# Patient Record
Sex: FEMALE | Race: WHITE | NOT HISPANIC OR LATINO | Employment: OTHER | URBAN - METROPOLITAN AREA
[De-identification: names, ages, dates, MRNs, and addresses within clinical notes are randomized per-mention and may not be internally consistent; named-entity substitution may affect disease eponyms.]

---

## 2017-06-08 ENCOUNTER — ANESTHESIA (OUTPATIENT)
Dept: SURGERY | Facility: CLINIC | Age: 47
End: 2017-06-08
Payer: COMMERCIAL

## 2017-06-08 ENCOUNTER — ANESTHESIA EVENT (OUTPATIENT)
Dept: SURGERY | Facility: CLINIC | Age: 47
End: 2017-06-08
Payer: COMMERCIAL

## 2017-06-08 ENCOUNTER — SURGERY (OUTPATIENT)
Age: 47
End: 2017-06-08

## 2017-06-08 ENCOUNTER — APPOINTMENT (OUTPATIENT)
Dept: ULTRASOUND IMAGING | Facility: CLINIC | Age: 47
End: 2017-06-08
Attending: FAMILY MEDICINE
Payer: COMMERCIAL

## 2017-06-08 ENCOUNTER — HOSPITAL ENCOUNTER (OUTPATIENT)
Facility: CLINIC | Age: 47
Discharge: HOME | End: 2017-06-08
Attending: FAMILY MEDICINE | Admitting: SPECIALIST
Payer: COMMERCIAL

## 2017-06-08 VITALS
HEART RATE: 63 BPM | DIASTOLIC BLOOD PRESSURE: 95 MMHG | SYSTOLIC BLOOD PRESSURE: 144 MMHG | OXYGEN SATURATION: 100 % | RESPIRATION RATE: 14 BRPM | TEMPERATURE: 98.1 F

## 2017-06-08 DIAGNOSIS — Z98.890 POSTOPERATIVE NAUSEA: ICD-10-CM

## 2017-06-08 DIAGNOSIS — R11.0 POSTOPERATIVE NAUSEA: ICD-10-CM

## 2017-06-08 DIAGNOSIS — G89.18 POST-OP PAIN: Primary | ICD-10-CM

## 2017-06-08 DIAGNOSIS — K81.0 ACUTE CHOLECYSTITIS: ICD-10-CM

## 2017-06-08 LAB
ALBUMIN SERPL-MCNC: 3.6 G/DL (ref 3.4–5)
ALP SERPL-CCNC: 104 U/L (ref 40–150)
ALT SERPL W P-5'-P-CCNC: 31 U/L (ref 0–50)
ANION GAP SERPL CALCULATED.3IONS-SCNC: 9 MMOL/L (ref 3–14)
AST SERPL W P-5'-P-CCNC: 23 U/L (ref 0–45)
BASOPHILS # BLD AUTO: 0 10E9/L (ref 0–0.2)
BASOPHILS NFR BLD AUTO: 0.3 %
BILIRUB SERPL-MCNC: 0.3 MG/DL (ref 0.2–1.3)
BUN SERPL-MCNC: 8 MG/DL (ref 7–30)
CALCIUM SERPL-MCNC: 9.8 MG/DL (ref 8.5–10.1)
CHLORIDE SERPL-SCNC: 107 MMOL/L (ref 94–109)
CO2 SERPL-SCNC: 24 MMOL/L (ref 20–32)
CREAT SERPL-MCNC: 0.69 MG/DL (ref 0.52–1.04)
DIFFERENTIAL METHOD BLD: ABNORMAL
EOSINOPHIL # BLD AUTO: 0.5 10E9/L (ref 0–0.7)
EOSINOPHIL NFR BLD AUTO: 4 %
ERYTHROCYTE [DISTWIDTH] IN BLOOD BY AUTOMATED COUNT: 13.6 % (ref 10–15)
GFR SERPL CREATININE-BSD FRML MDRD: ABNORMAL ML/MIN/1.7M2
GLUCOSE SERPL-MCNC: 123 MG/DL (ref 70–99)
HCG UR QL: NEGATIVE
HCT VFR BLD AUTO: 39.7 % (ref 35–47)
HGB BLD-MCNC: 12.9 G/DL (ref 11.7–15.7)
IMM GRANULOCYTES # BLD: 0 10E9/L (ref 0–0.4)
IMM GRANULOCYTES NFR BLD: 0.2 %
LIPASE SERPL-CCNC: 214 U/L (ref 73–393)
LYMPHOCYTES # BLD AUTO: 3.4 10E9/L (ref 0.8–5.3)
LYMPHOCYTES NFR BLD AUTO: 26.4 %
MCH RBC QN AUTO: 28.7 PG (ref 26.5–33)
MCHC RBC AUTO-ENTMCNC: 32.5 G/DL (ref 31.5–36.5)
MCV RBC AUTO: 88 FL (ref 78–100)
MONOCYTES # BLD AUTO: 1.2 10E9/L (ref 0–1.3)
MONOCYTES NFR BLD AUTO: 9.2 %
NEUTROPHILS # BLD AUTO: 7.7 10E9/L (ref 1.6–8.3)
NEUTROPHILS NFR BLD AUTO: 59.9 %
PLATELET # BLD AUTO: 382 10E9/L (ref 150–450)
POTASSIUM SERPL-SCNC: 4.1 MMOL/L (ref 3.4–5.3)
PROT SERPL-MCNC: 7.7 G/DL (ref 6.8–8.8)
RBC # BLD AUTO: 4.5 10E12/L (ref 3.8–5.2)
SODIUM SERPL-SCNC: 140 MMOL/L (ref 133–144)
WBC # BLD AUTO: 12.9 10E9/L (ref 4–11)

## 2017-06-08 PROCEDURE — 47562 LAPAROSCOPIC CHOLECYSTECTOMY: CPT | Mod: 22 | Performed by: SPECIALIST

## 2017-06-08 PROCEDURE — 37000008 ZZH ANESTHESIA TECHNICAL FEE, 1ST 30 MIN: Performed by: SPECIALIST

## 2017-06-08 PROCEDURE — 25000128 H RX IP 250 OP 636: Performed by: FAMILY MEDICINE

## 2017-06-08 PROCEDURE — 96361 HYDRATE IV INFUSION ADD-ON: CPT | Performed by: FAMILY MEDICINE

## 2017-06-08 PROCEDURE — 25000125 ZZHC RX 250: Performed by: NURSE ANESTHETIST, CERTIFIED REGISTERED

## 2017-06-08 PROCEDURE — 71000027 ZZH RECOVERY PHASE 2 EACH 15 MINS: Performed by: SPECIALIST

## 2017-06-08 PROCEDURE — 25000564 ZZH DESFLURANE, EA 15 MIN: Performed by: SPECIALIST

## 2017-06-08 PROCEDURE — 99285 EMERGENCY DEPT VISIT HI MDM: CPT | Mod: 25 | Performed by: FAMILY MEDICINE

## 2017-06-08 PROCEDURE — 76705 ECHO EXAM OF ABDOMEN: CPT

## 2017-06-08 PROCEDURE — 36000058 ZZH SURGERY LEVEL 3 EA 15 ADDTL MIN: Performed by: SPECIALIST

## 2017-06-08 PROCEDURE — 96376 TX/PRO/DX INJ SAME DRUG ADON: CPT | Performed by: FAMILY MEDICINE

## 2017-06-08 PROCEDURE — 88304 TISSUE EXAM BY PATHOLOGIST: CPT | Performed by: SPECIALIST

## 2017-06-08 PROCEDURE — 25000128 H RX IP 250 OP 636: Performed by: NURSE ANESTHETIST, CERTIFIED REGISTERED

## 2017-06-08 PROCEDURE — 25000125 ZZHC RX 250: Performed by: FAMILY MEDICINE

## 2017-06-08 PROCEDURE — 80053 COMPREHEN METABOLIC PANEL: CPT | Performed by: FAMILY MEDICINE

## 2017-06-08 PROCEDURE — 81025 URINE PREGNANCY TEST: CPT | Performed by: NURSE ANESTHETIST, CERTIFIED REGISTERED

## 2017-06-08 PROCEDURE — 83690 ASSAY OF LIPASE: CPT | Performed by: FAMILY MEDICINE

## 2017-06-08 PROCEDURE — 25000125 ZZHC RX 250: Performed by: SPECIALIST

## 2017-06-08 PROCEDURE — 96374 THER/PROPH/DIAG INJ IV PUSH: CPT | Performed by: FAMILY MEDICINE

## 2017-06-08 PROCEDURE — 99222 1ST HOSP IP/OBS MODERATE 55: CPT | Mod: 57 | Performed by: SPECIALIST

## 2017-06-08 PROCEDURE — 85025 COMPLETE CBC W/AUTO DIFF WBC: CPT | Performed by: FAMILY MEDICINE

## 2017-06-08 PROCEDURE — 25000132 ZZH RX MED GY IP 250 OP 250 PS 637: Performed by: SPECIALIST

## 2017-06-08 PROCEDURE — 40000306 ZZH STATISTIC PRE PROC ASSESS II: Performed by: SPECIALIST

## 2017-06-08 PROCEDURE — 37000009 ZZH ANESTHESIA TECHNICAL FEE, EACH ADDTL 15 MIN: Performed by: SPECIALIST

## 2017-06-08 PROCEDURE — 96375 TX/PRO/DX INJ NEW DRUG ADDON: CPT | Performed by: FAMILY MEDICINE

## 2017-06-08 PROCEDURE — 27210794 ZZH OR GENERAL SUPPLY STERILE: Performed by: SPECIALIST

## 2017-06-08 PROCEDURE — 71000014 ZZH RECOVERY PHASE 1 LEVEL 2 FIRST HR: Performed by: SPECIALIST

## 2017-06-08 PROCEDURE — 36000056 ZZH SURGERY LEVEL 3 1ST 30 MIN: Performed by: SPECIALIST

## 2017-06-08 PROCEDURE — 88304 TISSUE EXAM BY PATHOLOGIST: CPT | Mod: 26 | Performed by: SPECIALIST

## 2017-06-08 PROCEDURE — 25000132 ZZH RX MED GY IP 250 OP 250 PS 637: Performed by: FAMILY MEDICINE

## 2017-06-08 PROCEDURE — 71000015 ZZH RECOVERY PHASE 1 LEVEL 2 EA ADDTL HR: Performed by: SPECIALIST

## 2017-06-08 PROCEDURE — 27110028 ZZH OR GENERAL SUPPLY NON-STERILE: Performed by: SPECIALIST

## 2017-06-08 RX ORDER — NEOSTIGMINE METHYLSULFATE 1 MG/ML
VIAL (ML) INJECTION PRN
Status: DISCONTINUED | OUTPATIENT
Start: 2017-06-08 | End: 2017-06-08

## 2017-06-08 RX ORDER — OXYCODONE AND ACETAMINOPHEN 5; 325 MG/1; MG/1
1-2 TABLET ORAL EVERY 4 HOURS PRN
Qty: 30 TABLET | Refills: 0 | Status: SHIPPED | OUTPATIENT
Start: 2017-06-08 | End: 2020-10-05

## 2017-06-08 RX ORDER — SODIUM CHLORIDE 9 MG/ML
1000 INJECTION, SOLUTION INTRAVENOUS CONTINUOUS
Status: DISCONTINUED | OUTPATIENT
Start: 2017-06-08 | End: 2017-06-08 | Stop reason: HOSPADM

## 2017-06-08 RX ORDER — SCOLOPAMINE TRANSDERMAL SYSTEM 1 MG/1
1 PATCH, EXTENDED RELEASE TRANSDERMAL ONCE
Status: COMPLETED | OUTPATIENT
Start: 2017-06-08 | End: 2017-06-08

## 2017-06-08 RX ORDER — LORAZEPAM 2 MG/ML
1 INJECTION INTRAMUSCULAR ONCE
Status: COMPLETED | OUTPATIENT
Start: 2017-06-08 | End: 2017-06-08

## 2017-06-08 RX ORDER — ONDANSETRON 4 MG/1
4 TABLET, ORALLY DISINTEGRATING ORAL EVERY 30 MIN PRN
Status: DISCONTINUED | OUTPATIENT
Start: 2017-06-08 | End: 2017-06-08 | Stop reason: HOSPADM

## 2017-06-08 RX ORDER — DIMENHYDRINATE 50 MG/ML
25 INJECTION, SOLUTION INTRAMUSCULAR; INTRAVENOUS
Status: COMPLETED | OUTPATIENT
Start: 2017-06-08 | End: 2017-06-08

## 2017-06-08 RX ORDER — BUPIVACAINE HYDROCHLORIDE AND EPINEPHRINE 2.5; 5 MG/ML; UG/ML
INJECTION, SOLUTION INFILTRATION; PERINEURAL PRN
Status: DISCONTINUED | OUTPATIENT
Start: 2017-06-08 | End: 2017-06-08 | Stop reason: HOSPADM

## 2017-06-08 RX ORDER — ONDANSETRON 2 MG/ML
4 INJECTION INTRAMUSCULAR; INTRAVENOUS EVERY 30 MIN PRN
Status: DISCONTINUED | OUTPATIENT
Start: 2017-06-08 | End: 2017-06-08 | Stop reason: HOSPADM

## 2017-06-08 RX ORDER — SODIUM CHLORIDE, SODIUM LACTATE, POTASSIUM CHLORIDE, CALCIUM CHLORIDE 600; 310; 30; 20 MG/100ML; MG/100ML; MG/100ML; MG/100ML
INJECTION, SOLUTION INTRAVENOUS CONTINUOUS
Status: DISCONTINUED | OUTPATIENT
Start: 2017-06-08 | End: 2017-06-08 | Stop reason: HOSPADM

## 2017-06-08 RX ORDER — NALOXONE HYDROCHLORIDE 0.4 MG/ML
.1-.4 INJECTION, SOLUTION INTRAMUSCULAR; INTRAVENOUS; SUBCUTANEOUS
Status: DISCONTINUED | OUTPATIENT
Start: 2017-06-08 | End: 2017-06-08 | Stop reason: HOSPADM

## 2017-06-08 RX ORDER — FENTANYL CITRATE 50 UG/ML
50-100 INJECTION, SOLUTION INTRAMUSCULAR; INTRAVENOUS
Status: DISCONTINUED | OUTPATIENT
Start: 2017-06-08 | End: 2017-06-08 | Stop reason: HOSPADM

## 2017-06-08 RX ORDER — OXYCODONE AND ACETAMINOPHEN 5; 325 MG/1; MG/1
1-2 TABLET ORAL
Status: COMPLETED | OUTPATIENT
Start: 2017-06-08 | End: 2017-06-08

## 2017-06-08 RX ORDER — DEXTROSE, SODIUM CHLORIDE, SODIUM LACTATE, POTASSIUM CHLORIDE, AND CALCIUM CHLORIDE 5; .6; .31; .03; .02 G/100ML; G/100ML; G/100ML; G/100ML; G/100ML
INJECTION, SOLUTION INTRAVENOUS CONTINUOUS
Status: DISCONTINUED | OUTPATIENT
Start: 2017-06-08 | End: 2017-06-08 | Stop reason: HOSPADM

## 2017-06-08 RX ORDER — LIDOCAINE HYDROCHLORIDE 20 MG/ML
INJECTION, SOLUTION INFILTRATION; PERINEURAL PRN
Status: DISCONTINUED | OUTPATIENT
Start: 2017-06-08 | End: 2017-06-08

## 2017-06-08 RX ORDER — LIDOCAINE 40 MG/G
CREAM TOPICAL
Status: DISCONTINUED | OUTPATIENT
Start: 2017-06-08 | End: 2017-06-08 | Stop reason: HOSPADM

## 2017-06-08 RX ORDER — PROPOFOL 10 MG/ML
INJECTION, EMULSION INTRAVENOUS PRN
Status: DISCONTINUED | OUTPATIENT
Start: 2017-06-08 | End: 2017-06-08

## 2017-06-08 RX ORDER — FENTANYL CITRATE 50 UG/ML
25-50 INJECTION, SOLUTION INTRAMUSCULAR; INTRAVENOUS
Status: DISCONTINUED | OUTPATIENT
Start: 2017-06-08 | End: 2017-06-08 | Stop reason: HOSPADM

## 2017-06-08 RX ORDER — EPHEDRINE SULFATE 50 MG/ML
INJECTION, SOLUTION INTRAMUSCULAR; INTRAVENOUS; SUBCUTANEOUS PRN
Status: DISCONTINUED | OUTPATIENT
Start: 2017-06-08 | End: 2017-06-08

## 2017-06-08 RX ORDER — GLYCOPYRROLATE 0.2 MG/ML
INJECTION, SOLUTION INTRAMUSCULAR; INTRAVENOUS PRN
Status: DISCONTINUED | OUTPATIENT
Start: 2017-06-08 | End: 2017-06-08

## 2017-06-08 RX ORDER — ONDANSETRON 4 MG/1
4 TABLET, FILM COATED ORAL EVERY 6 HOURS PRN
Qty: 20 TABLET | Refills: 1 | Status: SHIPPED | OUTPATIENT
Start: 2017-06-08 | End: 2020-10-05

## 2017-06-08 RX ORDER — KETOROLAC TROMETHAMINE 30 MG/ML
30 INJECTION, SOLUTION INTRAMUSCULAR; INTRAVENOUS ONCE
Status: DISCONTINUED | OUTPATIENT
Start: 2017-06-08 | End: 2017-06-08 | Stop reason: ALTCHOICE

## 2017-06-08 RX ORDER — KETOROLAC TROMETHAMINE 30 MG/ML
INJECTION, SOLUTION INTRAMUSCULAR; INTRAVENOUS PRN
Status: DISCONTINUED | OUTPATIENT
Start: 2017-06-08 | End: 2017-06-08

## 2017-06-08 RX ORDER — DEXAMETHASONE SODIUM PHOSPHATE 10 MG/ML
INJECTION, SOLUTION INTRAMUSCULAR; INTRAVENOUS PRN
Status: DISCONTINUED | OUTPATIENT
Start: 2017-06-08 | End: 2017-06-08

## 2017-06-08 RX ORDER — CALCIUM CARBONATE 500 MG/1
1 TABLET, CHEWABLE ORAL DAILY
COMMUNITY

## 2017-06-08 RX ORDER — MEPERIDINE HYDROCHLORIDE 50 MG/ML
12.5 INJECTION INTRAMUSCULAR; INTRAVENOUS; SUBCUTANEOUS
Status: DISCONTINUED | OUTPATIENT
Start: 2017-06-08 | End: 2017-06-08 | Stop reason: HOSPADM

## 2017-06-08 RX ORDER — ERTAPENEM 1 G/1
1 INJECTION, POWDER, LYOPHILIZED, FOR SOLUTION INTRAMUSCULAR; INTRAVENOUS ONCE
Status: COMPLETED | OUTPATIENT
Start: 2017-06-08 | End: 2017-06-08

## 2017-06-08 RX ORDER — IBUPROFEN 600 MG/1
600 TABLET, FILM COATED ORAL EVERY 6 HOURS PRN
COMMUNITY

## 2017-06-08 RX ADMIN — FENTANYL CITRATE 50 MCG: 50 INJECTION INTRAMUSCULAR; INTRAVENOUS at 13:06

## 2017-06-08 RX ADMIN — FENTANYL CITRATE 50 MCG: 50 INJECTION INTRAMUSCULAR; INTRAVENOUS at 08:11

## 2017-06-08 RX ADMIN — DEXAMETHASONE SODIUM PHOSPHATE 10 MG: 10 INJECTION, SOLUTION INTRAMUSCULAR; INTRAVENOUS at 13:23

## 2017-06-08 RX ADMIN — SODIUM CHLORIDE, POTASSIUM CHLORIDE, SODIUM LACTATE AND CALCIUM CHLORIDE: 600; 310; 30; 20 INJECTION, SOLUTION INTRAVENOUS at 13:52

## 2017-06-08 RX ADMIN — MIDAZOLAM HYDROCHLORIDE 1 MG: 1 INJECTION, SOLUTION INTRAMUSCULAR; INTRAVENOUS at 13:03

## 2017-06-08 RX ADMIN — DIMENHYDRINATE 25 MG: 50 INJECTION, SOLUTION INTRAMUSCULAR; INTRAVENOUS at 14:49

## 2017-06-08 RX ADMIN — NEOSTIGMINE METHYLSULFATE 3 MG: 1 INJECTION INTRAMUSCULAR; INTRAVENOUS; SUBCUTANEOUS at 14:04

## 2017-06-08 RX ADMIN — ROCURONIUM BROMIDE 40 MG: 10 INJECTION INTRAVENOUS at 13:05

## 2017-06-08 RX ADMIN — SODIUM CHLORIDE 1000 ML: 9 INJECTION, SOLUTION INTRAVENOUS at 05:05

## 2017-06-08 RX ADMIN — KETOROLAC TROMETHAMINE 30 MG: 30 INJECTION, SOLUTION INTRAMUSCULAR at 14:03

## 2017-06-08 RX ADMIN — LIDOCAINE HYDROCHLORIDE 30 ML: 20 SOLUTION ORAL; TOPICAL at 05:05

## 2017-06-08 RX ADMIN — ONDANSETRON 4 MG: 2 INJECTION INTRAMUSCULAR; INTRAVENOUS at 13:22

## 2017-06-08 RX ADMIN — FENTANYL CITRATE 50 MCG: 50 INJECTION INTRAMUSCULAR; INTRAVENOUS at 14:17

## 2017-06-08 RX ADMIN — PROCHLORPERAZINE EDISYLATE 5 MG: 5 INJECTION INTRAMUSCULAR; INTRAVENOUS at 15:08

## 2017-06-08 RX ADMIN — LORAZEPAM 1 MG: 2 INJECTION INTRAMUSCULAR; INTRAVENOUS at 05:11

## 2017-06-08 RX ADMIN — SODIUM CHLORIDE, POTASSIUM CHLORIDE, SODIUM LACTATE AND CALCIUM CHLORIDE: 600; 310; 30; 20 INJECTION, SOLUTION INTRAVENOUS at 13:01

## 2017-06-08 RX ADMIN — MIDAZOLAM HYDROCHLORIDE 1 MG: 1 INJECTION, SOLUTION INTRAMUSCULAR; INTRAVENOUS at 13:04

## 2017-06-08 RX ADMIN — Medication 30 ML: at 14:01

## 2017-06-08 RX ADMIN — ONDANSETRON HYDROCHLORIDE 4 MG: 2 INJECTION, SOLUTION INTRAMUSCULAR; INTRAVENOUS at 14:40

## 2017-06-08 RX ADMIN — LIDOCAINE HYDROCHLORIDE 80 MG: 20 INJECTION, SOLUTION INFILTRATION; PERINEURAL at 13:05

## 2017-06-08 RX ADMIN — FENTANYL CITRATE 100 MCG: 50 INJECTION INTRAMUSCULAR; INTRAVENOUS at 05:07

## 2017-06-08 RX ADMIN — SODIUM CHLORIDE 1000 ML: 9 INJECTION, SOLUTION INTRAVENOUS at 06:08

## 2017-06-08 RX ADMIN — FENTANYL CITRATE 50 MCG: 50 INJECTION INTRAMUSCULAR; INTRAVENOUS at 10:53

## 2017-06-08 RX ADMIN — SODIUM CHLORIDE, SODIUM LACTATE, POTASSIUM CHLORIDE, CALCIUM CHLORIDE AND DEXTROSE MONOHYDRATE: 5; 600; 310; 30; 20 INJECTION, SOLUTION INTRAVENOUS at 15:46

## 2017-06-08 RX ADMIN — SCOPOLAMINE 1 PATCH: 1 PATCH, EXTENDED RELEASE TRANSDERMAL at 15:41

## 2017-06-08 RX ADMIN — FENTANYL CITRATE 50 MCG: 50 INJECTION INTRAMUSCULAR; INTRAVENOUS at 13:05

## 2017-06-08 RX ADMIN — GLYCOPYRROLATE 0.6 MG: 0.2 INJECTION, SOLUTION INTRAMUSCULAR; INTRAVENOUS at 14:04

## 2017-06-08 RX ADMIN — FENTANYL CITRATE 50 MCG: 50 INJECTION INTRAMUSCULAR; INTRAVENOUS at 14:19

## 2017-06-08 RX ADMIN — FENTANYL CITRATE 50 MCG: 50 INJECTION INTRAMUSCULAR; INTRAVENOUS at 13:03

## 2017-06-08 RX ADMIN — Medication 10 MG: at 13:28

## 2017-06-08 RX ADMIN — OXYCODONE HYDROCHLORIDE AND ACETAMINOPHEN 1 TABLET: 5; 325 TABLET ORAL at 16:50

## 2017-06-08 RX ADMIN — PROPOFOL 150 MG: 10 INJECTION, EMULSION INTRAVENOUS at 13:05

## 2017-06-08 RX ADMIN — ERTAPENEM SODIUM 1 G: 1 INJECTION, POWDER, LYOPHILIZED, FOR SOLUTION INTRAMUSCULAR; INTRAVENOUS at 07:48

## 2017-06-08 NOTE — ANESTHESIA CARE TRANSFER NOTE
Patient: Janiya Samuels    Procedure(s):  Laparoscopic Cholecystectomy - Wound Class: II-Clean Contaminated    Diagnosis: cholecystitis  Diagnosis Additional Information: No value filed.    Anesthesia Type:   General, ETT     Note:  Airway :Face Mask  Patient transferred to:PACU  Comments: Patient resting without complaint at this time. Report to pacu rn      Vitals: (Last set prior to Anesthesia Care Transfer)    CRNA VITALS  6/8/2017 1349 - 6/8/2017 1424      6/8/2017             Pulse: 113    SpO2: 100 %    Resp Rate (observed): 20                Electronically Signed By: MALA Moulton CRNA  June 8, 2017  2:24 PM

## 2017-06-08 NOTE — ANESTHESIA POSTPROCEDURE EVALUATION
Patient: Janiya Samuels    Procedure(s):  Laparoscopic Cholecystectomy - Wound Class: II-Clean Contaminated    Diagnosis:cholecystitis  Diagnosis Additional Information: No value filed.    Anesthesia Type:  General, ETT    Note:  Anesthesia Post Evaluation    Patient location during evaluation: Phase 2  Patient participation: Able to fully participate in evaluation  Level of consciousness: awake and alert  Pain management: adequate  Airway patency: patent  Cardiovascular status: acceptable  Respiratory status: acceptable  Hydration status: acceptable  PONV: controlled             Last vitals:  Vitals:    06/08/17 1615 06/08/17 1630 06/08/17 1645   BP: 135/88 127/77 (!) 146/96   Pulse:      Resp: 14     Temp:      SpO2: 97% 100% 100%         Electronically Signed By: MALA Morales CRNA  June 8, 2017  5:06 PM

## 2017-06-08 NOTE — ANESTHESIA PREPROCEDURE EVALUATION
Anesthesia Evaluation     . Pt has had prior anesthetic. Type: Regional           ROS/MED HX    ENT/Pulmonary:       Neurologic:  - neg neurologic ROS     Cardiovascular:     (+) ----. : . . . :. . No previous cardiac testing       METS/Exercise Tolerance:     Hematologic:  - neg hematologic  ROS       Musculoskeletal:  - neg musculoskeletal ROS       GI/Hepatic:     (+) Other GI/Hepatic ABD pain      Renal/Genitourinary:  - ROS Renal section negative   (+) Pt has no history of transplant,       Endo:  - neg endo ROS       Psychiatric:  - neg psychiatric ROS       Infectious Disease:  - neg infectious disease ROS       Malignancy:      - no malignancy   Other:    (+) No chance of pregnancy C-spine cleared: N/A, no H/O Chronic Pain,  - neg other ROS                 Physical Exam  Normal systems: cardiovascular, pulmonary and dental    Airway   Mallampati: II  TM distance: >3 FB  Neck ROM: full    Dental     Cardiovascular   Rhythm and rate: regular and normal      Pulmonary    breath sounds clear to auscultation                    Anesthesia Plan      History & Physical Review  History and physical reviewed and following examination; no interval change.    ASA Status:  1 .    NPO Status:  > 8 hours    Plan for General and ETT with Intravenous and Propofol induction.   PONV prophylaxis:  Ondansetron (or other 5HT-3) and Dexamethasone or Solumedrol       Postoperative Care  Postoperative pain management:  IV analgesics and Oral pain medications.      Consents  Anesthetic plan, risks, benefits and alternatives discussed with:  Patient..                          .

## 2017-06-08 NOTE — ED PROVIDER NOTES
History     Chief Complaint   Patient presents with     Abdominal Pain     HPI  Janiya Samuels is a 46 year old female who presents to the ED this morning with epigastric pain that started gradually on the evening of 2017 after eating a hamburger for supper.  She did not get much rest that night.  Took Tums several times with little relief.  She had pain throughout the day yesterday and tonight the pain worsened so that she has been unable to sleep.      Looking back, she has had intermittent episodes of discomfort for about the past year or so.  She thought it was related to gastritis or an ulcer, as her son had similar symptoms with an ulcer, so she cut out caffeine and tried to change her diet.  She would find some relief with Tums and the episodes decreased in severity and frequency.  They have never lasted this long or been this severe.  She denies any fever.    No nausea vomiting.  No diarrhea or constipation.  No dysuria.  Last menstrual period was 3 weeks ago.  She denies chance of pregnancy as her  had a vasectomy 14 years ago.  She hasn't had much to eat in the past day or so because it seems to worsen if she eats.    Only surgery is a .    History reviewed. No pertinent past medical history.    Past Surgical History:   Procedure Laterality Date      SECTION      5th child       Social History     Social History     Marital status:      Spouse name: N/A     Number of children: N/A     Years of education: N/A     Occupational History     Not on file.     Social History Main Topics     Smoking status: Never Smoker     Smokeless tobacco: Never Used     Alcohol use Not on file     Drug use: Not on file     Sexual activity: Yes     Partners: Male     Birth control/ protection: Surgical      Comment:  vast     Other Topics Concern     Not on file     Social History Narrative          Allergies   Allergen Reactions     Erythromycin        Med List Reviewed       I  have reviewed the Medications, Allergies, Past Medical and Surgical History, and Social History in the Epic system.    Review of Systems   All other systems reviewed and are negative.      Physical Exam   BP: (!) 168/95  Heart Rate: 64  Temp: 97.5  F (36.4  C)  Resp: 18  SpO2: 99 %  Physical Exam   Constitutional: She is oriented to person, place, and time. She appears well-developed and well-nourished. She appears distressed (moderate, tearful at times).   HENT:   Mouth/Throat: Oropharynx is clear and moist.   Eyes: EOM are normal.   Neck: Neck supple.   Cardiovascular: Normal rate, regular rhythm and normal heart sounds.    Pulmonary/Chest: Effort normal and breath sounds normal. No respiratory distress.   Abdominal: Soft. There is tenderness (marked in RUQ and epigastric).   Musculoskeletal: Normal range of motion. She exhibits no edema.   Neurological: She is alert and oriented to person, place, and time.   Skin: Skin is warm and dry. No erythema.   Psychiatric: She has a normal mood and affect.       ED Course    She just took ibuprofen 600 mg an hour ago so we will hold off on Toradol.  IV placed.  Labs drawn .  Suspicious for gallbladder etiology.  Fentanyl for pain.  Ativan for spasm and to help augment the pain control.    White count up at 12.9.  LFTs and lipase are normal.    RUQ ultrasound has been ordered looking for evidence of cholecystitis.  Atypical appendicitis presentation must also be kept in mind.     She is much more comfortable and finally able to get some rest after the fentanyl and Ativan.  She hasn't had much sleep the last 2 nights.  Ultrasound is still pending.    Ultrasound shows the gallbladder wall to be thickened measuring up to 0.4 cm.  There is sludge within the gallbladder and the gallbladder is distended measuring almost 15 cm.  No shadowing gallstones identified.  No pericholecystic fluid.      This is suspicious for cholecystitis and with her elevated white count, I contacted  Dr. Cisneros from general surgery.  He concurs and will plan on taking her gallbladder out later today.  In the meantime, her pain is well controlled with fentanyl and Ativan.  He requested we give her a gram of Invanz while we're waiting.  He will contact the surgery department and get back to us.    Surgery is tentatively scheduled for 1300 today.  No personal or family history of bleeding problems or problems with anesthesia other than she did drop her BP with a labor epidural.      She is cleared for surgery and anesthesia of choice.         ED Course     Procedures          Results for orders placed or performed during the hospital encounter of 06/08/17 (from the past 24 hour(s))   CBC with platelets differential   Result Value Ref Range    WBC 12.9 (H) 4.0 - 11.0 10e9/L    RBC Count 4.50 3.8 - 5.2 10e12/L    Hemoglobin 12.9 11.7 - 15.7 g/dL    Hematocrit 39.7 35.0 - 47.0 %    MCV 88 78 - 100 fl    MCH 28.7 26.5 - 33.0 pg    MCHC 32.5 31.5 - 36.5 g/dL    RDW 13.6 10.0 - 15.0 %    Platelet Count 382 150 - 450 10e9/L    Diff Method Automated Method     % Neutrophils 59.9 %    % Lymphocytes 26.4 %    % Monocytes 9.2 %    % Eosinophils 4.0 %    % Basophils 0.3 %    % Immature Granulocytes 0.2 %    Absolute Neutrophil 7.7 1.6 - 8.3 10e9/L    Absolute Lymphocytes 3.4 0.8 - 5.3 10e9/L    Absolute Monocytes 1.2 0.0 - 1.3 10e9/L    Absolute Eosinophils 0.5 0.0 - 0.7 10e9/L    Absolute Basophils 0.0 0.0 - 0.2 10e9/L    Abs Immature Granulocytes 0.0 0 - 0.4 10e9/L   Comprehensive metabolic panel   Result Value Ref Range    Sodium 140 133 - 144 mmol/L    Potassium 4.1 3.4 - 5.3 mmol/L    Chloride 107 94 - 109 mmol/L    Carbon Dioxide 24 20 - 32 mmol/L    Anion Gap 9 3 - 14 mmol/L    Glucose 123 (H) 70 - 99 mg/dL    Urea Nitrogen 8 7 - 30 mg/dL    Creatinine 0.69 0.52 - 1.04 mg/dL    GFR Estimate >90  Non  GFR Calc   >60 mL/min/1.7m2    GFR Estimate If Black >90   GFR Calc   >60 mL/min/1.7m2     Calcium 9.8 8.5 - 10.1 mg/dL    Bilirubin Total 0.3 0.2 - 1.3 mg/dL    Albumin 3.6 3.4 - 5.0 g/dL    Protein Total 7.7 6.8 - 8.8 g/dL    Alkaline Phosphatase 104 40 - 150 U/L    ALT 31 0 - 50 U/L    AST 23 0 - 45 U/L   Lipase   Result Value Ref Range    Lipase 214 73 - 393 U/L   Abdomen US, limited (RUQ only)    Narrative    ULTRASOUND ABDOMEN LIMITED   6/8/2017 6:55 AM     HISTORY: Right upper quadrant and epigastric pain.    COMPARISON: None.    FINDINGS: The liver is unremarkable. No evidence for fatty  infiltration. No focal hepatic lesions. The gallbladder wall is  thickened, measuring up to 0.4 cm. There is likely sludge within the  gallbladder. No shadowing gallstones are identified. No  pericholecystic fluid. No intra- or extrahepatic bile duct dilatation.  Common hepatic duct is normal in diameter. Limited evaluation of the  right kidney is unremarkable. Pancreas is partially obscured by  overlying bowel gas, but appears normal where seen. The abdominal  aorta and IVC are of normal caliber where visualized.      Impression    IMPRESSION:   1. Sludge within the gallbladder and gallbladder wall thickening.  Acute cholecystitis is not entirely excluded, and clinical correlation  is requested.  2. Otherwise unremarkable right upper quadrant ultrasound.             Assessments & Plan (with Medical Decision Making)    (K81.0) Acute cholecystitis  Comment:   Plan: Dr Cisneros plans on performing a laparoscopic cholecystectomy later today.  Tentatively scheduled for 1300.  Invanz 1 g IV given.  Her pain is well controlled with fentanyl and Ativan.   She is cleared for surgery and anesthesia of choice.       I have reviewed the nursing notes.    I have reviewed the findings, diagnosis, plan and need for follow up with the patient.    New Prescriptions    No medications on file       Final diagnoses:   Acute cholecystitis       6/8/2017   [facility] EMERGENCY DEPARTMENT     Nasim Mcneil,  MD  06/08/17 0737

## 2017-06-08 NOTE — BRIEF OP NOTE
[facility] Brief Operative Note    Pre-operative diagnosis: Acute cholecystitis   Post-operative diagnosis Severe acute cholecystitis with large 2cm stone   Procedure: Procedure(s):  Laparoscopic Cholecystectomy - Wound Class: II-Clean Contaminated - 22 modifier   Surgeon: Cordell Cisneros MD, FACS   Assistants(s): Sameer De La Cruz Surg Tech   Estimated blood loss: Less than 15 ml    Specimens: Gallbladder and stone   Findings: Distended, thickened, inflamed edematous gallbladder with 2cm stone     Cordell Cisneros MD, FACS    #[id number]

## 2017-06-08 NOTE — IP AVS SNAPSHOT
[facility] Phase II    [address]     [place] [address]    Phone:  [phone]                                       After Visit Summary   6/8/2017    Janiya Samuels    MRN: [id number]           After Visit Summary Signature Page     I have received my discharge instructions, and my questions have been answered. I have discussed any challenges I see with this plan with the nurse or doctor.    ..........................................................................................................................................  Patient/Patient Representative Signature      ..........................................................................................................................................  Patient Representative Print Name and Relationship to Patient    ..................................................               ................................................  Date                                            Time    ..........................................................................................................................................  Reviewed by Signature/Title    ...................................................              ..............................................  Date                                                            Time

## 2017-06-08 NOTE — OP NOTE
DATE OF PROCEDURE:  06/08/2017      PREOPERATIVE DIAGNOSIS:  Acute cholecystitis.      POSTOPERATIVE DIAGNOSIS:  Severe acute cholecystitis with large 2 cm stone.      PROCEDURE PERFORMED:  Laparoscopic cholecystectomy with 22 modifier.      SURGEON:  Cordell Cisneros MD       ASSISTANT:  Sameer De La Cruz, Surgical Technician      ANESTHESIA:  General by endotracheal tube.      INDICATIONS FOR PROCEDURE:  Janiya Samuels is a 46-year-old lady who Tuesday evening developed severe right upper quadrant pain that progressively worsened over the next 36 hours to the point she presented to the ER this morning.  An ultrasound in the ER just revealed a large distended gallbladder, but no stone was noted, but she had the signs of acute cholecystitis and for this reason elected to take her to the operating room for a laparoscopic cholecystectomy.      OPERATIVE FINDINGS:  Include a distended, thickened, inflamed, edematous gallbladder with a 2 cm stone.      DETAILS OF PROCEDURE:  Patient was taken to the operating room and placed on the table in supine position.  After induction of general anesthesia, the abdomen was then prepped and draped in sterile fashion.  A timeout was performed confirming the identity of the patient, as well as the procedure to be performed.  An incision was then made just above the umbilicus.  A Veress needle was inserted in the abdomen.  The abdomen was insufflated to 15 mmHg pressure.  An 11 mm supraumbilical trocar was then placed and generalized inspection of the abdomen was then carried out.  A large, thickened, hyperemic, distended gallbladder was readily seen in the right upper quadrant.  Two right upper quadrant 5 mm trocars and an 11 mm subxiphoid trocar were placed.  There was an attempt made to grasp the gallbladder, but due to the severe inflammation, it could not be done safely without perforating the gallbladder.  A decompression needle was then passed in the gallbladder and drained  approximately 15 mL of bile.  We were able to grasp the gallbladder and pull it into position.  We attempted to grasp the base; however, it was difficult due to a stone which was not evident on ultrasound.  Eventually, we were able to grasp it.  We then began to dissect medially and laterally and there was extensive edema and inflammation which slowed the dissection process.  Eventually, the cystic artery was isolated, identified and then we dissected the lateral aspect out, freed the peritoneum off it and a window was then created under the base of the gallbladder, clearly identifying the cystic duct and cystic artery.  The cystic artery was then clipped and transected.  The duct was further skeletonized and the cystic duct itself was then clipped and transected.  The gallbladder was then taken off the liver bed using cautery and removed from the abdomen via an EndoCatch bag.  Inspection of the gallbladder upon removal revealed a large 2 cm stone.  The area was then copiously irrigated.  All fluid was suctioned out.  The liver bed was inspected without evidence of any bleeding.  Then subxiphoid trocar was removed, then closed using an 0 PDS on a fascial closure device.  The remaining 2 right upper quadrant 5 mm trocars were removed without evidence of any bleeding.  The supraumbilical fascia was then closed using a single-stitch 0 PDS.  On all incisions, the subcutaneous tissue was reapproximated using 3-0 Vicryl.  The skin was closed using running 4-0 Monocryl subcuticular stitches.  Steri-Strips, sterile dressings were applied and the patient was then taken from the operating room to recovery room in stable condition to be sent home.         CORDELL WATSON MD             D: 2017 14:11   T: 2017 15:28   MT: JHOAN      Name:     DORIE HODGES   MRN:      [id number]        Account:        [id number]   :      1970           Procedure Date: 2017      Document: [id number]       cc: Cordell  Amelia WICK

## 2017-06-08 NOTE — OR NURSING
Spoke with Marti Hassan CRNA.  Ordered Scopolamine patch and D5RL @ 100ml/hr.  Advised to rest, let pt sleep in Phase 2, give ice chips and saltines if tolerated.

## 2017-06-08 NOTE — OR NURSING
Spoke with Christian Ziegler CRNA about pt's continuous nausea unrelieved with Zofran and Dramamine. Advised to give Compazine, and if ineffective, call anesthesia to determine next action. States could be air from laparoscopy causing this.

## 2017-06-08 NOTE — IP AVS SNAPSHOT
MRN:[id number]                      After Visit Summary   6/8/2017    Janiya Samuels    MRN: [id number]           Thank you!     Thank you for choosing [facility] for your care. Our goal is always to provide you with excellent care. Hearing back from our patients is one way we can continue to improve our services. Please take a few minutes to complete the written survey that you may receive in the mail after you visit with us. Thank you!        Patient Information     Date Of Birth          1970        About your hospital stay     You were admitted on:  N/A You last received care in the:  [facility] Phase II    You were discharged on:  June 8, 2017       Who to Call     For medical emergencies, please call 911.  For non-urgent questions about your medical care, please call your primary care provider or clinic, None  For questions related to your surgery, please call your surgery clinic        Attending Provider     Provider Specialty    Nasim Mcneil MD Emergency Medicine       Primary Care Provider    Physician No Ref-Primary      After Care Instructions     Diet Instructions       Resume pre-procedure diet            Discharge Instructions       Patient to follow up with appointment in 7-10 days.            Do not - immerse incision in water until sutures removed       Do not immerse incision in water until sutures removed            Dressing       Keep dressing clean and dry.  Dressing / incisional care as instructed by RN and or Surgeon            Ice to affected area       Ice to operative site PRN            No Alcohol       For 24 hours post procedure            No driving or operating machinery        until the day after procedure            No lifting        No lifting over 20 lbs and no strenuous physical activity for 2 weeks            Shower       No shower for 24 hours post procedure. May shower Postoperative Day (POD)  2                  Further instructions from  your care team             [facility]    Home Care Following Gallbladder Surgery    Dr. Cisneros      Care of the Incision:    Remove gauze dressing (if present) after 48 hours.    Leave small strips in place (if present).  They will gradually come off.    If surgical glue was used on your incision, keep it dry for 24 hours.  Then you may shower but don t submerge under water for at least 2 weeks.  Gently pat your incision dry with a freshly laundered towel.    Do not touch your incision with bare hands or pick at scabs.    Leave your incision open to air.  Cover it only if draining, clothing rubs or irritates it.    Activity:    Gradually increase your activity.  Walk short distances several times each day and increase the distance as your strength allows.    To promote circulation, do not cross your legs while sitting.    No strenuous lifting or straining for 2 weeks.  Do not lift anything over 20 pounds until your doctor approves an increase.    Return to work will be determined by the type of work you do and should be discussed with your physician.    Do not drive or operate equipment while taking prescription pain medicines.  You may drive 1 week after surgery if you have stopped taking prescription pain medicines and can react quickly enough to make an emergency stop if necessary.    Diet:    Return to the diet you were on before surgery.    Drink plenty of water.    Avoid foods that cause constipation.      REMEMBER--most prescription pain pills cause constipation.  Walking, extra fluids, and increased fiber (fresh fruits and vegetables, etc.) are natural remedies for constipation.  You can also take mineral oil, 1-2 Tablespoons per day.  If still constipated you may try a stool softener such as Colace or Miralax.    Call Your Physician if You Have:    Redness, increased swelling or cloudy drainage from your incision.    A temperature of more than 101 degrees F.    Worsening pain in your incision  "not relieved by your prescription pain pills and/or a short rest.    Jaundice (yellowing of skin or eyes)    Abdominal distention (stomach getting very large)    Swelling in your legs    Productive cough    Burning with urination    Any questions or concerns about your recovery, please call                                   Business hours [phone]    After hours [phone] Nurse Advice Line (24 hours a day)      Follow-up Care:    Make an appointment 2-3 week after your surgery.  Call [phone].        DISCHARGE INSTRUCTIONS FOR PATIENT   SCOPOLAMINE TRANSDERMAL PATCH  You may leave the patch on behind your ear for three days, but NO LONGER. You may have withdrawal symptoms (nausea, vomiting, headache, dizziness) if used longer.  When you remove the patch, you may wash and dry your hands thoroughly and before touching your eyes, as pupil may dilate.  Discard patch (away from children and pets).  You may develop some urinary hesitancy or urine retention.          Pending Results     Date and Time Order Name Status Description    6/8/2017 1352 Surgical pathology exam In process             Admission Information     Date & Time Department Dept. Phone    6/8/2017 [facility] Phase [phone]      Your Vitals Were     Blood Pressure Pulse Temperature Respirations Last Period Pulse Oximetry    146/96 63 98.1  F (36.7  C) (Axillary) 14 05/19/2017 100%      [employer] Information     [employer] lets you send messages to your doctor, view your test results, renew your prescriptions, schedule appointments and more. To sign up, go to [email or web address] ([employer]) . Click on \"Log in\" on the left side of the screen, which will take you to the Welcome page. Then click on \"Sign up Now\" on the right side of the page.     You will be asked to enter the access code listed below, as well as some personal information. Please follow the directions to create your username and password.     Your access code is: " [email or web address]  Expires: 2017  4:04 PM     Your access code will  in 90 days. If you need help or a new code, please call your [place] clinic or [phone].        Care EveryWhere ID     This is your Care EveryWhere ID. This could be used by other organizations to access your [facility] medical records  [id number]           Review of your medicines      START taking        Dose / Directions    ondansetron 4 MG tablet   Commonly known as:  ZOFRAN   Used for:  Postoperative nausea        Dose:  4 mg   Take 1 tablet (4 mg) by mouth every 6 hours as needed for nausea   Quantity:  20 tablet   Refills:  1       oxyCODONE-acetaminophen 5-325 MG per tablet   Commonly known as:  PERCOCET   Used for:  Post-op pain   Notes to Patient:  Take again at 8:45pm OR take one anytime, then start a schedule        Dose:  1-2 tablet   Take 1-2 tablets by mouth every 4 hours as needed for pain (moderate to severe)   Quantity:  30 tablet   Refills:  0         CONTINUE these medicines which have NOT CHANGED        Dose / Directions    calcium carbonate 500 MG chewable tablet   Commonly known as:  TUMS        Dose:  1 chew tab   Take 1 chew tab by mouth daily   Refills:  0       IBUPROFEN PO   Notes to Patient:  May start at 8PM tonight        Dose:  600 mg   Take 600 mg by mouth   Refills:  0            Where to get your medicines      Some of these will need a paper prescription and others can be bought over the counter. Ask your nurse if you have questions.     Bring a paper prescription for each of these medications     ondansetron 4 MG tablet    oxyCODONE-acetaminophen 5-325 MG per tablet                Protect others around you: Learn how to safely use, store and throw away your medicines at www.disposemymeds.org.             Medication List: This is a list of all your medications and when to take them. Check marks below indicate your daily home schedule. Keep this list as a reference.      Medications           Morning  Afternoon Evening Bedtime As Needed    calcium carbonate 500 MG chewable tablet   Commonly known as:  TUMS   Take 1 chew tab by mouth daily                                IBUPROFEN PO   Take 600 mg by mouth   Notes to Patient:  May start at 8PM tonight                                ondansetron 4 MG tablet   Commonly known as:  ZOFRAN   Take 1 tablet (4 mg) by mouth every 6 hours as needed for nausea                                oxyCODONE-acetaminophen 5-325 MG per tablet   Commonly known as:  PERCOCET   Take 1-2 tablets by mouth every 4 hours as needed for pain (moderate to severe)   Last time this was given:  1 tablet on 6/8/2017  4:50 PM   Notes to Patient:  Take again at 8:45pm OR take one anytime, then start a schedule

## 2017-06-08 NOTE — DISCHARGE INSTRUCTIONS
[facility]    Home Care Following Gallbladder Surgery    Dr. Cisneros      Care of the Incision:    Remove gauze dressing (if present) after 48 hours.    Leave small strips in place (if present).  They will gradually come off.    If surgical glue was used on your incision, keep it dry for 24 hours.  Then you may shower but don t submerge under water for at least 2 weeks.  Gently pat your incision dry with a freshly laundered towel.    Do not touch your incision with bare hands or pick at scabs.    Leave your incision open to air.  Cover it only if draining, clothing rubs or irritates it.    Activity:    Gradually increase your activity.  Walk short distances several times each day and increase the distance as your strength allows.    To promote circulation, do not cross your legs while sitting.    No strenuous lifting or straining for 2 weeks.  Do not lift anything over 20 pounds until your doctor approves an increase.    Return to work will be determined by the type of work you do and should be discussed with your physician.    Do not drive or operate equipment while taking prescription pain medicines.  You may drive 1 week after surgery if you have stopped taking prescription pain medicines and can react quickly enough to make an emergency stop if necessary.    Diet:    Return to the diet you were on before surgery.    Drink plenty of water.    Avoid foods that cause constipation.      REMEMBER--most prescription pain pills cause constipation.  Walking, extra fluids, and increased fiber (fresh fruits and vegetables, etc.) are natural remedies for constipation.  You can also take mineral oil, 1-2 Tablespoons per day.  If still constipated you may try a stool softener such as Colace or Miralax.    Call Your Physician if You Have:    Redness, increased swelling or cloudy drainage from your incision.    A temperature of more than 101 degrees F.    Worsening pain in your incision not relieved by your  prescription pain pills and/or a short rest.    Jaundice (yellowing of skin or eyes)    Abdominal distention (stomach getting very large)    Swelling in your legs    Productive cough    Burning with urination    Any questions or concerns about your recovery, please call                                   Business hours [phone]    After hours [phone] Nurse Advice Line (24 hours a day)      Follow-up Care:    Make an appointment 2-3 week after your surgery.  Call [phone].        DISCHARGE INSTRUCTIONS FOR PATIENT   SCOPOLAMINE TRANSDERMAL PATCH  You may leave the patch on behind your ear for three days, but NO LONGER. You may have withdrawal symptoms (nausea, vomiting, headache, dizziness) if used longer.  When you remove the patch, you may wash and dry your hands thoroughly and before touching your eyes, as pupil may dilate.  Discard patch (away from children and pets).  You may develop some urinary hesitancy or urine retention.

## 2017-06-08 NOTE — H&P
[facility] History and Physical    Janiya Samuels MRN# [id number]   Age: 46 year old YOB: 1970     Date of Admission:  2017    Home clinic: [facility]  Primary care provider: No Ref-Primary, Physician          Impression and Plan:   Impression:   Acute Cholecysitis      Plan:   Laparoscopic cholecystectomy - the procedure, risks, benefits and alternatives were discussed and she agrees to proceed.           Chief Complaint:   Abdominal pain, right upper quadrant since Tuesday night    History is obtained from the patient          History of Present Illness:   This 46 year old who after eating hamburger Tuesday night develop severe, sharp RUQ abd pain. She didn't sleep much that night.  The pain worsened over Wednesday into Thursday to the point she came to the ER this morning.  U/S this morning revealed a thickened distended, gallbladder.  Denies any prior episodes, fatty food intolerance, nausea, vomiting, fever or chills.  Currently she is resting on the stretcher c/o RUQ pain.         Past Medical History:   History reviewed. No pertinent past medical history.         Past Surgical History:     Past Surgical History:   Procedure Laterality Date      SECTION  1955 child            Social History:     Social History   Substance Use Topics     Smoking status: Never Smoker     Smokeless tobacco: Never Used     Alcohol use Not on file            Family History:     Family History   Problem Relation Age of Onset     Other Cancer Mother      DIABETES Mother      Hypertension Mother      Hypertension Father             Immunizations:     VACCINE/DOSE   Diptheria   DPT   DTAP   HBIG   Hepatitis A   Hepatitis B   HIB   Influenza   Measles   Meningococcal   MMR   Mumps   Pneumococcal   Polio   Rubella   Small Pox   TDAP   Varicella   Zoster            Allergies:     Allergies   Allergen Reactions     Erythromycin             Medications:     Current Facility-Administered  Medications   Medication     lidocaine 1 % 1 mL     lidocaine (LMX4) kit     sodium chloride (PF) 0.9% PF flush 3 mL     sodium chloride (PF) 0.9% PF flush 3 mL     0.9% sodium chloride infusion     ondansetron (ZOFRAN) injection 4 mg     fentaNYL Citrate (PF) (SUBLIMAZE) injection  mcg     No Pre Procedure Antibiotic Needed     lidocaine 1 % 1 mL     lidocaine (LMX4) kit     sodium chloride (PF) 0.9% PF flush 3 mL     sodium chloride (PF) 0.9% PF flush 3 mL     lactated ringers infusion     Current Outpatient Prescriptions   Medication Sig     IBUPROFEN PO Take 600 mg by mouth     calcium carbonate (TUMS) 500 MG chewable tablet Take 1 chew tab by mouth daily             Review of Systems:   The review of systems was positive for the following findings.  abdomen.  The remainder of the review of systems was unremarkable.          Physical Exam:   PE:  B/P: 131/84, T: 98.2, P: 63, R: 16  General: well developed, well nourished WF who appears her stated age  HEENT: NC/AT, EOMI, (-)icterus, (-)injection  Neck: Supple, No JVD  Chest: CTA  Heart: S1, S2, (-)m/r/g  Abd: Soft,  non distended, RUQ tenderness with guarding.  (+)Duque's sign  Ext; Warm, no edema  Psych: AAOx3  Neuro: No focal deficits          Data:   All laboratory data reviewed  Results for orders placed or performed during the hospital encounter of 06/08/17   Abdomen US, limited (RUQ only)    Narrative    ULTRASOUND ABDOMEN LIMITED   6/8/2017 6:55 AM     HISTORY: Right upper quadrant and epigastric pain.    COMPARISON: None.    FINDINGS: The liver is unremarkable. No evidence for fatty  infiltration. No focal hepatic lesions. The gallbladder wall is  thickened, measuring up to 0.4 cm. There is likely sludge within the  gallbladder. No shadowing gallstones are identified. No  pericholecystic fluid. No intra- or extrahepatic bile duct dilatation.  Common hepatic duct is normal in diameter. Limited evaluation of the  right kidney is unremarkable.  Pancreas is partially obscured by  overlying bowel gas, but appears normal where seen. The abdominal  aorta and IVC are of normal caliber where visualized.      Impression    IMPRESSION:   1. Sludge within the gallbladder and gallbladder wall thickening.  Acute cholecystitis is not entirely excluded, and clinical correlation  is requested.  2. Otherwise unremarkable right upper quadrant ultrasound.    CBC with platelets differential   Result Value Ref Range    WBC 12.9 (H) 4.0 - 11.0 10e9/L    RBC Count 4.50 3.8 - 5.2 10e12/L    Hemoglobin 12.9 11.7 - 15.7 g/dL    Hematocrit 39.7 35.0 - 47.0 %    MCV 88 78 - 100 fl    MCH 28.7 26.5 - 33.0 pg    MCHC 32.5 31.5 - 36.5 g/dL    RDW 13.6 10.0 - 15.0 %    Platelet Count 382 150 - 450 10e9/L    Diff Method Automated Method     % Neutrophils 59.9 %    % Lymphocytes 26.4 %    % Monocytes 9.2 %    % Eosinophils 4.0 %    % Basophils 0.3 %    % Immature Granulocytes 0.2 %    Absolute Neutrophil 7.7 1.6 - 8.3 10e9/L    Absolute Lymphocytes 3.4 0.8 - 5.3 10e9/L    Absolute Monocytes 1.2 0.0 - 1.3 10e9/L    Absolute Eosinophils 0.5 0.0 - 0.7 10e9/L    Absolute Basophils 0.0 0.0 - 0.2 10e9/L    Abs Immature Granulocytes 0.0 0 - 0.4 10e9/L   Comprehensive metabolic panel   Result Value Ref Range    Sodium 140 133 - 144 mmol/L    Potassium 4.1 3.4 - 5.3 mmol/L    Chloride 107 94 - 109 mmol/L    Carbon Dioxide 24 20 - 32 mmol/L    Anion Gap 9 3 - 14 mmol/L    Glucose 123 (H) 70 - 99 mg/dL    Urea Nitrogen 8 7 - 30 mg/dL    Creatinine 0.69 0.52 - 1.04 mg/dL    GFR Estimate >90  Non  GFR Calc   >60 mL/min/1.7m2    GFR Estimate If Black >90   GFR Calc   >60 mL/min/1.7m2    Calcium 9.8 8.5 - 10.1 mg/dL    Bilirubin Total 0.3 0.2 - 1.3 mg/dL    Albumin 3.6 3.4 - 5.0 g/dL    Protein Total 7.7 6.8 - 8.8 g/dL    Alkaline Phosphatase 104 40 - 150 U/L    ALT 31 0 - 50 U/L    AST 23 0 - 45 U/L   Lipase   Result Value Ref Range    Lipase 214 73 - 393 U/L   HCG  qualitative urine   Result Value Ref Range    HCG Qual Urine Negative NEG     All imaging studies reviewed by me.     Cordell Cisneros MD, FACS

## 2017-06-08 NOTE — ED NOTES
Patient had a onset of epigastric abdominal pain during evening of 6/6/2017.  Patient describes pain as fullness - states she cannot wear her bra as it worsens the pain.  Patient states that TUMS provide some relief.  Patient denies SOB, N/V.  Patient denies issues with stomach, states she still has her Gallbladder.

## 2017-06-12 LAB — COPATH REPORT: NORMAL

## 2017-06-16 ENCOUNTER — OFFICE VISIT (OUTPATIENT)
Dept: SURGERY | Facility: CLINIC | Age: 47
End: 2017-06-16
Payer: COMMERCIAL

## 2017-06-16 VITALS — TEMPERATURE: 97.1 F | WEIGHT: 190 LBS | HEIGHT: 68 IN | BODY MASS INDEX: 28.79 KG/M2

## 2017-06-16 DIAGNOSIS — Z90.49 S/P LAPAROSCOPIC CHOLECYSTECTOMY: Primary | ICD-10-CM

## 2017-06-16 PROCEDURE — 99024 POSTOP FOLLOW-UP VISIT: CPT | Performed by: SURGERY

## 2017-06-16 NOTE — MR AVS SNAPSHOT
After Visit Summary   6/16/2017    Janiya Samuels    MRN: [id number]           Patient Information     Date Of Birth          1970        Visit Information        Provider Department      6/16/2017 9:30 AM Christian Herbert MD [facility]        Care Instructions    Janiya is a 46 year old female who is status post laparoscopic cholecystectomy   with no chills and nofever.      Patient's  Pain is  improving.  Appetite is  improving.    Wound(s)  Is/are   clean dry and intact with no evidence of infection.    Questions were answered and discussion of no lifting more than 20 pounds for  3 weeks after surgery.     Discussed post op fatty food problems without a gallbladder such as diarrhea and flatulence if eats too much fatty food at one time.     Path report shows:  FINAL DIAGNOSIS:   Gallbladder, laparoscopic cholecystectomy:   - Focal mild acute cholecystitis, superimposed on chronic cholecystitis.   - Cholelithiasis.   - Attached 1 cm benign lymph node with multiple small lipogranulomas..  No cancer.     Plan: follow up as needed.  Use antibiotic ointment on wound at night.           Follow-ups after your visit        Who to contact     If you have questions or need follow up information about today's clinic visit or your schedule please contact [facility] directly at [phone].  Normal or non-critical lab and imaging results will be communicated to you by MyChart, letter or phone within 4 business days after the clinic has received the results. If you do not hear from us within 7 days, please contact the clinic through MyChart or phone. If you have a critical or abnormal lab result, we will notify you by phone as soon as possible.  Submit refill requests through [employer] or call your pharmacy and they will forward the refill request to us. Please allow 3 business days for your refill to be completed.          Additional Information About Your Visit       "  MyChart Information     [employer] lets you send messages to your doctor, view your test results, renew your prescriptions, schedule appointments and more. To sign up, go to [email or web address] . Click on \"Log in\" on the left side of the screen, which will take you to the Welcome page. Then click on \"Sign up Now\" on the right side of the page.     You will be asked to enter the access code listed below, as well as some personal information. Please follow the directions to create your username and password.     Your access code is: [email or web address]  Expires: 2017  4:04 PM     Your access code will  in 90 days. If you need help or a new code, please call your [place] clinic or [phone].        Care EveryWhere ID     This is your Care EveryWhere ID. This could be used by other organizations to access your [facility] medical records  [id number]        Your Vitals Were     Temperature Height Last Period BMI (Body Mass Index)          97.1  F (36.2  C) (Temporal) 5' 8\" (1.727 m) 2017 28.89 kg/m2         Blood Pressure from Last 3 Encounters:   17 (!) 144/95   05/18/15 108/64    Weight from Last 3 Encounters:   17 190 lb (86.2 kg)   05/18/15 187 lb 11.2 oz (85.1 kg)              Today, you had the following     No orders found for display       Primary Care Provider    Physician No Ref-Primary       No address on file        Thank you!     Thank you for choosing [facility]  for your care. Our goal is always to provide you with excellent care. Hearing back from our patients is one way we can continue to improve our services. Please take a few minutes to complete the written survey that you may receive in the mail after your visit with us. Thank you!             Your Updated Medication List - Protect others around you: Learn how to safely use, store and throw away your medicines at www.disposemymeds.org.          This list is accurate as of: 17  9:43 AM.  Always use your " most recent med list.                   Brand Name Dispense Instructions for use    calcium carbonate 500 MG chewable tablet    TUMS     Take 1 chew tab by mouth daily       IBUPROFEN PO      Take 600 mg by mouth       ondansetron 4 MG tablet    ZOFRAN    20 tablet    Take 1 tablet (4 mg) by mouth every 6 hours as needed for nausea       oxyCODONE-acetaminophen 5-325 MG per tablet    PERCOCET    30 tablet    Take 1-2 tablets by mouth every 4 hours as needed for pain (moderate to severe)

## 2017-06-16 NOTE — PROGRESS NOTES
Janiya is a 46 year old female who is status post laparoscopic cholecystectomy   with no chills and nofever.      Patient's  Pain is  improving.  Appetite is  improving.    Wound(s)  Is/are   clean dry and intact with no evidence of infection.    Questions were answered and discussion of no lifting more than 20 pounds for  3 weeks after surgery.     Discussed post op fatty food problems without a gallbladder such as diarrhea and flatulence if eats too much fatty food at one time.     Path report shows:  FINAL DIAGNOSIS:   Gallbladder, laparoscopic cholecystectomy:   - Focal mild acute cholecystitis, superimposed on chronic cholecystitis.   - Cholelithiasis.   - Attached 1 cm benign lymph node with multiple small lipogranulomas..  No cancer.     Plan: follow up as needed.  Use antibiotic ointment on wound at night.     Time spent with the patient with greater that 50% of the time in discussion was 15 minutes.  Christian Herbert MD

## 2017-06-16 NOTE — NURSING NOTE
"Chief Complaint   Patient presents with     Surgical Followup     lap mary, DOS 06/08/17       Initial Temp 97.1  F (36.2  C) (Temporal)  Ht 5' 8\" (1.727 m)  Wt 190 lb (86.2 kg)  LMP 05/19/2017  BMI 28.89 kg/m2 Estimated body mass index is 28.89 kg/(m^2) as calculated from the following:    Height as of this encounter: 5' 8\" (1.727 m).    Weight as of this encounter: 190 lb (86.2 kg).  Medication Reconciliation: complete   Rodo CORDOVA CMA      "

## 2017-06-16 NOTE — PATIENT INSTRUCTIONS
Janiya is a 46 year old female who is status post laparoscopic cholecystectomy   with no chills and nofever.      Patient's  Pain is  improving.  Appetite is  improving.    Wound(s)  Is/are   clean dry and intact with no evidence of infection.    Questions were answered and discussion of no lifting more than 20 pounds for  3 weeks after surgery.     Discussed post op fatty food problems without a gallbladder such as diarrhea and flatulence if eats too much fatty food at one time.     Path report shows:  FINAL DIAGNOSIS:   Gallbladder, laparoscopic cholecystectomy:   - Focal mild acute cholecystitis, superimposed on chronic cholecystitis.   - Cholelithiasis.   - Attached 1 cm benign lymph node with multiple small lipogranulomas..  No cancer.     Plan: follow up as needed.  Use antibiotic ointment on wound at night.

## 2018-04-21 ENCOUNTER — OFFICE VISIT (OUTPATIENT)
Dept: URGENT CARE | Facility: RETAIL CLINIC | Age: 48
End: 2018-04-21
Payer: COMMERCIAL

## 2018-04-21 VITALS
SYSTOLIC BLOOD PRESSURE: 102 MMHG | TEMPERATURE: 99.3 F | HEART RATE: 96 BPM | OXYGEN SATURATION: 99 % | DIASTOLIC BLOOD PRESSURE: 68 MMHG

## 2018-04-21 DIAGNOSIS — J02.9 ACUTE PHARYNGITIS, UNSPECIFIED ETIOLOGY: Primary | ICD-10-CM

## 2018-04-21 DIAGNOSIS — B34.9 VIRAL SYNDROME: ICD-10-CM

## 2018-04-21 LAB — S PYO AG THROAT QL IA.RAPID: NEGATIVE

## 2018-04-21 PROCEDURE — 87081 CULTURE SCREEN ONLY: CPT | Performed by: FAMILY MEDICINE

## 2018-04-21 PROCEDURE — 99203 OFFICE O/P NEW LOW 30 MIN: CPT | Performed by: FAMILY MEDICINE

## 2018-04-21 PROCEDURE — 87880 STREP A ASSAY W/OPTIC: CPT | Mod: QW | Performed by: FAMILY MEDICINE

## 2018-04-21 NOTE — MR AVS SNAPSHOT
"              After Visit Summary   4/21/2018    Janiya Samuels    MRN: [id number]           Patient Information     Date Of Birth          1970        Visit Information        Provider Department      4/21/2018 12:50 PM Christian Biswas MD [facility]        Today's Diagnoses     Acute pharyngitis, unspecified etiology    -  1    Viral syndrome          Care Instructions      Viral Syndrome (Adult)  A viral illness may cause a number of symptoms. The symptoms depend on the part of the body that the virus affects. If it settles in your nose, throat, and lungs, it may cause cough, sore throat, congestion, and sometimes headache. If it settles in your stomach and intestinal tract, it may cause vomiting and diarrhea. Sometimes it causes vague symptoms like \"aching all over,\" feeling tired, loss of appetite, or fever.  A viral illness usually lasts 1 to 2 weeks, but sometimes it lasts longer. In some cases, a more serious infection can look like a viral syndrome in the first few days of the illness. You may need another exam and additional tests to know the difference. Watch for the warning signs listed below.  Home care  Follow these guidelines for taking care of yourself at home:    If symptoms are severe, rest at home for the first 2 to 3 days.    Stay away from cigarette smoke - both your smoke and the smoke from others.    You may use over-the-counter acetaminophen or ibuprofen for fever, muscle aching, and headache, unless another medicine was prescribed for this. If you have chronic liver or kidney disease or ever had a stomach ulcer or GI bleeding, talk with your doctor before using these medicines. No one who is younger than 18 and ill with a fever should take aspirin. It may cause severe disease or death.    Your appetite may be poor, so a light diet is fine. Avoid dehydration by drinking 8 to 12 8-ounce glasses of fluids each day. This may include water; orange juice; lemonade; apple, " grape, and cranberry juice; clear fruit drinks; electrolyte replacement and sports drinks; and decaffeinated teas and coffee. If you have been diagnosed with a kidney disease, ask your doctor how much and what types of fluids you should drink to prevent dehydration. If you have kidney disease, drinking too much fluid can cause it build up in the your body and be dangerous to your health.    Over-the-counter remedies won't shorten the length of the illness but may be helpful for cough, sore throat; and nasal and sinus congestion. Don't use decongestants if you have high blood pressure.  Follow-up care  Follow up with your healthcare provider if you do not improve over the next week.  Call 911  Call 911 if any of the following occur:    Convulsion    Feeling weak, dizzy, or like you are going to faint    Chest pain, shortness of breath, wheezing, or difficulty breathing  When to seek medical advice  Call your healthcare provider right away if any of these occur:    Cough with lots of colored sputum (mucus) or blood in your sputum    Chest pain, shortness of breath, wheezing, or difficulty breathing    Severe headache; face, neck, or ear pain    Severe, constant pain in the lower right side of your belly (abdominal)    Continued vomiting (can t keep liquids down)    Frequent diarrhea (more than 5 times a day); blood (red or black color) or mucus in diarrhea    Feeling weak, dizzy, or like you are going to faint    Extreme thirst    Fever of 100.4 F (38 C) or higher, or as directed by your healthcare provider  Date Last Reviewed: 9/25/2015 2000-2017 [employer]. [address] [place] [address]. All rights reserved. This information is not intended as a substitute for professional medical care. Always follow your healthcare professional's instructions.                Follow-ups after your visit        Who to contact     You can reach your care team any time of the day by calling  "[phone].  Notification of test results:  If you have an abnormal lab result, we will notify you by phone as soon as possible.         Additional Information About Your Visit        [employer] Information     [employer] lets you send messages to your doctor, view your test results, renew your prescriptions, schedule appointments and more. To sign up, go to [email or web address] . Click on \"Log in\" on the left side of the screen, which will take you to the Welcome page. Then click on \"Sign up Now\" on the right side of the page.     You will be asked to enter the access code listed below, as well as some personal information. Please follow the directions to create your username and password.     Your access code is: [email or web address]  Expires: 2018  1:11 PM     Your access code will  in 90 days. If you need help or a new code, please call your [facility] clinic or [phone].        Care EveryWhere ID     This is your Care EveryWhere ID. This could be used by other organizations to access your [facility] medical records  [id number]        Your Vitals Were     Pulse Temperature Pulse Oximetry             96 99.3  F (37.4  C) (Temporal) 99%          Blood Pressure from Last 3 Encounters:   18 102/68   17 (!) 144/95   05/18/15 108/64    Weight from Last 3 Encounters:   17 190 lb (86.2 kg)   05/18/15 187 lb 11.2 oz (85.1 kg)              We Performed the Following     BETA STREP GROUP A R/O CULTURE     RAPID STREP SCREEN        Primary Care Provider Fax #    Physician No Ref-Primary [phone]       No address on file        Equal Access to Services     MARIELENA MARKS : Lin Bustos, raúl shore, nicholas bourne. [facility] [phone].    ATENCIÓN: Si habla español, tiene a silva disposición servicios gratuitos de asistencia lingüística. Llame al [phone].    We comply with applicable federal civil rights laws and [place] " laws. We do not discriminate on the basis of race, color, national origin, age, disability, sex, sexual orientation, or gender identity.            Thank you!     Thank you for choosing [facility]  for your care. Our goal is always to provide you with excellent care. Hearing back from our patients is one way we can continue to improve our services. Please take a few minutes to complete the written survey that you may receive in the mail after your visit with us. Thank you!             Your Updated Medication List - Protect others around you: Learn how to safely use, store and throw away your medicines at www.disposemymeds.org.          This list is accurate as of 4/21/18  1:11 PM.  Always use your most recent med list.                   Brand Name Dispense Instructions for use Diagnosis    calcium carbonate 500 MG chewable tablet    TUMS     Take 1 chew tab by mouth daily        IBUPROFEN PO      Take 600 mg by mouth        ondansetron 4 MG tablet    ZOFRAN    20 tablet    Take 1 tablet (4 mg) by mouth every 6 hours as needed for nausea    Postoperative nausea       oxyCODONE-acetaminophen 5-325 MG per tablet    PERCOCET    30 tablet    Take 1-2 tablets by mouth every 4 hours as needed for pain (moderate to severe)    Post-op pain

## 2018-04-21 NOTE — NURSING NOTE
"Chief Complaint   Patient presents with     Pharyngitis     s/t x 5 days     Fever     fever x 5 days     Cough     dry cough        Initial /68  Pulse 96  Temp 99.3  F (37.4  C) (Temporal)  SpO2 99% Estimated body mass index is 28.89 kg/(m^2) as calculated from the following:    Height as of 6/16/17: 5' 8\" (1.727 m).    Weight as of 6/16/17: 190 lb (86.2 kg).  Medication Reconciliation: complete     Jessica Sundet      "

## 2018-04-21 NOTE — PROGRESS NOTES
SUBJECTIVE:   Janiya Samuels is a 47 year old female presenting with a chief complaint of fever, chills, cough - non-productive, sore throat, body aches, fatigue, nausea and diarrhea.  Onset of symptoms was 4 day(s) ago.  Course of illness is waxing and waning.    Severity moderate  Current and Associated symptoms:   Treatment measures tried include Tylenol/Ibuprofen, Fluids and Rest.  Predisposing factors include None.    No past medical history on file.  Current Outpatient Prescriptions   Medication Sig Dispense Refill     calcium carbonate (TUMS) 500 MG chewable tablet Take 1 chew tab by mouth daily       IBUPROFEN PO Take 600 mg by mouth       ondansetron (ZOFRAN) 4 MG tablet Take 1 tablet (4 mg) by mouth every 6 hours as needed for nausea (Patient not taking: Reported on 4/21/2018) 20 tablet 1     oxyCODONE-acetaminophen (PERCOCET) 5-325 MG per tablet Take 1-2 tablets by mouth every 4 hours as needed for pain (moderate to severe) (Patient not taking: Reported on 4/21/2018) 30 tablet 0     Social History   Substance Use Topics     Smoking status: Never Smoker     Smokeless tobacco: Never Used     Alcohol use Not on file       ROS:  Review of systems negative except as stated above.    OBJECTIVE:  /68  Pulse 96  Temp 99.3  F (37.4  C) (Temporal)  SpO2 99%  GENERAL APPEARANCE: pale and fatigued  EYES: EOMI,  PERRL, conjunctiva clear  HENT: ear canals and TM's normal.  Nose and mouth without ulcers, erythema or lesions  NECK: supple, nontender, no lymphadenopathy  RESP: cough  CV: regular rates and rhythm, normal S1 S2, no murmur noted  ABDOMEN:  soft, nontender, no HSM or masses and bowel sounds normal  NEURO: Normal strength and tone, sensory exam grossly normal,  normal speech and mentation  SKIN: no suspicious lesions or rashes    ASSESSMENT:  Viral syndrome    PLAN:  Tylenol, Ibuprofen, Fluids and Rest  See orders in Epic

## 2018-04-21 NOTE — PATIENT INSTRUCTIONS

## 2018-04-23 LAB
BACTERIA SPEC CULT: NORMAL
SPECIMEN SOURCE: NORMAL

## 2020-09-29 ENCOUNTER — HOSPITAL ENCOUNTER (EMERGENCY)
Facility: CLINIC | Age: 50
Discharge: HOME | End: 2020-09-29
Attending: EMERGENCY MEDICINE | Admitting: EMERGENCY MEDICINE
Payer: COMMERCIAL

## 2020-09-29 ENCOUNTER — APPOINTMENT (OUTPATIENT)
Dept: ULTRASOUND IMAGING | Facility: CLINIC | Age: 50
End: 2020-09-29
Attending: EMERGENCY MEDICINE
Payer: COMMERCIAL

## 2020-09-29 VITALS
RESPIRATION RATE: 18 BRPM | BODY MASS INDEX: 28.89 KG/M2 | TEMPERATURE: 98.1 F | DIASTOLIC BLOOD PRESSURE: 88 MMHG | OXYGEN SATURATION: 99 % | SYSTOLIC BLOOD PRESSURE: 126 MMHG | WEIGHT: 190 LBS | HEART RATE: 73 BPM

## 2020-09-29 DIAGNOSIS — M79.661 RIGHT CALF PAIN: ICD-10-CM

## 2020-09-29 DIAGNOSIS — S86.111A: ICD-10-CM

## 2020-09-29 PROCEDURE — 99284 EMERGENCY DEPT VISIT MOD MDM: CPT | Mod: 25 | Performed by: EMERGENCY MEDICINE

## 2020-09-29 PROCEDURE — 76882 US LMTD JT/FCL EVL NVASC XTR: CPT | Mod: RT

## 2020-09-29 PROCEDURE — 99284 EMERGENCY DEPT VISIT MOD MDM: CPT | Mod: Z6 | Performed by: EMERGENCY MEDICINE

## 2020-09-29 RX ORDER — OXYCODONE AND ACETAMINOPHEN 5; 325 MG/1; MG/1
1-2 TABLET ORAL EVERY 4 HOURS PRN
Qty: 12 TABLET | Refills: 0 | Status: SHIPPED | OUTPATIENT
Start: 2020-09-29 | End: 2021-10-30

## 2020-09-29 NOTE — ED PROVIDER NOTES
History     Chief Complaint   Patient presents with     Leg Pain     HPI  Janiay Samuels is a 50 year old female who presents with moderate right calf pain since last evening.  Patient was playing volleyball and dove for a ball.  She felt a pop in her calf and subsequently has had calf pain that has worsened.  She is using crutches for ambulation.  No significant swelling.  No obvious bruising or abrasions.  Denies any back, hip, knee, or foot pain.  No previous injury.  Ibuprofen for pain.  No paresthesias distal to the injury.    Allergies:  Allergies   Allergen Reactions     Erythromycin        Problem List:    Patient Active Problem List    Diagnosis Date Noted     S/P laparoscopic cholecystectomy 2017     Priority: Medium        Past Medical History:    No past medical history on file.    Past Surgical History:    Past Surgical History:   Procedure Laterality Date      SECTION      5th child     LAPAROSCOPIC CHOLECYSTECTOMY N/A 2017    Procedure: LAPAROSCOPIC CHOLECYSTECTOMY;  Laparoscopic Cholecystectomy;  Surgeon: Cordell Cisneros MD;  Location: [facility] OR       Family History:    Family History   Problem Relation Age of Onset     Other Cancer Mother      Diabetes Mother      Hypertension Mother      Hypertension Father        Social History:  Marital Status:   [2]  Social History     Tobacco Use     Smoking status: Never Smoker     Smokeless tobacco: Never Used   Substance Use Topics     Alcohol use: Not on file     Drug use: Not on file        Medications:    oxyCODONE-acetaminophen (PERCOCET) 5-325 MG tablet  calcium carbonate (TUMS) 500 MG chewable tablet  IBUPROFEN PO  ondansetron (ZOFRAN) 4 MG tablet  oxyCODONE-acetaminophen (PERCOCET) 5-325 MG per tablet          Review of Systems all other systems are reviewed and are negative.    Physical Exam   BP: (!) 133/94  Pulse: 79  Temp: 98.1  F (36.7  C)  Resp: 16  Weight: 86.2 kg (190 lb)  SpO2: 99 %      Physical Exam on exam  patient has no obvious swelling or deformity of the calf compared to the opposite leg.  No knee pain or effusion.  No ankle pain or effusion.  Foot is unaffected.  She has tenderness in her calf without fluctuance or mass.  Her Achilles seems intact and with manipulation of the calf she does have plantarflexion of her foot.  The Achilles is mildly tender however.  Intact pulses and sensation.    ED Course        Procedures               Critical Care time:  none               Results for orders placed or performed during the hospital encounter of 09/29/20 (from the past 24 hour(s))   US Extremity Non Vascular Right    Narrative    ULTRASOUND  EXTREMITY NON VASCULAR RIGHT 9/29/2020 3:54 PM    HISTORY: Right calf pain with concerns for either plantaris rupture or  partial Achilles rupture.    COMPARISON: None available      Impression    IMPRESSION: No sonographic abnormality to explain patient's symptoms  of calf and ankle pain.       Medications - No data to display  An ultrasound was ordered to assess for intact Achilles or see if there is fluid collection in the calf that would be consistent with a plantaris rupture.  Assessments & Plan (with Medical Decision Making)   Janiya Samuels is a 50 year old female who presents with moderate right calf pain since last evening.  Patient was playing volleyball and dove for a ball.  She felt a pop in her calf and subsequently has had calf pain that has worsened.  She is using crutches for ambulation.  No significant swelling.  No obvious bruising or abrasions.  Denies any back, hip, knee, or foot pain.  No previous injury.  Ibuprofen for pain.  No paresthesias distal to the injury.  On exam patient had tenderness of the mid calf.  No fluctuance.  No significant swelling compared to the left leg.  With palpation of the calf he did have plantarflexion of the foot.  The Achilles seemed intact on palpation.  Intact pulses, sensation, and strength for the lower extremity.  No  effusion of the knee or ankle.  Ultrasound showed no distinct cause for her pain.  I suspect she had a plantaris rupture.  She can use crutches for nonweightbearing or partial weightbearing.  Ice for swelling.  Ibuprofen or Percocet for pain.  If persistent symptoms follow-up with sports medicine next week.  I have reviewed the nursing notes.    I have reviewed the findings, diagnosis, plan and need for follow up with the patient.       New Prescriptions    OXYCODONE-ACETAMINOPHEN (PERCOCET) 5-325 MG TABLET    Take 1-2 tablets by mouth every 4 hours as needed for severe pain       Final diagnoses:   Right calf pain   Traumatic rupture of right plantaris muscle, initial encounter       9/29/2020   [facility] EMERGENCY DEPARTMENT     Paul Wilde MD  09/29/20 1611

## 2020-09-29 NOTE — ED TRIAGE NOTES
She felt something snap in her R calf last night while playing volleyball.  The pain is worsening.

## 2020-09-29 NOTE — ED AVS SNAPSHOT
[facility] Emergency Department  [address] [place] [address]  Phone:  [phone]  Fax:  [phone]                                    Janiya Samuels   MRN: [id number]    Department:  [facility] Emergency Department   Date of Visit:  9/29/2020           After Visit Summary Signature Page    I have received my discharge instructions, and my questions have been answered. I have discussed any challenges I see with this plan with the nurse or doctor.    ..........................................................................................................................................  Patient/Patient Representative Signature      ..........................................................................................................................................  Patient Representative Print Name and Relationship to Patient    ..................................................               ................................................  Date                                   Time    ..........................................................................................................................................  Reviewed by Signature/Title    ...................................................              ..............................................  Date                                               Time          22EPIC Rev 08/18

## 2020-09-29 NOTE — DISCHARGE INSTRUCTIONS
Crutch walk / partial weightbearing.  Ice for swelling.  Ibuprofen or Percocet for pain.  Follow-up in sports medicine clinic next week if persistent symptoms.

## 2020-10-03 NOTE — PROGRESS NOTES
Sports Medicine Clinic Visit    PCP: No Ref-Primary, Physician    CC: Patient presents with:  Musculoskeletal Problem: R calf      HPI:  Janiya Samuels is a 50 year old female who is seen as an ER referral.   She notes right lower leg pain that began 2020 when she was going for a volleyball and felt a pop in the mid calf.  She was unable to straighten her leg and had difficulty bearing weight.  She used crutches that night and was seen in the ED the day after. She has been walking without crutches for the past 2 days. She still has difficulty with straightening her leg.  She does note some aching in the medial thigh since the injury. She notes pain over.   She rates the pain at a  8/10 at its worst and a 1/10 currently.  Symptoms are relieved with activity avoidance and Percocet at night. Symptoms are worsened by knee extension,  She endorses pain.  She denies swelling, bruising, numbness, tingling and weakness.  Other treatment has included ice and oils. She notes difficulty with knee extension.       She is a stay at home mom    Review of Systems:  Musculoskeletal: as above  Remainder of review of systems is negative including constitutional, eyes, ENT, CV, pulmonary, GI, , endocrine, skin, hematologic, and neurologic except as noted in HPI or medical history.    History reviewed. No pertinent past surgical/medical/family/social history other than as mentioned in HPI.    Patient Active Problem List   Diagnosis     S/P laparoscopic cholecystectomy     No past medical history on file.  Past Surgical History:   Procedure Laterality Date      SECTION      5th child     LAPAROSCOPIC CHOLECYSTECTOMY N/A 2017    Procedure: LAPAROSCOPIC CHOLECYSTECTOMY;  Laparoscopic Cholecystectomy;  Surgeon: Cordell Cisneros MD;  Location: [facility] OR     Family History   Problem Relation Age of Onset     Other Cancer Mother      Diabetes Mother      Hypertension Mother      Hypertension Father      Social History  "    Socioeconomic History     Marital status:      Spouse name: Not on file     Number of children: Not on file     Years of education: Not on file     Highest education level: Not on file   Occupational History     Not on file   Social Needs     Financial resource strain: Not on file     Food insecurity     Worry: Not on file     Inability: Not on file     Transportation needs     Medical: Not on file     Non-medical: Not on file   Tobacco Use     Smoking status: Never Smoker     Smokeless tobacco: Never Used   Substance and Sexual Activity     Alcohol use: Not on file     Drug use: Not on file     Sexual activity: Yes     Partners: Male     Birth control/protection: Surgical     Comment:  vast   Lifestyle     Physical activity     Days per week: Not on file     Minutes per session: Not on file     Stress: Not on file   Relationships     Social connections     Talks on phone: Not on file     Gets together: Not on file     Attends [employer] service: Not on file     Active member of club or organization: Not on file     Attends meetings of clubs or organizations: Not on file     Relationship status: Not on file     Intimate partner violence     Fear of current or ex partner: Not on file     Emotionally abused: Not on file     Physically abused: Not on file     Forced sexual activity: Not on file   Other Topics Concern     Parent/sibling w/ CABG, MI or angioplasty before 65F 55M? Not Asked   Social History Narrative     Not on file           Current Outpatient Medications   Medication     oxyCODONE-acetaminophen (PERCOCET) 5-325 MG tablet     calcium carbonate (TUMS) 500 MG chewable tablet     IBUPROFEN PO     No current facility-administered medications for this visit.      Allergies   Allergen Reactions     Erythromycin          Objective:  /88   Pulse 62   Ht 1.755 m (5' 9.09\")   Wt 72.6 kg (160 lb)   BMI 23.56 kg/m      General: Alert and in no distress    Head: Normocephalic, " atraumatic  Eyes: no scleral icterus or conjunctival erythema   Skin: no erythema, petechiae, or jaundice  CV: regular rhythm by palpation, 2+ distal pulses  Resp: normal respiratory effort without conversational dyspnea   Psych: normal mood and affect    Gait: Non-antalgic, appropriate coordination and balance   Neuro: Motor strength and sensation as noted below    Musculoskeletal:    Bilateral Knee, Foot and Ankle Exam:    Inspection:       no visible ecchymosis       no visible edema or effusion    Foot inspection:  Mild hallux valgus, right worse than left    Palpation:  -Tender over the right proximal medial calf    ROM:        Full active ROM with ankle dorsiflexion, plantarflexion, inversion, eversion, great toe dorsiflexion, and great toe plantarflexion    Strength:       knee flexion 5/5 bilaterally       knee extension 5/5 bilaterally       ankle dorsiflexion 5/5 bilaterally       plantarflexion 5/5 bilaterally       inversion 5/5 bilaterally       eversion 5/5 bilaterally       great toe dorsiflexion 5/5 bilaterally       great toe plantarflexion 5/5 bilaterally    Special Tests:  -Difficulty with double heel raise on the right as well as single heel raise on the right.      Neurovascular:       2+ peripheral pulses bilaterally       sensation grossly intact      Radiology:  ULTRASOUND  EXTREMITY NON VASCULAR RIGHT 9/29/2020 3:54 PM     HISTORY: Right calf pain with concerns for either plantaris rupture or  partial Achilles rupture.     COMPARISON: None available                                                                      IMPRESSION: No sonographic abnormality to explain patient's symptoms  of calf and ankle pain.     RAFAEL BHATT MD    Assessment:  1. Strain of right calf muscle        Plan:  Discussed the assessment with the patient and developed a plan together:  -Consider trial of walking boot with ambulation.  She has one at home.    -Work on range of motion of the ankle and knee  -Ice  for 15-20 minutes as needed for soreness.  -Compression as desired for comfort and support.    -Patient's preferred over the counter medications as directed on packaging as needed for pain or soreness.  Please take ibuprofen with food.     -Follow up as needed if symptoms fail to improve or worsen.  Please call with questions or concerns      Sabina Stinson MD, CAQ Sports Medicine  [facility]

## 2020-10-05 ENCOUNTER — OFFICE VISIT (OUTPATIENT)
Dept: ORTHOPEDICS | Facility: CLINIC | Age: 50
End: 2020-10-05
Payer: COMMERCIAL

## 2020-10-05 VITALS
WEIGHT: 160 LBS | HEART RATE: 62 BPM | SYSTOLIC BLOOD PRESSURE: 128 MMHG | HEIGHT: 69 IN | DIASTOLIC BLOOD PRESSURE: 88 MMHG | BODY MASS INDEX: 23.7 KG/M2

## 2020-10-05 DIAGNOSIS — S86.811A STRAIN OF RIGHT CALF MUSCLE: Primary | ICD-10-CM

## 2020-10-05 PROCEDURE — 99203 OFFICE O/P NEW LOW 30 MIN: CPT | Performed by: PHYSICAL MEDICINE & REHABILITATION

## 2020-10-05 ASSESSMENT — MIFFLIN-ST. JEOR: SCORE: 1406.63

## 2020-10-05 NOTE — LETTER
10/5/2020         RE: Janiya Samuels  [address]   [facility] [address]        Dear Colleague,    Thank you for referring your patient, Janiya Samuels, to the [facility] [place]. Please see a copy of my visit note below.    Sports Medicine Clinic Visit    PCP: No Ref-Primary, Physician    CC: Patient presents with:  Musculoskeletal Problem: R calf      HPI:  Janiya Samuels is a 50 year old female who is seen as an ER referral.   She notes right lower leg pain that began 2020 when she was going for a volleyball and felt a pop in the mid calf.  She was unable to straighten her leg and had difficulty bearing weight.  She used crutches that night and was seen in the ED the day after. She has been walking without crutches for the past 2 days. She still has difficulty with straightening her leg.  She does note some aching in the medial thigh since the injury. She notes pain over.   She rates the pain at a  8/10 at its worst and a 1/10 currently.  Symptoms are relieved with activity avoidance and Percocet at night. Symptoms are worsened by knee extension,  She endorses pain.  She denies swelling, bruising, numbness, tingling and weakness.  Other treatment has included ice and oils. She notes difficulty with knee extension.       She is a stay at home mom    Review of Systems:  Musculoskeletal: as above  Remainder of review of systems is negative including constitutional, eyes, ENT, CV, pulmonary, GI, , endocrine, skin, hematologic, and neurologic except as noted in HPI or medical history.    History reviewed. No pertinent past surgical/medical/family/social history other than as mentioned in HPI.    Patient Active Problem List   Diagnosis     S/P laparoscopic cholecystectomy     No past medical history on file.  Past Surgical History:   Procedure Laterality Date      SECTION      5th child     LAPAROSCOPIC CHOLECYSTECTOMY N/A 2017    Procedure: LAPAROSCOPIC  CHOLECYSTECTOMY;  Laparoscopic Cholecystectomy;  Surgeon: Cordell Cisneros MD;  Location: [facility] OR     Family History   Problem Relation Age of Onset     Other Cancer Mother      Diabetes Mother      Hypertension Mother      Hypertension Father      Social History     Socioeconomic History     Marital status:      Spouse name: Not on file     Number of children: Not on file     Years of education: Not on file     Highest education level: Not on file   Occupational History     Not on file   Social Needs     Financial resource strain: Not on file     Food insecurity     Worry: Not on file     Inability: Not on file     Transportation needs     Medical: Not on file     Non-medical: Not on file   Tobacco Use     Smoking status: Never Smoker     Smokeless tobacco: Never Used   Substance and Sexual Activity     Alcohol use: Not on file     Drug use: Not on file     Sexual activity: Yes     Partners: Male     Birth control/protection: Surgical     Comment:  vast   Lifestyle     Physical activity     Days per week: Not on file     Minutes per session: Not on file     Stress: Not on file   Relationships     Social connections     Talks on phone: Not on file     Gets together: Not on file     Attends Holiness service: Not on file     Active member of club or organization: Not on file     Attends meetings of clubs or organizations: Not on file     Relationship status: Not on file     Intimate partner violence     Fear of current or ex partner: Not on file     Emotionally abused: Not on file     Physically abused: Not on file     Forced sexual activity: Not on file   Other Topics Concern     Parent/sibling w/ CABG, MI or angioplasty before 65F 55M? Not Asked   Social History Narrative     Not on file           Current Outpatient Medications   Medication     oxyCODONE-acetaminophen (PERCOCET) 5-325 MG tablet     calcium carbonate (TUMS) 500 MG chewable tablet     IBUPROFEN PO     No current facility-administered  "medications for this visit.      Allergies   Allergen Reactions     Erythromycin          Objective:  /88   Pulse 62   Ht 1.755 m (5' 9.09\")   Wt 72.6 kg (160 lb)   BMI 23.56 kg/m      General: Alert and in no distress    Head: Normocephalic, atraumatic  Eyes: no scleral icterus or conjunctival erythema   Skin: no erythema, petechiae, or jaundice  CV: regular rhythm by palpation, 2+ distal pulses  Resp: normal respiratory effort without conversational dyspnea   Psych: normal mood and affect    Gait: Non-antalgic, appropriate coordination and balance   Neuro: Motor strength and sensation as noted below    Musculoskeletal:    Bilateral Knee, Foot and Ankle Exam:    Inspection:       no visible ecchymosis       no visible edema or effusion    Foot inspection:  Mild hallux valgus, right worse than left    Palpation:  -Tender over the right proximal medial calf    ROM:        Full active ROM with ankle dorsiflexion, plantarflexion, inversion, eversion, great toe dorsiflexion, and great toe plantarflexion    Strength:       knee flexion 5/5 bilaterally       knee extension 5/5 bilaterally       ankle dorsiflexion 5/5 bilaterally       plantarflexion 5/5 bilaterally       inversion 5/5 bilaterally       eversion 5/5 bilaterally       great toe dorsiflexion 5/5 bilaterally       great toe plantarflexion 5/5 bilaterally    Special Tests:  -Difficulty with double heel raise on the right as well as single heel raise on the right.      Neurovascular:       2+ peripheral pulses bilaterally       sensation grossly intact      Radiology:  ULTRASOUND  EXTREMITY NON VASCULAR RIGHT 9/29/2020 3:54 PM     HISTORY: Right calf pain with concerns for either plantaris rupture or  partial Achilles rupture.     COMPARISON: None available                                                                      IMPRESSION: No sonographic abnormality to explain patient's symptoms  of calf and ankle pain.     RAFAEL BHATT, " MD    Assessment:  1. Strain of right calf muscle        Plan:  Discussed the assessment with the patient and developed a plan together:  -Consider trial of walking boot with ambulation.  She has one at home.    -Work on range of motion of the ankle and knee  -Ice for 15-20 minutes as needed for soreness.  -Compression as desired for comfort and support.    -Patient's preferred over the counter medications as directed on packaging as needed for pain or soreness.  Please take ibuprofen with food.     -Follow up as needed if symptoms fail to improve or worsen.  Please call with questions or concerns      Sabina Stinson MD, [facility]  [facility]      Again, thank you for allowing me to participate in the care of your patient.        Sincerely,        Simona Stinson MD

## 2020-10-05 NOTE — PATIENT INSTRUCTIONS
-Consider trial of walking boot with ambulation.    -Work on range of motion of the ankle and knee  -Ice for 15-20 minutes as needed for soreness.  -Compression as desired for comfort and support.    -Patient's preferred over the counter medications as directed on packaging as needed for pain or soreness.  Please take ibuprofen with food.     -Follow up as needed if symptoms fail to improve or worsen.  Please call with questions or concerns.

## 2021-02-28 ENCOUNTER — HEALTH MAINTENANCE LETTER (OUTPATIENT)
Age: 51
End: 2021-02-28

## 2021-10-03 ENCOUNTER — HEALTH MAINTENANCE LETTER (OUTPATIENT)
Age: 51
End: 2021-10-03

## 2021-10-29 ENCOUNTER — HOSPITAL ENCOUNTER (INPATIENT)
Facility: CLINIC | Age: 51
LOS: 11 days | Discharge: HOME | End: 2021-11-10
Attending: PHYSICIAN ASSISTANT | Admitting: FAMILY MEDICINE
Payer: COMMERCIAL

## 2021-10-29 ENCOUNTER — APPOINTMENT (OUTPATIENT)
Dept: CT IMAGING | Facility: CLINIC | Age: 51
End: 2021-10-29
Attending: PHYSICIAN ASSISTANT
Payer: COMMERCIAL

## 2021-10-29 DIAGNOSIS — U07.1 PNEUMONIA DUE TO 2019 NOVEL CORONAVIRUS: ICD-10-CM

## 2021-10-29 DIAGNOSIS — J96.01 ACUTE RESPIRATORY FAILURE WITH HYPOXIA (H): ICD-10-CM

## 2021-10-29 DIAGNOSIS — J12.82 PNEUMONIA DUE TO 2019 NOVEL CORONAVIRUS: ICD-10-CM

## 2021-10-29 LAB
ALBUMIN SERPL-MCNC: 2.4 G/DL (ref 3.4–5)
ALP SERPL-CCNC: 56 U/L (ref 40–150)
ALT SERPL W P-5'-P-CCNC: 77 U/L (ref 0–50)
ANION GAP SERPL CALCULATED.3IONS-SCNC: 5 MMOL/L (ref 3–14)
AST SERPL W P-5'-P-CCNC: 105 U/L (ref 0–45)
BASE EXCESS BLDA CALC-SCNC: 1.3 MMOL/L (ref -9–1.8)
BASE EXCESS BLDV CALC-SCNC: 6.3 MMOL/L (ref -7.7–1.9)
BASOPHILS # BLD AUTO: 0 10E3/UL (ref 0–0.2)
BASOPHILS NFR BLD AUTO: 0 %
BILIRUB SERPL-MCNC: 0.3 MG/DL (ref 0.2–1.3)
BUN SERPL-MCNC: 12 MG/DL (ref 7–30)
CALCIUM SERPL-MCNC: 8 MG/DL (ref 8.5–10.1)
CHLORIDE BLD-SCNC: 98 MMOL/L (ref 94–109)
CO2 SERPL-SCNC: 27 MMOL/L (ref 20–32)
CREAT SERPL-MCNC: 0.69 MG/DL (ref 0.52–1.04)
D DIMER PPP FEU-MCNC: 2.13 UG/ML FEU (ref 0–0.5)
EOSINOPHIL # BLD AUTO: 0 10E3/UL (ref 0–0.7)
EOSINOPHIL NFR BLD AUTO: 0 %
ERYTHROCYTE [DISTWIDTH] IN BLOOD BY AUTOMATED COUNT: 13.9 % (ref 10–15)
FLUAV RNA SPEC QL NAA+PROBE: NEGATIVE
FLUBV RNA RESP QL NAA+PROBE: NEGATIVE
GFR SERPL CREATININE-BSD FRML MDRD: >90 ML/MIN/1.73M2
GLUCOSE BLD-MCNC: 116 MG/DL (ref 70–99)
GLUCOSE BLDC GLUCOMTR-MCNC: 186 MG/DL (ref 70–99)
HCO3 BLD-SCNC: 25 MMOL/L (ref 21–28)
HCO3 BLDV-SCNC: 30 MMOL/L (ref 21–28)
HCT VFR BLD AUTO: 40.4 % (ref 35–47)
HGB BLD-MCNC: 13.9 G/DL (ref 11.7–15.7)
IMM GRANULOCYTES # BLD: 0 10E3/UL
IMM GRANULOCYTES NFR BLD: 1 %
LYMPHOCYTES # BLD AUTO: 0.7 10E3/UL (ref 0.8–5.3)
LYMPHOCYTES NFR BLD AUTO: 19 %
MCH RBC QN AUTO: 29.6 PG (ref 26.5–33)
MCHC RBC AUTO-ENTMCNC: 34.4 G/DL (ref 31.5–36.5)
MCV RBC AUTO: 86 FL (ref 78–100)
MONOCYTES # BLD AUTO: 0.2 10E3/UL (ref 0–1.3)
MONOCYTES NFR BLD AUTO: 6 %
NEUTROPHILS # BLD AUTO: 2.8 10E3/UL (ref 1.6–8.3)
NEUTROPHILS NFR BLD AUTO: 74 %
NRBC # BLD AUTO: 0 10E3/UL
NRBC BLD AUTO-RTO: 0 /100
O2/TOTAL GAS SETTING VFR VENT: 28 %
O2/TOTAL GAS SETTING VFR VENT: 44 %
PCO2 BLD: 37 MM HG (ref 35–45)
PCO2 BLDV: 41 MM HG (ref 40–50)
PH BLD: 7.45 [PH] (ref 7.35–7.45)
PH BLDV: 7.48 [PH] (ref 7.32–7.43)
PLATELET # BLD AUTO: 213 10E3/UL (ref 150–450)
PO2 BLD: 73 MM HG (ref 80–105)
PO2 BLDV: 24 MM HG (ref 25–47)
POTASSIUM BLD-SCNC: 3.8 MMOL/L (ref 3.4–5.3)
PROT SERPL-MCNC: 6.9 G/DL (ref 6.8–8.8)
RBC # BLD AUTO: 4.69 10E6/UL (ref 3.8–5.2)
SARS-COV-2 RNA RESP QL NAA+PROBE: POSITIVE
SODIUM SERPL-SCNC: 130 MMOL/L (ref 133–144)
TROPONIN I SERPL-MCNC: <0.015 UG/L (ref 0–0.04)
WBC # BLD AUTO: 3.8 10E3/UL (ref 4–11)

## 2021-10-29 PROCEDURE — 84484 ASSAY OF TROPONIN QUANT: CPT | Performed by: PHYSICIAN ASSISTANT

## 2021-10-29 PROCEDURE — 93010 ELECTROCARDIOGRAM REPORT: CPT | Performed by: PHYSICIAN ASSISTANT

## 2021-10-29 PROCEDURE — 96375 TX/PRO/DX INJ NEW DRUG ADDON: CPT

## 2021-10-29 PROCEDURE — 71275 CT ANGIOGRAPHY CHEST: CPT

## 2021-10-29 PROCEDURE — 99285 EMERGENCY DEPT VISIT HI MDM: CPT | Mod: 25

## 2021-10-29 PROCEDURE — 99285 EMERGENCY DEPT VISIT HI MDM: CPT | Performed by: PHYSICIAN ASSISTANT

## 2021-10-29 PROCEDURE — 258N000003 HC RX IP 258 OP 636: Performed by: PHYSICIAN ASSISTANT

## 2021-10-29 PROCEDURE — 82040 ASSAY OF SERUM ALBUMIN: CPT | Performed by: PHYSICIAN ASSISTANT

## 2021-10-29 PROCEDURE — 93005 ELECTROCARDIOGRAM TRACING: CPT

## 2021-10-29 PROCEDURE — 85379 FIBRIN DEGRADATION QUANT: CPT | Performed by: PHYSICIAN ASSISTANT

## 2021-10-29 PROCEDURE — 82803 BLOOD GASES ANY COMBINATION: CPT | Performed by: PHYSICIAN ASSISTANT

## 2021-10-29 PROCEDURE — 96361 HYDRATE IV INFUSION ADD-ON: CPT

## 2021-10-29 PROCEDURE — 36600 WITHDRAWAL OF ARTERIAL BLOOD: CPT

## 2021-10-29 PROCEDURE — 36415 COLL VENOUS BLD VENIPUNCTURE: CPT | Performed by: PHYSICIAN ASSISTANT

## 2021-10-29 PROCEDURE — 250N000011 HC RX IP 250 OP 636: Performed by: PHYSICIAN ASSISTANT

## 2021-10-29 PROCEDURE — 85025 COMPLETE CBC W/AUTO DIFF WBC: CPT | Performed by: PHYSICIAN ASSISTANT

## 2021-10-29 PROCEDURE — 87636 SARSCOV2 & INF A&B AMP PRB: CPT | Performed by: PHYSICIAN ASSISTANT

## 2021-10-29 PROCEDURE — 86140 C-REACTIVE PROTEIN: CPT | Performed by: FAMILY MEDICINE

## 2021-10-29 PROCEDURE — 96365 THER/PROPH/DIAG IV INF INIT: CPT | Mod: 59

## 2021-10-29 PROCEDURE — 250N000013 HC RX MED GY IP 250 OP 250 PS 637: Performed by: PHYSICIAN ASSISTANT

## 2021-10-29 PROCEDURE — 250N000009 HC RX 250: Performed by: PHYSICIAN ASSISTANT

## 2021-10-29 PROCEDURE — C9803 HOPD COVID-19 SPEC COLLECT: HCPCS

## 2021-10-29 RX ORDER — SODIUM CHLORIDE, SODIUM LACTATE, POTASSIUM CHLORIDE, CALCIUM CHLORIDE 600; 310; 30; 20 MG/100ML; MG/100ML; MG/100ML; MG/100ML
125 INJECTION, SOLUTION INTRAVENOUS CONTINUOUS
Status: DISCONTINUED | OUTPATIENT
Start: 2021-10-29 | End: 2021-10-30

## 2021-10-29 RX ORDER — IOPAMIDOL 755 MG/ML
500 INJECTION, SOLUTION INTRAVASCULAR ONCE
Status: COMPLETED | OUTPATIENT
Start: 2021-10-29 | End: 2021-10-29

## 2021-10-29 RX ORDER — KETOROLAC TROMETHAMINE 30 MG/ML
30 INJECTION, SOLUTION INTRAMUSCULAR; INTRAVENOUS ONCE
Status: COMPLETED | OUTPATIENT
Start: 2021-10-29 | End: 2021-10-29

## 2021-10-29 RX ORDER — DEXAMETHASONE SODIUM PHOSPHATE 10 MG/ML
6 INJECTION, SOLUTION INTRAMUSCULAR; INTRAVENOUS ONCE
Status: COMPLETED | OUTPATIENT
Start: 2021-10-29 | End: 2021-10-29

## 2021-10-29 RX ORDER — SODIUM CHLORIDE 9 MG/ML
INJECTION, SOLUTION INTRAVENOUS CONTINUOUS
Status: DISCONTINUED | OUTPATIENT
Start: 2021-10-29 | End: 2021-10-30

## 2021-10-29 RX ORDER — ACETAMINOPHEN 325 MG/1
975 TABLET ORAL ONCE
Status: COMPLETED | OUTPATIENT
Start: 2021-10-29 | End: 2021-10-29

## 2021-10-29 RX ADMIN — ACETAMINOPHEN 975 MG: 325 TABLET, FILM COATED ORAL at 13:00

## 2021-10-29 RX ADMIN — REMDESIVIR 200 MG: 100 INJECTION, POWDER, LYOPHILIZED, FOR SOLUTION INTRAVENOUS at 16:22

## 2021-10-29 RX ADMIN — IOPAMIDOL 70 ML: 755 INJECTION, SOLUTION INTRAVENOUS at 13:33

## 2021-10-29 RX ADMIN — SODIUM CHLORIDE 1000 ML: 9 INJECTION, SOLUTION INTRAVENOUS at 13:00

## 2021-10-29 RX ADMIN — SODIUM CHLORIDE, POTASSIUM CHLORIDE, SODIUM LACTATE AND CALCIUM CHLORIDE 125 ML/HR: 600; 310; 30; 20 INJECTION, SOLUTION INTRAVENOUS at 20:52

## 2021-10-29 RX ADMIN — KETOROLAC TROMETHAMINE 30 MG: 30 INJECTION, SOLUTION INTRAMUSCULAR at 13:01

## 2021-10-29 RX ADMIN — SODIUM CHLORIDE, POTASSIUM CHLORIDE, SODIUM LACTATE AND CALCIUM CHLORIDE 1000 ML: 600; 310; 30; 20 INJECTION, SOLUTION INTRAVENOUS at 19:50

## 2021-10-29 RX ADMIN — SODIUM CHLORIDE: 9 INJECTION, SOLUTION INTRAVENOUS at 17:32

## 2021-10-29 RX ADMIN — SODIUM CHLORIDE 50 ML: 9 INJECTION, SOLUTION INTRAVENOUS at 17:32

## 2021-10-29 RX ADMIN — DEXAMETHASONE SODIUM PHOSPHATE 6 MG: 10 INJECTION INTRAMUSCULAR; INTRAVENOUS at 13:02

## 2021-10-29 RX ADMIN — SODIUM CHLORIDE 70 ML: 9 INJECTION, SOLUTION INTRAVENOUS at 13:32

## 2021-10-29 NOTE — ED NOTES
Pt was not able to tolerate being off of oxygen, sats did not maintain above 90%, will update provider

## 2021-10-29 NOTE — ED PROVIDER NOTES
History     Chief Complaint   Patient presents with     Suspected Covid     HPI  Janiya Samuels is a 51 year old female who presents for evaluation of URI symptoms for the last 1 week.  Mostly dry cough but occasionally productive of yellow phlegm.  Episodes of dyspnea and chest discomfort with coughing.  She reports headache, generalized myalgias, episodes of dizziness, generalized weakness, fatigue, fever up to 100.  Patient reports that most of her family has tested positive for COVID-19.  She was not immunized.  She has had Tylenol or ibuprofen off and on for symptoms with little relief, with last dose being yesterday.  No recent travel.  Denies a prior history of asthma or COPD.          Allergies:  Allergies   Allergen Reactions     Erythromycin        Problem List:    Patient Active Problem List    Diagnosis Date Noted     S/P laparoscopic cholecystectomy 2017     Priority: Medium        Past Medical History:    History reviewed. No pertinent past medical history.    Past Surgical History:    Past Surgical History:   Procedure Laterality Date      SECTION      4th child     LAPAROSCOPIC CHOLECYSTECTOMY N/A 2017    Procedure: LAPAROSCOPIC CHOLECYSTECTOMY;  Laparoscopic Cholecystectomy;  Surgeon: Cordell Cisneros MD;  Location:  OR       Family History:    Family History   Problem Relation Age of Onset     Other Cancer Mother      Diabetes Mother      Hypertension Mother      Hypertension Father        Social History:  Marital Status:   [2]  Social History     Tobacco Use     Smoking status: Never Smoker     Smokeless tobacco: Never Used   Substance Use Topics     Alcohol use: Yes     Comment: occ     Drug use: Not Currently        Medications:    calcium carbonate (TUMS) 500 MG chewable tablet  IBUPROFEN PO  oxyCODONE-acetaminophen (PERCOCET) 5-325 MG tablet          Review of Systems   All other systems reviewed and are negative.      Physical Exam   BP: 127/83  Pulse:  92  Temp: 99.4  F (37.4  C)  Resp: 16  Weight: 88.5 kg (195 lb)  SpO2: 95 %      Physical Exam  Vitals and nursing note reviewed.   Constitutional:       General: She is not in acute distress.     Appearance: She is not diaphoretic.   HENT:      Head: Normocephalic and atraumatic.      Right Ear: Tympanic membrane, ear canal and external ear normal.      Left Ear: Tympanic membrane, ear canal and external ear normal.      Nose: Nose normal. No congestion or rhinorrhea.      Mouth/Throat:      Mouth: Mucous membranes are dry.      Pharynx: No oropharyngeal exudate.   Eyes:      General: No scleral icterus.        Right eye: No discharge.         Left eye: No discharge.      Conjunctiva/sclera: Conjunctivae normal.      Pupils: Pupils are equal, round, and reactive to light.   Neck:      Thyroid: No thyromegaly.   Cardiovascular:      Rate and Rhythm: Normal rate and regular rhythm.      Heart sounds: Normal heart sounds. No murmur heard.     Pulmonary:      Effort: Pulmonary effort is normal. No respiratory distress.      Breath sounds: Normal breath sounds. No wheezing or rales.   Chest:      Chest wall: No tenderness.   Abdominal:      General: Bowel sounds are normal. There is no distension.      Palpations: Abdomen is soft. There is no mass.      Tenderness: There is no abdominal tenderness. There is no right CVA tenderness, left CVA tenderness, guarding or rebound.   Musculoskeletal:         General: No tenderness or deformity. Normal range of motion.      Cervical back: Normal range of motion and neck supple. No rigidity or tenderness.      Right lower leg: No edema.      Left lower leg: No edema.      Comments: Negative Deshawn's sign.   Lymphadenopathy:      Cervical: No cervical adenopathy.   Skin:     General: Skin is warm and dry.      Capillary Refill: Capillary refill takes less than 2 seconds.      Findings: No erythema or rash.   Neurological:      Mental Status: She is alert and oriented to person,  place, and time.      Cranial Nerves: No cranial nerve deficit.   Psychiatric:         Behavior: Behavior normal.         Thought Content: Thought content normal.         ED Course     3:45 PM - John, respiratory therapist, evaluated the patient for possible home oxygen therapy given that we do not have any open beds at our facility or any facilities within 150 miles.  He was not comfortable with her returning home.  He was concerned about her shallow breathing and felt like she would likely be a rebound ED evaluation.  Therefore, we will need to board her in the ED.  We will still certainly look for open beds at other facilities.  I am going to start her on remdesivir.  She has already received her Decadron dose.  Patient states that symptomatically she is feeling better, but we are unable to wean her off of the supplemental oxygen at this time.    7:44 PM -I reevaluated the patient.  Vital signs were reviewed.  Over the last hour her blood pressure has decreased down to about 92 systolic.  It was previous at about 129 systolic.  She is on 6 L of oxygen.  She appears to be slightly confused.  Neurologically intact without any focal abnormalities on full exam.  Lung fields are normal without any rales or rhonchi.  Does not appear to have any fluid overload.  She does not feel more dyspneic while supine.  I am concerned that she might be retaining CO2 given her shallow breaths that have been noticed ever since she has been present in the ED.  Respiratory therapy will check an ABG.  I am concerned that she is still dehydrated given her lower blood pressures.  She has not urinated since arriving in the ED and she has received 1 L of IV fluids, but she has been here 7.5 hours now.  I am going to switch her to LR and provide another bolus to see if we can get improvement in her blood pressure.  Will monitor situation closely.         Procedures              EKG Interpretation:      Interpreted by Nawaf Khoury,  DONELL  Time reviewed: 1320  Symptoms at time of EKG: dyspnea   Rhythm: normal sinus   Rate: normal  Axis: normal  Ectopy: none  Conduction: normal  ST Segments/ T Waves: No ST-T wave changes  Q Waves: none  Comparison to prior: No old EKG available    Clinical Impression: normal EKG          Critical Care time:  none               Results for orders placed or performed during the hospital encounter of 10/29/21 (from the past 24 hour(s))   Symptomatic Influenza A/B & SARS-CoV2 (COVID-19) Virus PCR Multiplex Nasopharyngeal    Specimen: Nasopharyngeal; Swab   Result Value Ref Range    Influenza A target Negative Negative    Influenza B target Negative Negative    SARS CoV2 PCR Positive (A) Negative    Narrative    Testing was performed using the myesha SARS-CoV-2 & Influenza A/B Assay on the myesha Whit System. This test should be ordered for the detection of SARS-CoV-2 and influenza viruses in individuals who meet clinical and/or epidemiological criteria. Test performance is unknown in asymptomatic patients. This test is for in vitro diagnostic use under the FDA EUA for laboratories certified under CLIA to perform moderate and/or high complexity testing. This test has not been FDA cleared or approved. A negative result does not rule out the presence of PCR inhibitors in the specimen or target RNA in concentration below the limit of detection for the assay. If only one viral target is positive but coinfection with multiple targets is suspected, the sample should be re-tested with another FDA cleared, approved or authorized test, if coinfection would change clinical management. [facility] are certified under the Clinical Laboratory Improvement Amendments of 1988 (CLIA-88) as  qualified to perform moderate and/or high complexity laboratory testing.   CBC with platelets differential    Narrative    The following orders were created for panel order CBC with platelets differential.  Procedure                                Abnormality         Status                     ---------                               -----------         ------                     CBC with platelets and d...[[id number]]  Abnormal            Final result                 Please view results for these tests on the individual orders.   D dimer quantitative   Result Value Ref Range    D-Dimer Quantitative 2.13 (H) 0.00 - 0.50 ug/mL FEU    Narrative    This D-dimer assay is intended for use in conjunction with a clinical pretest probability assessment model to exclude pulmonary embolism (PE) and deep venous thrombosis (DVT) in outpatients suspected of PE or DVT. The cut-off value is 0.50 ug/mL FEU.   Comprehensive metabolic panel   Result Value Ref Range    Sodium 130 (L) 133 - 144 mmol/L    Potassium 3.8 3.4 - 5.3 mmol/L    Chloride 98 94 - 109 mmol/L    Carbon Dioxide (CO2) 27 20 - 32 mmol/L    Anion Gap 5 3 - 14 mmol/L    Urea Nitrogen 12 7 - 30 mg/dL    Creatinine 0.69 0.52 - 1.04 mg/dL    Calcium 8.0 (L) 8.5 - 10.1 mg/dL    Glucose 116 (H) 70 - 99 mg/dL    Alkaline Phosphatase 56 40 - 150 U/L     (H) 0 - 45 U/L    ALT 77 (H) 0 - 50 U/L    Protein Total 6.9 6.8 - 8.8 g/dL    Albumin 2.4 (L) 3.4 - 5.0 g/dL    Bilirubin Total 0.3 0.2 - 1.3 mg/dL    GFR Estimate >90 >60 mL/min/1.73m2   Troponin I   Result Value Ref Range    Troponin I <0.015 0.000 - 0.045 ug/L   Blood gas venous   Result Value Ref Range    pH Venous 7.48 (H) 7.32 - 7.43    pCO2 Venous 41 40 - 50 mm Hg    pO2 Venous 24 (L) 25 - 47 mm Hg    Bicarbonate Venous 30 (H) 21 - 28 mmol/L    Base Excess/Deficit (+/-) 6.3 (H) -7.7 - 1.9 mmol/L    FIO2 28    CBC with platelets and differential   Result Value Ref Range    WBC Count 3.8 (L) 4.0 - 11.0 10e3/uL    RBC Count 4.69 3.80 - 5.20 10e6/uL    Hemoglobin 13.9 11.7 - 15.7 g/dL    Hematocrit 40.4 35.0 - 47.0 %    MCV 86 78 - 100 fL    MCH 29.6 26.5 - 33.0 pg    MCHC 34.4 31.5 - 36.5 g/dL    RDW 13.9 10.0 - 15.0 %    Platelet Count 213 150  - 450 10e3/uL    % Neutrophils 74 %    % Lymphocytes 19 %    % Monocytes 6 %    % Eosinophils 0 %    % Basophils 0 %    % Immature Granulocytes 1 %    NRBCs per 100 WBC 0 <1 /100    Absolute Neutrophils 2.8 1.6 - 8.3 10e3/uL    Absolute Lymphocytes 0.7 (L) 0.8 - 5.3 10e3/uL    Absolute Monocytes 0.2 0.0 - 1.3 10e3/uL    Absolute Eosinophils 0.0 0.0 - 0.7 10e3/uL    Absolute Basophils 0.0 0.0 - 0.2 10e3/uL    Absolute Immature Granulocytes 0.0 <=0.0 10e3/uL    Absolute NRBCs 0.0 10e3/uL   CT Chest Pulmonary Embolism w Contrast    Narrative    CT CHEST PULMONARY EMBOLISM WITH CONTRAST  10/29/2021 1:47 PM    HISTORY:  PE suspected, low/intermediate prob, positive D-dimer.    TECHNIQUE: Scans obtained from the apices through the diaphragm with  IV contrast. 70mL, Isovue-370 IV injected. Radiation dose for this  scan was reduced using automated exposure control, adjustment of the  mA and/or kV according to patient size, or iterative reconstruction  technique.    COMPARISON:  None available.    FINDINGS:  Chest/mediastinum: No evidence of pulmonary embolism. No cardiomegaly  or significant pericardial effusion. Multiple calcified mediastinal  and right hilar granulomas. Multiple prominent mediastinal and hilar  lymph nodes, likely reactive. Small hiatal hernia.    Lungs and pleura: No pleural effusion or pneumothorax. Bilateral  patchy groundglass pulmonary opacities, worrisome for infection  including atypical infection like COVID-19.    Upper abdomen: Limited evaluation of the upper abdomen due to lack of  coverage and timing of contrast. Partially visualized 3 mm stone lower  pole right kidney. Multiple calcified splenic granulomas.    Bones and soft tissue: No suspicious osseous lesion.      Impression    IMPRESSION:   1. No evidence of pulmonary embolism.  2. Bilateral patchy groundglass pulmonary opacities, worrisome for  infection including atypical infection like COVID-19.  3. Multiple prominent mediastinal and  hilar lymph nodes, likely  reactive.  4. Sequel of chronic granulomatous disease evidenced by multiple  calcified mediastinal, right hilar and splenic granulomas.  5. Small hiatal hernia.  6. 3 mm right kidney stone.    RAFAEL BHATT MD         SYSTEM ID:  [id number]       Medications   0.9% sodium chloride BOLUS (0 mLs Intravenous Stopped 10/29/21 1410)     Followed by   sodium chloride 0.9% infusion ( Intravenous New Bag 10/29/21 1732)   remdesivir 100 mg in sodium chloride 0.9 % 250 mL intermittent infusion (has no administration in time range)     And   0.9% sodium chloride BOLUS (has no administration in time range)   acetaminophen (TYLENOL) tablet 975 mg (975 mg Oral Given 10/29/21 1300)   ketorolac (TORADOL) injection 30 mg (30 mg Intravenous Given 10/29/21 1301)   dexamethasone PF (DECADRON) injection 6 mg (6 mg Intravenous Given 10/29/21 1302)   Saline 100mL Bag (70 mLs Intravenous Given 10/29/21 1332)   iopamidol (ISOVUE-370) solution 500 mL (70 mLs Intravenous Given 10/29/21 1333)   sodium chloride (PF) 0.9% PF flush 3 mL (3 mLs Intravenous Given 10/29/21 1332)   remdesivir 200 mg in sodium chloride 0.9 % 250 mL intermittent infusion (0 mg Intravenous Stopped 10/29/21 1730)     Followed by   0.9% sodium chloride BOLUS (50 mLs Intravenous Started 10/29/21 1732)       Assessments & Plan (with Medical Decision Making)     Acute respiratory failure with hypoxia (H)  Pneumonia due to 2019 novel coronavirus     51 year old female presents for evaluation of URI symptoms for the past 1 week.  Dry cough occasionally productive of yellow phlegm, chest discomfort with coughing, episodes of dyspnea, dizziness, generalized weakness, fatigue, fever up to 100, chills, generalized myalgias, headache, and loss of taste sensation.  Exposure to many family members with COVID-19.  She is not immunized against COVID-19.  On exam patient is hypoxic down to 87% on room air.  She was placed on 2 L of oxygen by nasal cannula  which brought it up to 94%.  Blood pressure 127/83, temperature 99.4, pulse 92, respirations 16.  She has very dry oral mucous membranes.  Remainder the exam is otherwise reassuring.  Negative Homans' sign.  No lower extremity edema.  Patient's overall symptom course and exam consistent with COVID-19.  IV was established and she was given IV Toradol, IV Decadron, and p.o. Tylenol.  EKG reviewed by myself.  No ST or T wave change.  No arrhythmia.  Laboratory levels with leukopenia down to 3800 with normal differential.  Hemoglobin stable at 13.9.  Platelet count normal at 213,000.  Comprehensive metabolic panel with a mildly low sodium at 130.  Nonfasting glucose mildly elevated 116.  Typical AST and ALT elevation up to 105 and 77 respectively consistent with COVID-19.  Renal function normal.  Troponin undetectable.  Venous blood gas with mild elevation in the pH up to 7.48.  PCO2 41 and PO2 24.  Venous bicarb 30.  CT PE study with bilateral patchy groundglass pulmonary opacities consistent with COVID-19.  Reactive mediastinal and hilar lymph nodes.  Small hiatal hernia.  3 mm right-sided kidney stone which is not consistent with her current clinical presentation.  Patient was on 2 L of oxygen by nasal cannula here in the ED.  After giving her the above-noted medication regimen including Decadron, we tried to wean her off of oxygen to see if she had stabilized.  Unfortunately, she became hypoxic again.  We do not have any beds in our facility.  There are no hospital beds within 150 mile radius as well.  We have called multiple hospitals for patients just prior to her.  Ultimately, we are going to need to keep her in our ED unless a bed opens up somewhere.  I did order a dose of remdesivir.      Patient continues on 10 L of oxygen at the time of shift change.  Respiratory therapy is involved with her case and is constantly checking on her.  She has no further complaints.  Dr. Mcneil has kindly agreed to accept her  care at shift change.  Please see his note for further details of her care.     I have reviewed the nursing notes.    I have reviewed the findings, diagnosis, plan and need for follow up with the patient.       New Prescriptions    No medications on file       Final diagnoses:   Acute respiratory failure with hypoxia (H)   Pneumonia due to 2019 novel coronavirus       Disclaimer: This note consists of symbols derived from keyboarding, dictation and/or voice recognition software. As a result, there may be errors in the script that have gone undetected. Please consider this when interpreting information found in this chart.      10/29/2021   [facility] EMERGENCY DEPT     Nawaf Khoury PA-C  10/30/21 2231

## 2021-10-29 NOTE — ED TRIAGE NOTES
Pt comes in with SOB, body aches, cough, fever, weakness. Pts family was diagnosed with COVID this week.

## 2021-10-30 ENCOUNTER — APPOINTMENT (OUTPATIENT)
Dept: GENERAL RADIOLOGY | Facility: CLINIC | Age: 51
End: 2021-10-30
Attending: FAMILY MEDICINE
Payer: COMMERCIAL

## 2021-10-30 ENCOUNTER — ANESTHESIA EVENT (OUTPATIENT)
Dept: INTENSIVE CARE | Facility: CLINIC | Age: 51
End: 2021-10-30
Payer: COMMERCIAL

## 2021-10-30 ENCOUNTER — ANESTHESIA (OUTPATIENT)
Dept: INTENSIVE CARE | Facility: CLINIC | Age: 51
End: 2021-10-30
Payer: COMMERCIAL

## 2021-10-30 PROBLEM — J96.01 ACUTE RESPIRATORY FAILURE WITH HYPOXIA (H): Status: ACTIVE | Noted: 2021-10-30

## 2021-10-30 PROBLEM — J12.82 PNEUMONIA DUE TO 2019 NOVEL CORONAVIRUS: Status: ACTIVE | Noted: 2021-10-30

## 2021-10-30 PROBLEM — U07.1 PNEUMONIA DUE TO 2019 NOVEL CORONAVIRUS: Status: ACTIVE | Noted: 2021-10-30

## 2021-10-30 PROBLEM — R74.01 TRANSAMINITIS: Status: ACTIVE | Noted: 2021-10-30

## 2021-10-30 LAB
ALBUMIN SERPL-MCNC: 2.1 G/DL (ref 3.4–5)
ALP SERPL-CCNC: 55 U/L (ref 40–150)
ALT SERPL W P-5'-P-CCNC: 65 U/L (ref 0–50)
ANION GAP SERPL CALCULATED.3IONS-SCNC: 3 MMOL/L (ref 3–14)
APTT PPP: 29 SECONDS (ref 22–38)
AST SERPL W P-5'-P-CCNC: 73 U/L (ref 0–45)
BASE EXCESS BLDA CALC-SCNC: 0.9 MMOL/L (ref -9–1.8)
BASE EXCESS BLDA CALC-SCNC: 1.5 MMOL/L (ref -9–1.8)
BILIRUB SERPL-MCNC: 0.2 MG/DL (ref 0.2–1.3)
BUN SERPL-MCNC: 15 MG/DL (ref 7–30)
CALCIUM SERPL-MCNC: 8 MG/DL (ref 8.5–10.1)
CHLORIDE BLD-SCNC: 109 MMOL/L (ref 94–109)
CO2 SERPL-SCNC: 26 MMOL/L (ref 20–32)
CREAT SERPL-MCNC: 0.42 MG/DL (ref 0.52–1.04)
CRP SERPL-MCNC: 115 MG/L (ref 0–8)
CRP SERPL-MCNC: 125 MG/L (ref 0–8)
D DIMER PPP FEU-MCNC: 2.96 UG/ML FEU (ref 0–0.5)
ERYTHROCYTE [DISTWIDTH] IN BLOOD BY AUTOMATED COUNT: 14.2 % (ref 10–15)
FIBRINOGEN PPP-MCNC: 642 MG/DL (ref 170–490)
GFR SERPL CREATININE-BSD FRML MDRD: >90 ML/MIN/1.73M2
GLUCOSE BLD-MCNC: 158 MG/DL (ref 70–99)
GLUCOSE BLDC GLUCOMTR-MCNC: 165 MG/DL (ref 70–99)
GLUCOSE BLDC GLUCOMTR-MCNC: 169 MG/DL (ref 70–99)
GLUCOSE BLDC GLUCOMTR-MCNC: 170 MG/DL (ref 70–99)
HBA1C MFR BLD: 6.2 % (ref 0–5.6)
HCO3 BLD-SCNC: 24 MMOL/L (ref 21–28)
HCO3 BLD-SCNC: 25 MMOL/L (ref 21–28)
HCT VFR BLD AUTO: 40.3 % (ref 35–47)
HGB BLD-MCNC: 13.6 G/DL (ref 11.7–15.7)
INR PPP: 1.07 (ref 0.85–1.15)
LDH SERPL L TO P-CCNC: 454 U/L (ref 81–234)
MCH RBC QN AUTO: 29.4 PG (ref 26.5–33)
MCHC RBC AUTO-ENTMCNC: 33.7 G/DL (ref 31.5–36.5)
MCV RBC AUTO: 87 FL (ref 78–100)
O2/TOTAL GAS SETTING VFR VENT: 65 %
O2/TOTAL GAS SETTING VFR VENT: 80 %
PCO2 BLD: 34 MM HG (ref 35–45)
PCO2 BLD: 35 MM HG (ref 35–45)
PH BLD: 7.46 [PH] (ref 7.35–7.45)
PH BLD: 7.46 [PH] (ref 7.35–7.45)
PLATELET # BLD AUTO: 288 10E3/UL (ref 150–450)
PO2 BLD: 104 MM HG (ref 80–105)
PO2 BLD: 169 MM HG (ref 80–105)
POTASSIUM BLD-SCNC: 4.2 MMOL/L (ref 3.4–5.3)
PROT SERPL-MCNC: 6.4 G/DL (ref 6.8–8.8)
RBC # BLD AUTO: 4.62 10E6/UL (ref 3.8–5.2)
SODIUM SERPL-SCNC: 138 MMOL/L (ref 133–144)
TROPONIN I SERPL-MCNC: <0.015 UG/L (ref 0–0.04)
WBC # BLD AUTO: 4.4 10E3/UL (ref 4–11)

## 2021-10-30 PROCEDURE — 272N000024 HC KIT POWER PICC DOUBLE LUMEN

## 2021-10-30 PROCEDURE — 999N000157 HC STATISTIC RCP TIME EA 10 MIN

## 2021-10-30 PROCEDURE — 370N000003 HC ANESTHESIA WARD SERVICE

## 2021-10-30 PROCEDURE — 250N000011 HC RX IP 250 OP 636: Performed by: FAMILY MEDICINE

## 2021-10-30 PROCEDURE — 36569 INSJ PICC 5 YR+ W/O IMAGING: CPT

## 2021-10-30 PROCEDURE — 36600 WITHDRAWAL OF ARTERIAL BLOOD: CPT

## 2021-10-30 PROCEDURE — 83036 HEMOGLOBIN GLYCOSYLATED A1C: CPT | Performed by: FAMILY MEDICINE

## 2021-10-30 PROCEDURE — 36415 COLL VENOUS BLD VENIPUNCTURE: CPT | Performed by: FAMILY MEDICINE

## 2021-10-30 PROCEDURE — 999N000065 XR CHEST PORT 1 VIEW

## 2021-10-30 PROCEDURE — 94660 CPAP INITIATION&MGMT: CPT

## 2021-10-30 PROCEDURE — 96376 TX/PRO/DX INJ SAME DRUG ADON: CPT

## 2021-10-30 PROCEDURE — 258N000003 HC RX IP 258 OP 636: Performed by: EMERGENCY MEDICINE

## 2021-10-30 PROCEDURE — 258N000003 HC RX IP 258 OP 636: Performed by: FAMILY MEDICINE

## 2021-10-30 PROCEDURE — XW033E5 INTRODUCTION OF REMDESIVIR ANTI-INFECTIVE INTO PERIPHERAL VEIN, PERCUTANEOUS APPROACH, NEW TECHNOLOGY GROUP 5: ICD-10-PCS | Performed by: PEDIATRICS

## 2021-10-30 PROCEDURE — 85730 THROMBOPLASTIN TIME PARTIAL: CPT | Performed by: FAMILY MEDICINE

## 2021-10-30 PROCEDURE — 85379 FIBRIN DEGRADATION QUANT: CPT | Performed by: FAMILY MEDICINE

## 2021-10-30 PROCEDURE — 99291 CRITICAL CARE FIRST HOUR: CPT | Mod: AI | Performed by: FAMILY MEDICINE

## 2021-10-30 PROCEDURE — 85384 FIBRINOGEN ACTIVITY: CPT | Performed by: FAMILY MEDICINE

## 2021-10-30 PROCEDURE — 96375 TX/PRO/DX INJ NEW DRUG ADDON: CPT

## 2021-10-30 PROCEDURE — 5A09457 ASSISTANCE WITH RESPIRATORY VENTILATION, 24-96 CONSECUTIVE HOURS, CONTINUOUS POSITIVE AIRWAY PRESSURE: ICD-10-PCS | Performed by: PEDIATRICS

## 2021-10-30 PROCEDURE — 250N000012 HC RX MED GY IP 250 OP 636 PS 637: Performed by: FAMILY MEDICINE

## 2021-10-30 PROCEDURE — 82803 BLOOD GASES ANY COMBINATION: CPT | Performed by: FAMILY MEDICINE

## 2021-10-30 PROCEDURE — 86140 C-REACTIVE PROTEIN: CPT | Performed by: FAMILY MEDICINE

## 2021-10-30 PROCEDURE — 82040 ASSAY OF SERUM ALBUMIN: CPT | Performed by: FAMILY MEDICINE

## 2021-10-30 PROCEDURE — 96367 TX/PROPH/DG ADDL SEQ IV INF: CPT | Mod: 59

## 2021-10-30 PROCEDURE — 250N000011 HC RX IP 250 OP 636: Performed by: EMERGENCY MEDICINE

## 2021-10-30 PROCEDURE — 999N000215 HC STATISTIC HFNC ADULT NON-CPAP

## 2021-10-30 PROCEDURE — 200N000001 HC R&B ICU

## 2021-10-30 PROCEDURE — 83615 LACTATE (LD) (LDH) ENZYME: CPT | Performed by: FAMILY MEDICINE

## 2021-10-30 PROCEDURE — 85610 PROTHROMBIN TIME: CPT | Performed by: FAMILY MEDICINE

## 2021-10-30 PROCEDURE — 84484 ASSAY OF TROPONIN QUANT: CPT | Performed by: FAMILY MEDICINE

## 2021-10-30 PROCEDURE — 85027 COMPLETE CBC AUTOMATED: CPT | Performed by: FAMILY MEDICINE

## 2021-10-30 PROCEDURE — 258N000003 HC RX IP 258 OP 636: Performed by: PHYSICIAN ASSISTANT

## 2021-10-30 PROCEDURE — 250N000009 HC RX 250: Performed by: FAMILY MEDICINE

## 2021-10-30 PROCEDURE — 96372 THER/PROPH/DIAG INJ SC/IM: CPT | Mod: 59

## 2021-10-30 RX ORDER — LORAZEPAM 2 MG/ML
1 INJECTION INTRAMUSCULAR ONCE
Status: COMPLETED | OUTPATIENT
Start: 2021-10-30 | End: 2021-10-30

## 2021-10-30 RX ORDER — NALOXONE HYDROCHLORIDE 0.4 MG/ML
0.4 INJECTION, SOLUTION INTRAMUSCULAR; INTRAVENOUS; SUBCUTANEOUS
Status: DISCONTINUED | OUTPATIENT
Start: 2021-10-30 | End: 2021-11-10 | Stop reason: HOSPADM

## 2021-10-30 RX ORDER — PROCHLORPERAZINE MALEATE 5 MG
10 TABLET ORAL EVERY 6 HOURS PRN
Status: DISCONTINUED | OUTPATIENT
Start: 2021-10-30 | End: 2021-11-10 | Stop reason: HOSPADM

## 2021-10-30 RX ORDER — NALOXONE HYDROCHLORIDE 0.4 MG/ML
0.2 INJECTION, SOLUTION INTRAMUSCULAR; INTRAVENOUS; SUBCUTANEOUS
Status: DISCONTINUED | OUTPATIENT
Start: 2021-10-30 | End: 2021-11-10 | Stop reason: HOSPADM

## 2021-10-30 RX ORDER — ONDANSETRON 4 MG/1
4 TABLET, ORALLY DISINTEGRATING ORAL EVERY 6 HOURS PRN
Status: DISCONTINUED | OUTPATIENT
Start: 2021-10-30 | End: 2021-11-10 | Stop reason: HOSPADM

## 2021-10-30 RX ORDER — DEXAMETHASONE SODIUM PHOSPHATE 10 MG/ML
6 INJECTION, SOLUTION INTRAMUSCULAR; INTRAVENOUS EVERY 24 HOURS
Status: DISCONTINUED | OUTPATIENT
Start: 2021-10-31 | End: 2021-11-04

## 2021-10-30 RX ORDER — LIDOCAINE 40 MG/G
CREAM TOPICAL
Status: ACTIVE | OUTPATIENT
Start: 2021-10-30 | End: 2021-11-02

## 2021-10-30 RX ORDER — PROCHLORPERAZINE 25 MG
25 SUPPOSITORY, RECTAL RECTAL EVERY 12 HOURS PRN
Status: DISCONTINUED | OUTPATIENT
Start: 2021-10-30 | End: 2021-11-10 | Stop reason: HOSPADM

## 2021-10-30 RX ORDER — NICOTINE POLACRILEX 4 MG
15-30 LOZENGE BUCCAL
Status: DISCONTINUED | OUTPATIENT
Start: 2021-10-30 | End: 2021-11-10 | Stop reason: HOSPADM

## 2021-10-30 RX ORDER — DEXAMETHASONE SODIUM PHOSPHATE 10 MG/ML
6 INJECTION, SOLUTION INTRAMUSCULAR; INTRAVENOUS EVERY 24 HOURS
Status: DISCONTINUED | OUTPATIENT
Start: 2021-10-30 | End: 2021-10-30

## 2021-10-30 RX ORDER — LORAZEPAM 2 MG/ML
0.5 INJECTION INTRAMUSCULAR EVERY 4 HOURS PRN
Status: DISCONTINUED | OUTPATIENT
Start: 2021-10-30 | End: 2021-11-10 | Stop reason: HOSPADM

## 2021-10-30 RX ORDER — ONDANSETRON 2 MG/ML
4 INJECTION INTRAMUSCULAR; INTRAVENOUS EVERY 6 HOURS PRN
Status: DISCONTINUED | OUTPATIENT
Start: 2021-10-30 | End: 2021-11-10 | Stop reason: HOSPADM

## 2021-10-30 RX ORDER — HYDROMORPHONE HCL IN WATER/PF 6 MG/30 ML
0.2 PATIENT CONTROLLED ANALGESIA SYRINGE INTRAVENOUS
Status: DISCONTINUED | OUTPATIENT
Start: 2021-10-30 | End: 2021-11-02

## 2021-10-30 RX ORDER — DEXTROSE MONOHYDRATE 25 G/50ML
25-50 INJECTION, SOLUTION INTRAVENOUS
Status: DISCONTINUED | OUTPATIENT
Start: 2021-10-30 | End: 2021-11-10 | Stop reason: HOSPADM

## 2021-10-30 RX ADMIN — INSULIN ASPART 1 UNITS: 100 INJECTION, SOLUTION INTRAVENOUS; SUBCUTANEOUS at 17:52

## 2021-10-30 RX ADMIN — SODIUM CHLORIDE 50 ML: 9 INJECTION, SOLUTION INTRAVENOUS at 17:52

## 2021-10-30 RX ADMIN — ENOXAPARIN SODIUM 40 MG: 40 INJECTION SUBCUTANEOUS at 21:33

## 2021-10-30 RX ADMIN — LORAZEPAM 1 MG: 2 INJECTION INTRAMUSCULAR; INTRAVENOUS at 07:47

## 2021-10-30 RX ADMIN — ENOXAPARIN SODIUM 40 MG: 40 INJECTION SUBCUTANEOUS at 11:53

## 2021-10-30 RX ADMIN — DEXAMETHASONE SODIUM PHOSPHATE 6 MG: 10 INJECTION INTRAMUSCULAR; INTRAVENOUS at 09:12

## 2021-10-30 RX ADMIN — INSULIN ASPART 1 UNITS: 100 INJECTION, SOLUTION INTRAVENOUS; SUBCUTANEOUS at 15:01

## 2021-10-30 RX ADMIN — TOCILIZUMAB 720 MG: 20 INJECTION, SOLUTION, CONCENTRATE INTRAVENOUS at 09:15

## 2021-10-30 RX ADMIN — SODIUM CHLORIDE, POTASSIUM CHLORIDE, SODIUM LACTATE AND CALCIUM CHLORIDE 125 ML/HR: 600; 310; 30; 20 INJECTION, SOLUTION INTRAVENOUS at 05:06

## 2021-10-30 RX ADMIN — REMDESIVIR 100 MG: 100 INJECTION, POWDER, LYOPHILIZED, FOR SOLUTION INTRAVENOUS at 16:17

## 2021-10-30 RX ADMIN — INSULIN ASPART 1 UNITS: 100 INJECTION, SOLUTION INTRAVENOUS; SUBCUTANEOUS at 21:32

## 2021-10-30 ASSESSMENT — ACTIVITIES OF DAILY LIVING (ADL)
ADLS_ACUITY_SCORE: 3
ADLS_ACUITY_SCORE: 7
ADLS_ACUITY_SCORE: 3
ADLS_ACUITY_SCORE: 7
ADLS_ACUITY_SCORE: 3
ADLS_ACUITY_SCORE: 7
ADLS_ACUITY_SCORE: 7
ADLS_ACUITY_SCORE: 3

## 2021-10-30 ASSESSMENT — MIFFLIN-ST. JEOR: SCORE: 1561.16

## 2021-10-30 NOTE — PROGRESS NOTES
PF ratio of 130. on 80% BiPAP with settings of 14/10.    Ethan Patel, RT on 10/30/2021 at 11:37 AM

## 2021-10-30 NOTE — PROGRESS NOTES
Pt placed on HHFNC at 30 lpm and 100% FiO2 this morning due to increase in oxygen needs.  Pt still taking shallow breaths, and tachypneic (around 24-26 at rest).  BS are clear/diminished throughout.  Pt explained about  Her progression, I answered any questions she has.    Ethan Patel, RT on 10/30/2021 at 7:13 AM

## 2021-10-30 NOTE — PROGRESS NOTES
Infectious Disease Curbside Note  I was called by Dr Darcy De La Cruz on 10/30/2021 at  08:05 am to provide input for Janiya Samuels MRN [id number]. The patient is located at [facility]. The nature of this request for a annie consultation does not permit me to perform a comprehensive review of health care records, patient/family interview, nor an examination of the patient. I obtained limited patient information from the provider on the phone call and a review of chart including vitals, labs, micro, images    Based on only the information I was provided today, I make the following recommendations to the treating provider/team for their review and consideration: For this patient with COVID 19, who is unvaccinated, with   increased oxygen needs, now requiring hi flow oxygen, can give Tocilizumab IV, 8 mg/kg with a maximum dose of 800 mg x 1 dose.    These recommendations are not intended to take the place of the care team's clinical judgement, which should always be utilized to provide the most appropriate care to meet the unique needs of each patient. The recommendations offered were based on the limited scope of information provided as today's date. Should additional guidance be needed or required a formal consultation with infectious diseases is recommended.      Dante Mckeon   of Medicine  Division of Infectious Diseases  [facility]

## 2021-10-30 NOTE — ANESTHESIA PROCEDURE NOTES
Arterial Line Procedure Note    Pre-Procedure   Staff -        Performed By: CRNA       Location: ICU       Pre-Anesthestic Checklist: patient identified, risks and benefits discussed, informed consent, monitors and equipment checked, pre-op evaluation and at physician/surgeon's request  Timeout:       Correct Patient: Yes        Correct Procedure: Yes        Correct Site: Yes        Correct Position: Yes   Procedure   Procedure: arterial line and new line       Laterality: right       Insertion Site: radial.  Sterile Prep        All elements of maximal sterile barrier technique followed       Patient Prep/Sterile Barriers: hand hygiene, sterile gloves, hat, mask, draped, sterile gown, patient draped, draped       Skin prep: Chloraprep  Insertion/Injection        Technique: ultrasound guided and Seldinger Technique        1. Ultrasound was used to evaluate the access site.       2. Artery evaluated via ultrasound for patency/adequacy.       3. Using real-time ultrasound the needle/catheter was observed entering the artery/vein.       5. The visualized structures were anatomically normal.       6. There were no apparent abnormal pathologic findings.       Catheter size: 20 Ga, 15cm.  Narrative         Secured by: suture       Tegaderm and Biopatch dressing used.       Complications: None apparent,        Arterial waveform: Yes        IBP within 10% of NIBP: Yes

## 2021-10-30 NOTE — H&P
[facility]    History and Physical - Hospitalist Service       Date of Admission:  10/29/2021    Assessment & Plan      Janiya Samuels is a 51 year old female admitted on 10/29/2021 with rapidly progressing respiratory failure from COVID-19 pneumonia on approximately day 8-9 of illness.  She has been boarding in the emergency room for the past 21 hours and has gone from needing 6 L nasal cannula oxygen all the way up to requiring BiPAP 14/10 with FiO2 of 75 to 80%.  Patient is being hospitalized in the intensive care unit for ongoing critical care management.    # Confirmed COVID-19 infection    # Acute Hypoxic Respiratory Failure secondary to COVID-19 infection  # Viral Pneumonia secondary to COVID-19 infection    Symptom Onset 10/22/21   Date of 1st Positive Test 10/29/21   Vaccination Status Not vaccinated       - Admit to medical floor with continuous pulse ox, COVID-19 special precautions  - Oxygen: continue current support with Bipap at 14/10 with FiO2 75%; titrate to keep SpO2 between 90-96%.  Given patient's rapid worsening over the past 12 hours we will also have art line placed for frequent monitoring of ABGs and have a PICC line placed for central line access as well as reliable IV access in case patient continues to decompensate going forward.  - Labs: daily COVID labs ordered (CBC, creatinine, retic count, LDH, INR/PT, D-dimer, fibrinogen, troponin, CRP)   - Imaging: no additional imaging needed at this time  - Breathing treatments: no inhalers needed; avoid nebulizers in favor of MDIs   - IV fluids: not indicated at this time  - Antibiotics: not indicated   - COVID-Focused Medications: Dexamethasone 6 mg x 10 days or until hospital discharge, started on 10/29/21, Remdesivir x 5 days or until hospital discharge, started on 10/29/21 and patient has already received 1 dose of Tocilizumab on 10/30/2021 following discussion with infectious disease  - DVT Prophylaxis: at high  risk of thrombotic complications due to COVID-19 (DDimer = 2.96 ug/mL FEU (Ref range: 0.00 - 0.50 ug/mL FEU) ).          - LOW INTENSITY dosing: Lovenox 0.5 mg/kg two times a day        - consider anticoag on discharge for 30 days & until return to normal mobility      Transaminitis    Assessment: Secondary to COVID-19 infection    Plan: We will need to monitor daily and if becomes 5 times above upper limits of normal will need to discontinue remdesivir administration.     Diet: NPO for Medical/Clinical Reasons Except for: No Exceptions  DVT Prophylaxis: Enoxaparin (Lovenox) SQ  Nguyen Catheter: Not present  Central Lines: None - but PICC line will be placed  Code Status: Full Code    Clinically Significant Risk Factors Present on Admission         # Hyponatremia: Na = 130 mmol/L (Ref range: 133 - 144 mmol/L) on admission, will monitor as appropriate           Disposition Plan   Expected discharge:  5-7 days recommended to prior living arrangement once Respiratory failure is improving and safe disposition plan is in place.     The patient's care was discussed with the Patient and Patient's Family.    68 minutes has been spent in management of this critically ill patient so far today.      Simona De La Cruz MD  [facility]  Securely message with the [employer] Web Console (learn more here)  Text page via [employer] Paging/Directory        ______________________________________________________________________    Chief Complaint   1 week history of feeling unwell with fever, chills, cough, headache, dizziness, myalgias with a 2-day 3-day history of rapidly worsening shortness of breath    History is obtained from the patient and the emergency room provider    History of Present Illness   Janiya Samuels is a 51 year old female who had a 1 week history of nonproductive cough, dyspnea, myalgias, dizziness, headache, fatigue, weakness and low-grade fever who presented to the emergency room with  a 2 to 3-day history of worsening shortness of breath.  She was found to be hypoxic and placed on 6 L nasal cannula oxygen with work-up revealing COVID-19 pneumonia but no signs of superimposed bacterial infection.  She was given remdesivir and dexamethasone and attempted to admit to the hospital, however unfortunately there was no hospital bed available anywhere in [place] so patient boarded in the emergency room overnight.  Overnight she did start having more rapid worsening of her respiratory status going from nasal cannula to oxime mask to high flow nasal cannula and now this morning to BiPAP.  Emergency provider talked with infectious disease and got approval for tocilizumab administration which has now been given.  An ICU bed has opened up at this facility and patient will be admitted for ongoing critical care management of her COVID-19 pneumonia and respiratory failure.    Review of Systems    CONSTITUTIONAL: Positive for fever, chills, myalgia, severe fatigue  INTEGUMENTARY/SKIN: NEGATIVE for worrisome rashes, moles or lesions  EYES: NEGATIVE for vision changes or irritation  ENT/MOUTH: NEGATIVE for ear, mouth and throat problems  RESP: Positive for cough for the past week and 2-3 with history of worsening shortness of breath  GI: Positive for very poor appetite but patient denies any nausea, abdominal pain, change in bowel movement  : NEGATIVE for frequency, dysuria, or hematuria  MUSCULOSKELETAL: Diffuse generalized weakness  NEURO: Weakness and intermittent dizziness for the past week, no focal neurological deficit.  Patient does report that it is harder for her to think than it normally would be and she feels somewhat foggy  HEME: NEGATIVE for bleeding problems  PSYCHIATRIC: NEGATIVE for changes in mood or affect    Past Medical History    I have reviewed this patient's medical history and updated it with pertinent information if needed.   History reviewed. No pertinent past medical history.    Past  Surgical History   I have reviewed this patient's surgical history and updated it with pertinent information if needed.  Past Surgical History:   Procedure Laterality Date      SECTION      5th child     LAPAROSCOPIC CHOLECYSTECTOMY N/A 2017    Procedure: LAPAROSCOPIC CHOLECYSTECTOMY;  Laparoscopic Cholecystectomy;  Surgeon: Cordell Cisneros MD;  Location:  OR       Social History   I have reviewed this patient's social history and updated it with pertinent information if needed.  Social History     Tobacco Use     Smoking status: Never Smoker     Smokeless tobacco: Never Used   Substance Use Topics     Alcohol use: Yes     Comment: occ     Drug use: Not Currently       Family History   I have reviewed this patient's family history and updated it with pertinent information if needed.  Family History   Problem Relation Age of Onset     Other Cancer Mother      Diabetes Mother      Hypertension Mother      Hypertension Father        Prior to Admission Medications   Prior to Admission Medications   Prescriptions Last Dose Informant Patient Reported? Taking?   Cholecalciferol (VITAMIN D3 PO) 10/29/2021 at am Self Yes Yes   Sig: Take 1 tablet by mouth daily Unknown strength   IBUPROFEN PO Unknown at Unknown time Self Yes Yes   Sig: Take 600 mg by mouth   Multiple Vitamins-Minerals (ZINC PO) 10/29/2021 at am Self Yes Yes   Sig: Take 1 tablet by mouth daily Unknown strength   calcium carbonate (TUMS) 500 MG chewable tablet Unknown at Unknown time Self Yes Yes   Sig: Take 1 chew tab by mouth daily      Facility-Administered Medications: None     Allergies   Allergies   Allergen Reactions     Erythromycin        Physical Exam   Vital Signs: Temp: 98.6  F (37  C) Temp src: Oral BP: 120/85 Pulse: 68   Resp: 19 SpO2: 97 % O2 Device: BiPAP/CPAP   Weight: 195 lbs 0 oz    Constitutional: Patient is awake, alert, BiPAP is in place and patient is cooperative with exam and answering questions appropriately but  slowly.  Eyes: Pupils equal round and reactive to light, extraocular muscles intact  ENT: Normocephalic, without obvious abnormality, atraumatic, frontal sinuses nontender on palpation, external ears without lesions, oropharynx and nares were not visualized due to ongoing need of BiPAP  Respiratory: Patient has ongoing mild tachypnea in the low 20s even while on BiPAP but no other signs of increased work of breathing.  She is able to talk in full sentences.  Decreased breath sounds diffusely but without wheezes or crackles  Cardiovascular: Regular rate and rhythm without murmur  GI: Bowel sounds present, abdomen is soft and nondistended, no tenderness on palpation  Skin: no redness, warmth, or swelling and no rashes  Musculoskeletal: no lower extremity pitting edema present  Neurologic: Awake, alert, oriented to name, place and time and situation     Data   Data reviewed today: I reviewed all medications, new labs and imaging results over the last 24 hours.    4.4    \    13.6    /    288   N N/A    L N/A    138    109    15 /   ------------------------------------ 170 (H)   ALT 65 (H)   AST 73 (H)   AP 55   ALB 2.1 (L)   Ca 8.0 (L)  4.2    26    0.42 (L) \    % RETIC N/A     (H)  Troponin <0.015    BNP N/A    CK N/A  INR 1.07   PTT 29    D-dimer 2.96 (H)    Fibrinogen 642 (H)    Antithrombin N/A  Ferritin N/A  .0 (H)    IL-6 N/A

## 2021-10-30 NOTE — ANESTHESIA POSTPROCEDURE EVALUATION
Patient: Janiya Samuels    Procedure: * No procedures listed *       Diagnosis:* No pre-op diagnosis entered *  Diagnosis Additional Information: No value filed.    Anesthesia Type:  No value filed.    Note:  Disposition: ICU            ICU Sign Out: Anesthesiologist/ICU physician sign out WAS performed   Postop Pain Control: Uneventful            Sign Out: Well controlled pain   PONV: No   Neuro/Psych: Uneventful            Sign Out: Acceptable/Baseline neuro status   Airway/Respiratory: Uneventful            Sign Out: Acceptable/Baseline resp. status   CV/Hemodynamics: Uneventful            Sign Out: Acceptable CV status   Other NRE: NONE   DID A NON-ROUTINE EVENT OCCUR? No    Event details/Postop Comments:  Pt was happy with anesthesia care.  No complications.  I will follow up with the pt if needed.           Last vitals:  Vitals Value Taken Time   BP     Temp     Pulse 70 10/30/21 1457   Resp 34 10/30/21 1457   SpO2 95 % 10/30/21 1457   Vitals shown include unvalidated device data.    Electronically Signed By: MALA Mccullough CRNA  October 30, 2021  2:58 PM

## 2021-10-30 NOTE — PROGRESS NOTES
S: Patient arrives to room 217 via cart from ED    B: +COVID    A: Short of breath with activity, lungs clear. BiPAP on, FiO2 65%, O2 sats 96%. Alert and oriented, skin warm and dry.     R: Orders reviewed with patient. Will monitor patient per MD orders.     Inpatient nursing criteria listed below were met:    Health care directives status obtained and documented: Yes  SCD's Documented: Yes  Skin issues/needs documented:Yes  Isolation addressed and Signage used: Yes  Fall Prevention: Care plan updated Yes Education given and documented Yes  Care Plan initiated and Co-Morbidities added: Yes  Education Assessment documented:Yes  Admission Education Documented: Yes  If present CAUTI/CLABI Education done: NA  New medication patient education completed and documented (Possible Side Effects of Common Medications handout): Yes  Allergies Reviewed: Yes  Admission Medication Reconciliation completed: Yes  Home medications if not able to send immediately home with family stored here: NA  Reminder note placed in discharge instructions regarding home meds: NA  Individualized care needs/preferences addressed and charted: Yes

## 2021-10-30 NOTE — PROGRESS NOTES
Pt placed on BiPAP due to increasing oxygen needs after intiation of HHFNC. Currently on 14/10 cmH2O rate of 12 and FiO2 90%.  RR 30, vt 432, mve 10, Hr 64, sat's 93%, Bp 108/76 mmHg.  Pt did require 1mg of ativan to help tolerate BiPAP.  On high flow I couldn't go above a flow rate of 35 lpm with out patient panicking about the flow.    Ethan Patel, RT on 10/30/2021 at 8:03 AM

## 2021-10-30 NOTE — ED PROVIDER NOTES
Patient was signed out to me at change of shift by Nawaf Khoury.  She is being followed closely by RT.  She has Covid is requiring supplemental oxygen.  She has received Decadron, remdesivir, some IV fluids because of dehydration, IV Toradol and oral Tylenol.  She is boarding in the ED because there are no beds available here or anywhere in the [facility].    RT has been following her closely.  Her O2 requirements have been going up.  Dr. De La Cruz assumed her care at change of shift.  I did order her daily Decadron doses.     Nasim Mcneil MD  10/30/21 0719

## 2021-10-30 NOTE — ANESTHESIA PREPROCEDURE EVALUATION
Anesthesia Pre-Procedure Evaluation    Patient: Janiya Samuels   MRN: [id number] : 1970        Preoperative Diagnosis: * No surgery found *    Procedure :           History reviewed. No pertinent past medical history.   Past Surgical History:   Procedure Laterality Date      SECTION      5th child     LAPAROSCOPIC CHOLECYSTECTOMY N/A 2017    Procedure: LAPAROSCOPIC CHOLECYSTECTOMY;  Laparoscopic Cholecystectomy;  Surgeon: Cordell Cisneros MD;  Location: [facility] OR      Allergies   Allergen Reactions     Erythromycin       Social History     Tobacco Use     Smoking status: Never Smoker     Smokeless tobacco: Never Used   Substance Use Topics     Alcohol use: Yes     Comment: occ      Wt Readings from Last 1 Encounters:   10/30/21 90.3 kg (199 lb)              OUTSIDE LABS:  CBC:   Lab Results   Component Value Date    WBC 4.4 10/30/2021    WBC 3.8 (L) 10/29/2021    HGB 13.6 10/30/2021    HGB 13.9 10/29/2021    HCT 40.3 10/30/2021    HCT 40.4 10/29/2021     10/30/2021     10/29/2021     BMP:   Lab Results   Component Value Date     10/30/2021     (L) 10/29/2021    POTASSIUM 4.2 10/30/2021    POTASSIUM 3.8 10/29/2021    CHLORIDE 109 10/30/2021    CHLORIDE 98 10/29/2021    CO2 26 10/30/2021    CO2 27 10/29/2021    BUN 15 10/30/2021    BUN 12 10/29/2021    CR 0.42 (L) 10/30/2021    CR 0.69 10/29/2021     (H) 10/30/2021     (H) 10/29/2021     COAGS:   Lab Results   Component Value Date    PTT 29 10/30/2021    INR 1.07 10/30/2021    FIBR 642 (H) 10/30/2021     POC:   Lab Results   Component Value Date    HCG Negative 2017     HEPATIC:   Lab Results   Component Value Date    ALBUMIN 2.1 (L) 10/30/2021    PROTTOTAL 6.4 (L) 10/30/2021    ALT 65 (H) 10/30/2021    AST 73 (H) 10/30/2021    ALKPHOS 55 10/30/2021    BILITOTAL 0.2 10/30/2021     OTHER:   Lab Results   Component Value Date    PH 7.46 (H) 10/30/2021    A1C 6.2 (H) 10/30/2021    HI 8.0 (L) 10/30/2021     LIPASE 214 06/08/2017    .0 (H) 10/30/2021       Anesthesia Plan    ASA Status:  3, emergent      - Procedure: Procedure only, no anesthetic delivered                    Consents            Postoperative Care            Comments:                MALA Mccullough CRNA

## 2021-10-30 NOTE — ANESTHESIA CARE TRANSFER NOTE
Patient: Janiya Samuels    Procedure: * No procedures listed *       Diagnosis: * No pre-op diagnosis entered *  Diagnosis Additional Information: No value filed.    Anesthesia Type:   No value filed.     Note:    Oropharynx: oropharynx clear of all foreign objects and spontaneously breathing  Level of Consciousness: awake      Independent Airway: airway patency satisfactory and stable  Dentition: dentition unchanged  Vital Signs Stable: post-procedure vital signs reviewed and stable  Report to RN Given: handoff report given  Patient transferred to: ICU  Comments: Biopatch was dropped during placement.  RN was notified that dressing was applied without.    ICU Handoff: Call for PAUSE to initiate/utilize ICU HANDOFF, Identified Patient, Identified Responsible Provider, Reviewed the Pertinent Medical History, Discussed Surgical Course, Reviewed Intra-OP Anesthesia Management and Issues during Anesthesia, Set Expectations for Post Procedure Period and Allowed Opportunity for Questions and Acknowledgement of Understanding      Vitals:  Vitals Value Taken Time   BP     Temp     Pulse 75 10/30/21 1456   Resp 23 10/30/21 1456   SpO2 95 % 10/30/21 1456   Vitals shown include unvalidated device data.    Electronically Signed By: MALA Mccullough CRNA  October 30, 2021  2:57 PM

## 2021-10-30 NOTE — NURSING NOTE
PICC PLACEMENT: Acute Access    PICC placement, risks + benefits reviewed with patient, all questions answered. Pt signed consent.    Pre-Assessment: No contraindications noted.    Placement:   A 5 Fr Double Lumen Power picc was placed in the pt's RUE, basilic vessel, x 1 attempt. Vessel Diameter: 6.4mm.  PICC total Length: 41cm, External Length: 2cm. No complications encountered during placement. + Blood return, + flush. Pt tolerated well. PICC secured with [id number], a biopatch and transparent dressing applied. Next Scheduled dressing change due in 7 days: 11/6/2021.    Post Insertion CXR: PIcc tip in low svc, picc ok to use. Primary Rn updated.    Study Result    Narrative & Impression   EXAM: XR CHEST PORT 1 VIEW  LOCATION: [facility]  DATE/TIME: 10/30/2021 3:44 PM     INDICATION: PICC PLACEMENT-TIP VERIFICATION  COMPARISON: Chest CT from 10/29/2021                                                                      IMPRESSION: Right upper extremity PICC terminates over the distal SVC. Persistent patchy multifocal pulmonary opacities indicative of COVID pneumonia. No effusions or pneumothorax. Heart size is stable.

## 2021-10-30 NOTE — ED NOTES
Provider and primary RN made aware of consistent O2 readings of 87-91% on 10 LPM oxymask. RT called and will come reassess.

## 2021-10-30 NOTE — ED NOTES
Watching sats drop below 90% for past 5-7 minutes.  I went in and elevated HOB.  Oxygen was on patient and she was sleeping.  Alert and oriented.

## 2021-10-31 LAB
ALBUMIN SERPL-MCNC: 2.1 G/DL (ref 3.4–5)
ALP SERPL-CCNC: 50 U/L (ref 40–150)
ALT SERPL W P-5'-P-CCNC: 56 U/L (ref 0–50)
ANION GAP SERPL CALCULATED.3IONS-SCNC: 4 MMOL/L (ref 3–14)
AST SERPL W P-5'-P-CCNC: 53 U/L (ref 0–45)
BASE EXCESS BLDA CALC-SCNC: 2.2 MMOL/L (ref -9–1.8)
BASE EXCESS BLDA CALC-SCNC: 2.9 MMOL/L (ref -9–1.8)
BILIRUB DIRECT SERPL-MCNC: 0.1 MG/DL (ref 0–0.2)
BILIRUB SERPL-MCNC: 0.3 MG/DL (ref 0.2–1.3)
BUN SERPL-MCNC: 18 MG/DL (ref 7–30)
CALCIUM SERPL-MCNC: 7.9 MG/DL (ref 8.5–10.1)
CHLORIDE BLD-SCNC: 110 MMOL/L (ref 94–109)
CO2 SERPL-SCNC: 25 MMOL/L (ref 20–32)
CREAT SERPL-MCNC: 0.41 MG/DL (ref 0.52–1.04)
CRP SERPL-MCNC: 73.8 MG/L (ref 0–8)
D DIMER PPP FEU-MCNC: 1.99 UG/ML FEU (ref 0–0.5)
ERYTHROCYTE [DISTWIDTH] IN BLOOD BY AUTOMATED COUNT: 14.3 % (ref 10–15)
FIBRINOGEN PPP-MCNC: 549 MG/DL (ref 170–490)
GFR SERPL CREATININE-BSD FRML MDRD: >90 ML/MIN/1.73M2
GLUCOSE BLD-MCNC: 177 MG/DL (ref 70–99)
GLUCOSE BLDC GLUCOMTR-MCNC: 161 MG/DL (ref 70–99)
GLUCOSE BLDC GLUCOMTR-MCNC: 165 MG/DL (ref 70–99)
GLUCOSE BLDC GLUCOMTR-MCNC: 183 MG/DL (ref 70–99)
GLUCOSE BLDC GLUCOMTR-MCNC: 194 MG/DL (ref 70–99)
GLUCOSE BLDC GLUCOMTR-MCNC: 224 MG/DL (ref 70–99)
HCO3 BLD-SCNC: 26 MMOL/L (ref 21–28)
HCO3 BLD-SCNC: 26 MMOL/L (ref 21–28)
HCT VFR BLD AUTO: 38.6 % (ref 35–47)
HGB BLD-MCNC: 12.8 G/DL (ref 11.7–15.7)
INR PPP: 1.1 (ref 0.85–1.15)
LDH SERPL L TO P-CCNC: 432 U/L (ref 81–234)
MCH RBC QN AUTO: 29.2 PG (ref 26.5–33)
MCHC RBC AUTO-ENTMCNC: 33.2 G/DL (ref 31.5–36.5)
MCV RBC AUTO: 88 FL (ref 78–100)
O2/TOTAL GAS SETTING VFR VENT: 100 %
O2/TOTAL GAS SETTING VFR VENT: 100 %
OXYHGB MFR BLD: 93 % (ref 92–100)
PCO2 BLD: 35 MM HG (ref 35–45)
PCO2 BLD: 36 MM HG (ref 35–45)
PH BLD: 7.46 [PH] (ref 7.35–7.45)
PH BLD: 7.48 [PH] (ref 7.35–7.45)
PLATELET # BLD AUTO: 379 10E3/UL (ref 150–450)
PO2 BLD: 108 MM HG (ref 80–105)
PO2 BLD: 64 MM HG (ref 80–105)
POTASSIUM BLD-SCNC: 4.2 MMOL/L (ref 3.4–5.3)
PROT SERPL-MCNC: 6.1 G/DL (ref 6.8–8.8)
RBC # BLD AUTO: 4.39 10E6/UL (ref 3.8–5.2)
SODIUM SERPL-SCNC: 139 MMOL/L (ref 133–144)
TROPONIN I SERPL-MCNC: 0.02 UG/L (ref 0–0.04)
WBC # BLD AUTO: 3.7 10E3/UL (ref 4–11)

## 2021-10-31 PROCEDURE — 85379 FIBRIN DEGRADATION QUANT: CPT | Performed by: FAMILY MEDICINE

## 2021-10-31 PROCEDURE — 85610 PROTHROMBIN TIME: CPT | Performed by: FAMILY MEDICINE

## 2021-10-31 PROCEDURE — 200N000001 HC R&B ICU

## 2021-10-31 PROCEDURE — 250N000013 HC RX MED GY IP 250 OP 250 PS 637: Performed by: FAMILY MEDICINE

## 2021-10-31 PROCEDURE — 99291 CRITICAL CARE FIRST HOUR: CPT | Performed by: FAMILY MEDICINE

## 2021-10-31 PROCEDURE — 83615 LACTATE (LD) (LDH) ENZYME: CPT | Performed by: FAMILY MEDICINE

## 2021-10-31 PROCEDURE — 258N000003 HC RX IP 258 OP 636: Performed by: FAMILY MEDICINE

## 2021-10-31 PROCEDURE — 85027 COMPLETE CBC AUTOMATED: CPT | Performed by: FAMILY MEDICINE

## 2021-10-31 PROCEDURE — 36600 WITHDRAWAL OF ARTERIAL BLOOD: CPT

## 2021-10-31 PROCEDURE — 82803 BLOOD GASES ANY COMBINATION: CPT | Performed by: FAMILY MEDICINE

## 2021-10-31 PROCEDURE — 250N000011 HC RX IP 250 OP 636: Performed by: FAMILY MEDICINE

## 2021-10-31 PROCEDURE — 85384 FIBRINOGEN ACTIVITY: CPT | Performed by: FAMILY MEDICINE

## 2021-10-31 PROCEDURE — 80048 BASIC METABOLIC PNL TOTAL CA: CPT | Performed by: FAMILY MEDICINE

## 2021-10-31 PROCEDURE — 82248 BILIRUBIN DIRECT: CPT | Performed by: FAMILY MEDICINE

## 2021-10-31 PROCEDURE — 36592 COLLECT BLOOD FROM PICC: CPT | Performed by: FAMILY MEDICINE

## 2021-10-31 PROCEDURE — 82805 BLOOD GASES W/O2 SATURATION: CPT | Performed by: FAMILY MEDICINE

## 2021-10-31 PROCEDURE — 84484 ASSAY OF TROPONIN QUANT: CPT | Performed by: FAMILY MEDICINE

## 2021-10-31 PROCEDURE — 86140 C-REACTIVE PROTEIN: CPT | Performed by: FAMILY MEDICINE

## 2021-10-31 PROCEDURE — 250N000009 HC RX 250: Performed by: FAMILY MEDICINE

## 2021-10-31 RX ORDER — ZINC SULFATE 50(220)MG
220 CAPSULE ORAL DAILY
Status: DISCONTINUED | OUTPATIENT
Start: 2021-10-31 | End: 2021-11-10 | Stop reason: HOSPADM

## 2021-10-31 RX ORDER — ASCORBIC ACID 500 MG
1000 TABLET ORAL DAILY
Status: DISCONTINUED | OUTPATIENT
Start: 2021-10-31 | End: 2021-11-10 | Stop reason: HOSPADM

## 2021-10-31 RX ADMIN — ENOXAPARIN SODIUM 40 MG: 40 INJECTION SUBCUTANEOUS at 20:26

## 2021-10-31 RX ADMIN — ENOXAPARIN SODIUM 40 MG: 40 INJECTION SUBCUTANEOUS at 08:25

## 2021-10-31 RX ADMIN — LORAZEPAM 0.5 MG: 2 INJECTION INTRAMUSCULAR; INTRAVENOUS at 08:24

## 2021-10-31 RX ADMIN — OXYCODONE HYDROCHLORIDE AND ACETAMINOPHEN 1000 MG: 500 TABLET ORAL at 12:35

## 2021-10-31 RX ADMIN — INSULIN ASPART 1 UNITS: 100 INJECTION, SOLUTION INTRAVENOUS; SUBCUTANEOUS at 06:42

## 2021-10-31 RX ADMIN — DEXAMETHASONE SODIUM PHOSPHATE 6 MG: 10 INJECTION INTRAMUSCULAR; INTRAVENOUS at 08:24

## 2021-10-31 RX ADMIN — SODIUM CHLORIDE 50 ML: 9 INJECTION, SOLUTION INTRAVENOUS at 17:57

## 2021-10-31 RX ADMIN — Medication 125 MCG: at 12:35

## 2021-10-31 RX ADMIN — REMDESIVIR 100 MG: 100 INJECTION, POWDER, LYOPHILIZED, FOR SOLUTION INTRAVENOUS at 16:32

## 2021-10-31 RX ADMIN — Medication 220 MG: at 12:36

## 2021-10-31 RX ADMIN — INSULIN ASPART 1 UNITS: 100 INJECTION, SOLUTION INTRAVENOUS; SUBCUTANEOUS at 02:51

## 2021-10-31 ASSESSMENT — ACTIVITIES OF DAILY LIVING (ADL)
ADLS_ACUITY_SCORE: 5
ADLS_ACUITY_SCORE: 3
ADLS_ACUITY_SCORE: 5
ADLS_ACUITY_SCORE: 3
ADLS_ACUITY_SCORE: 5
ADLS_ACUITY_SCORE: 3

## 2021-10-31 NOTE — PROGRESS NOTES
Pt has been on HHFNC at 40 lpm and 80% this evening.  Pt had required 50 lpm and 100% for 90% of the day today.  Pt IS to wear BiPAP tonight at 14/10 cmH2O and FiO2 to keep sats 90% or higher.  Pt has no complaints today.  Bs are diminished on RUL, crackles RLL, LLL, and clear/diminished on DION.  Pt only able to obtain about 500 ml on IS today.    Ethan Patel, RT on 10/31/2021 at 5:35 PM

## 2021-10-31 NOTE — PROGRESS NOTES
[facility]    Medicine Progress Note - Hospitalist Service       Date of Admission:  10/29/2021    Assessment & Plan           Janiya Samuels is a 51 year old female admitted on 10/29/2021 with rapidly progressing respiratory failure from COVID-19 pneumonia on approximately day 8-9 of illness.  She developed rapid worsening within the first 24 hours increased from 6 L nasal cannula oxygen all the way up to requiring BiPAP 14/10 with FiO2 of 75 to 80% and ICU management.  She has been started on Remdesivir and dexamethasone and has received Actemra x1.  Overnight she had improvement on Bipap with FiO2 being able to wean down to 40% with saturations still in the mid 90s.  RT has transitioned patient to HFNC this morning at 30L and 100% this morning and so far is tolerating it well respiratorily.  Will allow patient to eat if she continues to have respiratory stability.  Transition between Bipap and HFNC today as needed for improved respiratory support.      # Confirmed COVID-19 infection    # Acute Hypoxic Respiratory Failure secondary to COVID-19 infection  # Viral Pneumonia secondary to COVID-19 infection    Symptom Onset 10/22/21   Date of 1st Positive Test 10/29/21   Vaccination Status Not vaccinated       - Continue ICU management.    - Oxygen: will attempt transition to HFNC this morning and monitor closely for tolerance, returning to Bipap as needed for signs of respiratory fatigue or hypoxia.    - Labs: daily COVID labs ordered (CBC, creatinine, retic count, LDH, INR/PT, D-dimer, fibrinogen, troponin, CRP)   - Imaging: no additional imaging needed at this time  - Breathing treatments: no inhalers needed; avoid nebulizers in favor of MDIs   - IV fluids: not indicated at this time  - Antibiotics: not indicated   - COVID-Focused Medications: Dexamethasone 6 mg x 10 days (today is day 3 of 10), Remdesivir x 5 days (today is day 3 of 5).  Patient has already received 1 dose of  Tocilizumab on 10/30/2021 following discussion with infectious disease.  Family has asked about ivermectin and they are accepting of the fact that this is not something that is FDA approved nor is it in option at our facility.  They are requesting vitamin C, vitamin D, and zinc be initiated and this well occur.  They are wondering about the benefit of inhaled budesonide and discussed that with the dexamethasone there is been no evidence of an additional benefit of inhaled steroid.  - DVT Prophylaxis: at high risk of thrombotic complications due to COVID-19 (DDimer = 2.96 ug/mL FEU (Ref range: 0.00 - 0.50 ug/mL FEU) ).          - LOW INTENSITY dosing: Lovenox 0.5 mg/kg two times a day        - consider anticoag on discharge for 30 days & until return to normal mobility      Transaminitis    Assessment: Secondary to COVID-19 infection, repeat testing today shows slight improvement of both AST and ALT    Plan: We will need to monitor daily and if becomes 5 times above upper limits of normal will need to discontinue remdesivir administration.      Prediabetes    Assessment: Hemoglobin A1c of 6.2, previously unknown    Plan: We will continue every 4 hour blood sugar monitoring with sliding scale insulin as appropriate.  Inform patient of the diagnosis and that significant hyperglycemia may occur secondary to dexamethasone administration.  If patient is able to start having oral intake, will transition to premeal blood sugar monitoring and sliding scale insulin.  Would also at that time start a moderate carbohydrate diet.  Education going forward regarding carb consistent diet and prediabetes will need to occur.       Diet: NPO for Medical/Clinical Reasons Except for: No Exceptions    DVT Prophylaxis: Enoxaparin (Lovenox) SQ  Nguyen Catheter: Not present  Central Lines: PRESENT  PICC Double Lumen 10/30/21 Right Basilic-Site Assessment: WDL  Code Status: Full Code      Disposition Plan   Expected discharge:  4-6 days  recommended to prior living arrangement once respiratory failure is improving, patient medically stable for discharge.     The patient's care was discussed with the Bedside Nurse, Care Coordinator/, Patient and Patient's Family.    41 minutes has been spent in critical care management of this patient so far today.      Simona De La Cruz MD  Hospitalist Service  [facility]  Securely message with the Vocera Web Console (learn more here)  Text page via [employer] Paging/Directory        Clinically Significant Risk Factors Present on Admission                   ______________________________________________________________________    Interval History   Patient did very well overnight with blood pressure and pulse stable, afebrile.  Was able to wean down on oxygen supplementation on Bipap from 80% at time of admission to 40% most of the night with excellent tolerance.  Patient has not voided in the past 8 hours - did have 800 of urine output in the 8 hours prior to that.  Has not yet gotten up to void this morning but does feel the urge.  Patient reports feeling improved air movement and energy compared to yesterday but still feels weak.  She denies any new symptoms.  No additional new nursing concerns.    Data reviewed today: I reviewed all medications, new labs and imaging results over the last 24 hours.    Physical Exam   Vital Signs: Temp: 98.3  F (36.8  C) Temp src: Oral BP: 117/78 Pulse: 60   Resp: 30 SpO2: 96 % O2 Device: High Flow Nasal Cannula (HFNC) Oxygen Delivery: 30 LPM  Weight: 199 lbs 0 oz  Constitutional: awake, alert, cooperative, no apparent distress apart from tachypnea on high flow nasal cannula, and appears stated age  Respiratory: Patient does have tachypnea in the mid 20 range currently but is able to talk in full sentences and does not appear to have any other accessory muscle use or signs of worsening distress.  She has ongoing diminished air  movement but it is slightly improved compared to yesterday.  No wheezes or crackles at this time.  Cardiovascular: Regular rate and rhythm without murmur  GI: Bowel sounds present, abdomen is soft, nondistended, nontender  Skin: no redness, warmth, or swelling and no rashes  Musculoskeletal: no lower extremity pitting edema present  Neurologic: Awake, alert, oriented to name, place and time.      Data   Recent Labs   Lab 10/31/21  0528 10/31/21  0248 10/30/21  2130 10/30/21  1447 10/30/21  1149 10/30/21  1147 10/30/21  1146 10/29/21  1953 10/29/21  1254 10/29/21  1254   WBC  --   --   --   --   --  4.4  --   --   --  3.8*   HGB  --   --   --   --   --  13.6  --   --   --  13.9   MCV  --   --   --   --   --  87  --   --   --  86   PLT  --   --   --   --   --  288  --   --   --  213   INR 1.10  --   --   --   --   --  1.07  --   --   --      --   --   --  138  --   --   --   --  130*   POTASSIUM 4.2  --   --   --  4.2  --   --   --   --  3.8   CHLORIDE 110*  --   --   --  109  --   --   --   --  98   CO2 25  --   --   --  26  --   --   --   --  27   BUN 18  --   --   --  15  --   --   --   --  12   CR 0.41*  --   --   --  0.42*  --   --   --   --  0.69   ANIONGAP 4  --   --   --  3  --   --   --   --  5   HI 7.9*  --   --   --  8.0*  --   --   --   --  8.0*   * 161* 165*   < > 158*  --   --    < >   < > 116*   ALBUMIN  --   --   --   --  2.1*  --   --   --   --  2.4*   PROTTOTAL  --   --   --   --  6.4*  --   --   --   --  6.9   BILITOTAL  --   --   --   --  0.2  --   --   --   --  0.3   ALKPHOS  --   --   --   --  55  --   --   --   --  56   ALT  --   --   --   --  65*  --   --   --   --  77*   AST  --   --   --   --  73*  --   --   --   --  105*   TROPONIN 0.016  --   --   --  <0.015  --   --   --   --  <0.015    < > = values in this interval not displayed.

## 2021-10-31 NOTE — ED PROVIDER NOTES
Signout received at change of shift from Dr. Fab Becker.  He had assumed care from Nikhil Khoury PA-C over previous shift.  See their notes for patient presenting details, initial work-up, and overnight monitoring.    Briefly, this is a 51-year-old female with no significant past medical history who had symptoms for the last 7 days, is found to be Covid positive here in the emergency department and is on high flow oxygen.  She is needing admission.  RT had assessed her this morning.  Recommended consultation with infectious disease for Actemra dosing.  Did contact infectious disease specialist on call and after reviewing case, labs, patient presentation, and other important details, determined the patient is a candidate for receiving Actemra.  She has already received Decadron and remdesivir in the emergency room.  Still awaiting patient placement.    Patient did progress from needing high flow oxygen to the point of needing BiPAP.  She was reevaluated after being placed on BiPAP and does seem like she is tolerating this well and breathing easily with this.  Saturations are maintained.  Due to facility discharges, an inpatient bed did become available at this facility in the ICU, and patient will be admitted locally.  Spoke with Dr. Sabina De La Cruz who graciously excepted the patient for admission to the ICU.  Will see the patient in the ED and write orders.     Darcy De La Cruz,   10/30/21 [id number]

## 2021-10-31 NOTE — PLAN OF CARE
Problem: Adult Inpatient Plan of Care  Goal: Plan of Care Review  Outcome: Improving  Goal: Patient-Specific Goal (Individualized)  Outcome: Improving  Goal: Absence of Hospital-Acquired Illness or Injury  Outcome: Improving  Intervention: Identify and Manage Fall Risk  Recent Flowsheet Documentation  Taken 10/31/2021 0400 by Amrit Moore RN  Safety Promotion/Fall Prevention:   activity supervised   fall prevention program maintained   increased rounding and observation   lighting adjusted   nonskid shoes/slippers when out of bed   room near nurse's station   room organization consistent   safety round/check completed   supervised activity  Taken 10/31/2021 0000 by Amrit Moore RN  Safety Promotion/Fall Prevention:   activity supervised   fall prevention program maintained   increased rounding and observation   lighting adjusted   nonskid shoes/slippers when out of bed   room near nurse's station   room organization consistent   safety round/check completed   supervised activity  Taken 10/30/2021 2000 by Amrit Moore RN  Safety Promotion/Fall Prevention:   activity supervised   fall prevention program maintained   increased rounding and observation   lighting adjusted   nonskid shoes/slippers when out of bed   room near nurse's station   room organization consistent   safety round/check completed   supervised activity  Intervention: Prevent Skin Injury  Recent Flowsheet Documentation  Taken 10/31/2021 0400 by Amrit Moore RN  Body Position: position changed independently  Taken 10/31/2021 0000 by Amrit Moore RN  Body Position: position changed independently  Taken 10/30/2021 2000 by Amrit Moore RN  Body Position: position changed independently  Intervention: Prevent and Manage VTE (Venous Thromboembolism) Risk  Recent Flowsheet Documentation  Taken 10/31/2021 0400 by Amrit Moore RN  VTE Prevention/Management: anticoagulant therapy maintained  Taken 10/31/2021 0000 by Amrit Moore RN  VTE  Prevention/Management: anticoagulant therapy maintained  Taken 10/30/2021 2000 by Amrit Moore RN  VTE Prevention/Management: anticoagulant therapy maintained  Goal: Optimal Comfort and Wellbeing  Outcome: Improving  Goal: Readiness for Transition of Care  Outcome: Improving     Problem: Gas Exchange Impaired  Goal: Optimal Gas Exchange  Outcome: Improving  Intervention: Optimize Oxygenation and Ventilation  Recent Flowsheet Documentation  Taken 10/31/2021 0400 by Amrit Moore RN  Head of Bed (HOB) Positioning: HOB at 20-30 degrees  Taken 10/31/2021 0000 by Amrit Moore RN  Head of Bed (HOB) Positioning: HOB at 20-30 degrees  Taken 10/30/2021 2000 by Amrit Moore RN  Head of Bed (HOB) Positioning: HOB at 20-30 degrees     Pt remained on Bipap throughout the night at 40 percent Fio2 and maintained oxygen saturations in the mid 90's.   No cough, no complaints of pain and or any other complaints.   Lungs are diminished throughout.   Telemetry has been normal sinus rhythm to sinus adis.   Vitals otherwise stable and Pt remains afebrile.   Blood sugars elevated but stable and treated.   No void this shift and Pt denies sensation to void at this time.   Will continue to monitor respiratory status and oxygen needs, and follow plan of care.

## 2021-11-01 LAB
ALBUMIN SERPL-MCNC: 2.2 G/DL (ref 3.4–5)
ALBUMIN UR-MCNC: NEGATIVE MG/DL
ALP SERPL-CCNC: 53 U/L (ref 40–150)
ALT SERPL W P-5'-P-CCNC: 54 U/L (ref 0–50)
ANION GAP SERPL CALCULATED.3IONS-SCNC: 4 MMOL/L (ref 3–14)
APPEARANCE UR: ABNORMAL
AST SERPL W P-5'-P-CCNC: 37 U/L (ref 0–45)
BACTERIA #/AREA URNS HPF: ABNORMAL /HPF
BILIRUB SERPL-MCNC: 0.4 MG/DL (ref 0.2–1.3)
BILIRUB UR QL STRIP: NEGATIVE
BUN SERPL-MCNC: 16 MG/DL (ref 7–30)
CALCIUM SERPL-MCNC: 7.7 MG/DL (ref 8.5–10.1)
CHLORIDE BLD-SCNC: 108 MMOL/L (ref 94–109)
CO2 SERPL-SCNC: 26 MMOL/L (ref 20–32)
COLOR UR AUTO: YELLOW
CREAT SERPL-MCNC: 0.42 MG/DL (ref 0.52–1.04)
CRP SERPL-MCNC: 43.7 MG/L (ref 0–8)
D DIMER PPP FEU-MCNC: 1.68 UG/ML FEU (ref 0–0.5)
ERYTHROCYTE [DISTWIDTH] IN BLOOD BY AUTOMATED COUNT: 14.3 % (ref 10–15)
FIBRINOGEN PPP-MCNC: 459 MG/DL (ref 170–490)
GFR SERPL CREATININE-BSD FRML MDRD: >90 ML/MIN/1.73M2
GLUCOSE BLD-MCNC: 174 MG/DL (ref 70–99)
GLUCOSE BLDC GLUCOMTR-MCNC: 133 MG/DL (ref 70–99)
GLUCOSE BLDC GLUCOMTR-MCNC: 170 MG/DL (ref 70–99)
GLUCOSE BLDC GLUCOMTR-MCNC: 175 MG/DL (ref 70–99)
GLUCOSE BLDC GLUCOMTR-MCNC: 201 MG/DL (ref 70–99)
GLUCOSE UR STRIP-MCNC: 50 MG/DL
HCT VFR BLD AUTO: 37.9 % (ref 35–47)
HGB BLD-MCNC: 12.8 G/DL (ref 11.7–15.7)
HGB UR QL STRIP: ABNORMAL
INR PPP: 1.15 (ref 0.85–1.15)
KETONES UR STRIP-MCNC: NEGATIVE MG/DL
LDH SERPL L TO P-CCNC: 398 U/L (ref 81–234)
LEUKOCYTE ESTERASE UR QL STRIP: NEGATIVE
MCH RBC QN AUTO: 29.5 PG (ref 26.5–33)
MCHC RBC AUTO-ENTMCNC: 33.8 G/DL (ref 31.5–36.5)
MCV RBC AUTO: 87 FL (ref 78–100)
MUCOUS THREADS #/AREA URNS LPF: PRESENT /LPF
NITRATE UR QL: NEGATIVE
PH UR STRIP: 6 [PH] (ref 5–7)
PLATELET # BLD AUTO: 424 10E3/UL (ref 150–450)
POTASSIUM BLD-SCNC: 4.1 MMOL/L (ref 3.4–5.3)
PROT SERPL-MCNC: 6.1 G/DL (ref 6.8–8.8)
RBC # BLD AUTO: 4.34 10E6/UL (ref 3.8–5.2)
RBC URINE: 95 /HPF
SODIUM SERPL-SCNC: 138 MMOL/L (ref 133–144)
SP GR UR STRIP: 1.02 (ref 1–1.03)
SQUAMOUS EPITHELIAL: 5 /HPF
UROBILINOGEN UR STRIP-MCNC: NORMAL MG/DL
WBC # BLD AUTO: 5.3 10E3/UL (ref 4–11)
WBC URINE: 29 /HPF

## 2021-11-01 PROCEDURE — 85379 FIBRIN DEGRADATION QUANT: CPT | Performed by: FAMILY MEDICINE

## 2021-11-01 PROCEDURE — 85384 FIBRINOGEN ACTIVITY: CPT | Performed by: FAMILY MEDICINE

## 2021-11-01 PROCEDURE — 83615 LACTATE (LD) (LDH) ENZYME: CPT | Performed by: FAMILY MEDICINE

## 2021-11-01 PROCEDURE — 250N000013 HC RX MED GY IP 250 OP 250 PS 637: Performed by: FAMILY MEDICINE

## 2021-11-01 PROCEDURE — 80053 COMPREHEN METABOLIC PANEL: CPT | Performed by: FAMILY MEDICINE

## 2021-11-01 PROCEDURE — 87086 URINE CULTURE/COLONY COUNT: CPT | Performed by: FAMILY MEDICINE

## 2021-11-01 PROCEDURE — 200N000001 HC R&B ICU

## 2021-11-01 PROCEDURE — 250N000009 HC RX 250: Performed by: FAMILY MEDICINE

## 2021-11-01 PROCEDURE — 86140 C-REACTIVE PROTEIN: CPT | Performed by: FAMILY MEDICINE

## 2021-11-01 PROCEDURE — 258N000003 HC RX IP 258 OP 636: Performed by: FAMILY MEDICINE

## 2021-11-01 PROCEDURE — 99291 CRITICAL CARE FIRST HOUR: CPT | Performed by: FAMILY MEDICINE

## 2021-11-01 PROCEDURE — 250N000011 HC RX IP 250 OP 636: Performed by: FAMILY MEDICINE

## 2021-11-01 PROCEDURE — 85610 PROTHROMBIN TIME: CPT | Performed by: FAMILY MEDICINE

## 2021-11-01 PROCEDURE — 999N000105 HC STATISTIC NO DOCUMENTATION TO SUPPORT CHARGE

## 2021-11-01 PROCEDURE — 81001 URINALYSIS AUTO W/SCOPE: CPT | Performed by: FAMILY MEDICINE

## 2021-11-01 PROCEDURE — 85027 COMPLETE CBC AUTOMATED: CPT | Performed by: FAMILY MEDICINE

## 2021-11-01 RX ADMIN — ENOXAPARIN SODIUM 40 MG: 40 INJECTION SUBCUTANEOUS at 08:39

## 2021-11-01 RX ADMIN — DEXAMETHASONE SODIUM PHOSPHATE 6 MG: 10 INJECTION INTRAMUSCULAR; INTRAVENOUS at 08:39

## 2021-11-01 RX ADMIN — SODIUM CHLORIDE 50 ML: 9 INJECTION, SOLUTION INTRAVENOUS at 18:26

## 2021-11-01 RX ADMIN — Medication 125 MCG: at 08:39

## 2021-11-01 RX ADMIN — Medication 220 MG: at 08:38

## 2021-11-01 RX ADMIN — REMDESIVIR 100 MG: 100 INJECTION, POWDER, LYOPHILIZED, FOR SOLUTION INTRAVENOUS at 18:25

## 2021-11-01 RX ADMIN — ENOXAPARIN SODIUM 40 MG: 40 INJECTION SUBCUTANEOUS at 20:27

## 2021-11-01 RX ADMIN — OXYCODONE HYDROCHLORIDE AND ACETAMINOPHEN 1000 MG: 500 TABLET ORAL at 08:38

## 2021-11-01 ASSESSMENT — ACTIVITIES OF DAILY LIVING (ADL)
ADLS_ACUITY_SCORE: 7
ADLS_ACUITY_SCORE: 5
ADLS_ACUITY_SCORE: 7

## 2021-11-01 ASSESSMENT — MIFFLIN-ST. JEOR: SCORE: 1556.62

## 2021-11-01 NOTE — PLAN OF CARE
"  Problem: Gas Exchange Impaired  Goal: Optimal Gas Exchange  Intervention: Optimize Oxygenation and Ventilation  Recent Flowsheet Documentation  Taken 11/1/2021 1200 by Rodo Zapata, RN  Head of Bed (HOB) Positioning: HOB at 20-30 degrees  Taken 11/1/2021 0800 by Rodo Zapata, RN  Head of Bed (HOB) Positioning: HOB at 20-30 degrees   Patient A&Ox4, slow to respond to questioning at times.  Seemed slightly paranoid this am, asking about stuff in room \"Is that blood over there on the table\", referring to insulin pen with orange label.  Dyspneic with activity. This afternoon delayed desaturations to 82-85% despite 100%FIO2 prior to activity and after.  Took 30 minutes to recuperate.  {Plan to use bedpan for next 24 hours.  Lung sounds with crackles in bases.  No cough.  VSS.   Rodo Zapata, RN      "

## 2021-11-01 NOTE — PROGRESS NOTES
[facility]    Medicine Progress Note - Hospitalist Service       Date of Admission:  10/29/2021    Assessment & Plan              Janiya Samuels is a 51 year old female admitted on 10/29/2021 with rapidly progressing respiratory failure from COVID-19 pneumonia on approximately day 8-9 of illness.  She developed rapid worsening within the first 24 hours increased from 6 L nasal cannula oxygen all the way up to requiring BiPAP 14/10 with FiO2 of 80% and ICU management.  She has been started on Remdesivir and dexamethasone and has received Actemra x1 on 10/30/21.  Over the past two days patient has required alternation between Bipap and HFNC to support respiratory ventilation - Bipap is 14/10 with FiO2 of 40%, HFNC 40-50L and 80%.  No worsening in oxygen needs in the past 24 hours but patient has not been able to titrate down on FiO2 significantly.  Transition between Bipap and HFNC today as needed for improved respiratory support today.  Of note, the UA ordered at time of admission has not yet been completed - nursing will obtain with next void.      # Confirmed COVID-19 infection    # Acute Hypoxic Respiratory Failure secondary to COVID-19 infection  # Viral Pneumonia secondary to COVID-19 infection    Symptom Onset 10/22/21   Date of 1st Positive Test 10/29/21   Vaccination Status Not vaccinated       - Continue ICU management.    - Oxygen: Transition between Bipap and HFNC today as needed, titrate FiO2 as above    - Labs: daily COVID labs ordered (CBC, creatinine, retic count, LDH, INR/PT, D-dimer, fibrinogen, troponin, CRP)   - Imaging: no additional imaging needed at this time  - Breathing treatments: no inhalers needed; avoid nebulizers in favor of MDIs   - IV fluids: not indicated at this time  - Antibiotics: not indicated   - COVID-Focused Medications: Dexamethasone 6 mg x 10 days (today is day 4 of 10), Remdesivir x 5 days (today is day 4 of 5).  Patient has already received 1  dose of Tocilizumab on 10/30/2021 following discussion with infectious disease.  On Vitamin C, Vitamin D and Zinc as per patient/family request.  - DVT Prophylaxis: at high risk of thrombotic complications due to COVID-19 (DDimer = 2.96 ug/mL FEU (Ref range: 0.00 - 0.50 ug/mL FEU) ).          - LOW INTENSITY dosing: Lovenox 0.5 mg/kg two times a day        - consider anticoag on discharge for 30 days & until return to normal mobility      Transaminitis    Assessment: Secondary to COVID-19 infection, repeat testing today shows ongoing improvement of both AST and ALT    Plan: monitor intermittently for return to normal baseline.       Prediabetes    Assessment: Hemoglobin A1c of 6.2, previously unknown.  Blood sugars elevated secondary to dexamethasone.     Plan: Continue with blood sugar monitoring before meals and bedtime, prn medium resistance sliding scale insulin Novolog and monitor.          Diet: Consistent Carb 60 grams CHO per Meal Diet    DVT Prophylaxis: Enoxaparin (Lovenox) SQ  Nguyen Catheter: Not present  Central Lines: PRESENT  PICC Double Lumen 10/30/21 Right Basilic-Site Assessment: WDL  Code Status: Full Code      Disposition Plan   Expected discharge: 11/04/2021   recommended to prior living arrangement once improvement of respiratory failure and medically stable to discharge.     The patient's care was discussed with the Bedside Nurse, Care Coordinator/ and Patient.    33 minutes spent in management of this critically ill patient so far today.     Simona De La Cruz MD  Hospitalist Service  [facility]  Securely message with the Vocera Web Console (learn more here)  Text page via [employer] Paging/Directory        Clinically Significant Risk Factors Present on Admission               ______________________________________________________________________    Interval History   Patient was on BiPAP overnight but has transition to high flow nasal cannula  this morning with adequate tolerance, continuing to need 70 to 100% FiO2 to maintain saturations with 40 L flow.  Appetite has been adequate, good urinary output however UA ordered at time of admission has still not been processed and nursing is aware.  Patient denies any new symptoms.  She is just frustrated by how slow everything seems to be moving and was hoping she would be feeling better by now.  She denies any new symptoms.  No new nursing concerns.    Data reviewed today: I reviewed all medications, new labs and imaging results over the last 24 hours.    Physical Exam   Vital Signs: Temp: 98.1  F (36.7  C) Temp src: Oral BP: (!) 130/92 Pulse: 71   Resp: 27 SpO2: 95 % O2 Device: High Flow Nasal Cannula (HFNC) Oxygen Delivery: 40 LPM  Weight: 198 lbs 0 oz  Constitutional: awake, alert, cooperative, fatigued but no apparent distress, and appears stated age  Respiratory: Patient does have tachypnea in the lower 20s even at rest, increases to the lower 30s when moving around in the bed to allow for improved exam.  Very diminished breath sounds diffusely but no crackles or wheezes auscultated  Cardiovascular: Regular rate and rhythm without murmur  GI: Bowel sounds are present, abdomen is soft, nondistended, nontender  Skin: no redness, warmth, or swelling and no rashes  Musculoskeletal: no lower extremity pitting edema present  Neurologic: Awake, alert, oriented to name, place and time.      Data   Recent Labs   Lab     11/01/21  0427 11/01/21  0426 10/31/21  0816 10/31/21  0528 10/30/21  1447 10/30/21  1149 10/30/21  1147 10/30/21  1146 10/29/21  1953 10/29/21  1254 10/29/21  1254   WBC      --  5.3  --  3.7*  --   --  4.4  --   --    < > 3.8*   HGB      --  12.8  --  12.8  --   --  13.6  --   --    < > 13.9   MCV      --  87  --  88  --   --  87  --   --    < > 86   PLT      --  424  --  379  --   --  288  --   --    < > 213   INR     1.15  --   --  1.10  --   --   --  1.07  --   --   --    NA      --  138  --   139  --  138  --   --   --    < > 130*   POTASSIUM      --  4.1  --  4.2  --  4.2  --   --   --    < > 3.8   CHLORIDE      --  108  --  110*  --  109  --   --   --    < > 98   CO2      --  26  --  25  --  26  --   --   --    < > 27   BUN      --  16  --  18  --  15  --   --   --    < > 12   CR      --  0.42*  --  0.41*  --  0.42*  --   --   --    < > 0.69   ANIONGAP      --  4  --  4  --  3  --   --   --    < > 5   HI      --  7.7*  --  7.9*  --  8.0*  --   --   --    < > 8.0*   GLC      --  174*   < > 177*   < > 158*  --   --    < >   < > 116*   ALBUMIN      --  2.2*  --  2.1*   < > 2.1*  --   --   --    < > 2.4*   PROTTOTAL      --  6.1*  --  6.1*   < > 6.4*  --   --   --    < > 6.9   BILITOTAL      --  0.4  --  0.3   < > 0.2  --   --   --    < > 0.3   ALKPHOS      --  53  --  50   < > 55  --   --   --    < > 56   ALT      --  54*  --  56*   < > 65*  --   --   --    < > 77*   AST      --  37  --  53*   < > 73*  --   --   --    < > 105*   TROPONIN      --   --   --  0.016  --  <0.015  --   --   --   --  <0.015    < > = values in this interval not displayed.

## 2021-11-01 NOTE — PROGRESS NOTES
BiPAP 14/10 @ 40-60% from [id number]. Switched back to HFNC 80%/40L at 0400 as patient was awake. LS diminished with crackles in DION. Bronchospastic, productive cough. SR/SB. Afebrile. Normotensive.

## 2021-11-02 PROBLEM — R73.03 PREDIABETES: Status: ACTIVE | Noted: 2021-11-02

## 2021-11-02 PROBLEM — R10.2 SUPRAPUBIC PAIN: Status: ACTIVE | Noted: 2021-11-02

## 2021-11-02 LAB
ANION GAP SERPL CALCULATED.3IONS-SCNC: 2 MMOL/L (ref 3–14)
BACTERIA UR CULT: NORMAL
BASE EXCESS BLDA CALC-SCNC: 5.1 MMOL/L (ref -9–1.8)
BUN SERPL-MCNC: 16 MG/DL (ref 7–30)
CALCIUM SERPL-MCNC: 7.7 MG/DL (ref 8.5–10.1)
CHLORIDE BLD-SCNC: 106 MMOL/L (ref 94–109)
CO2 SERPL-SCNC: 29 MMOL/L (ref 20–32)
CREAT SERPL-MCNC: 0.46 MG/DL (ref 0.52–1.04)
CRP SERPL-MCNC: 22.9 MG/L (ref 0–8)
D DIMER PPP FEU-MCNC: 1.89 UG/ML FEU (ref 0–0.5)
ERYTHROCYTE [DISTWIDTH] IN BLOOD BY AUTOMATED COUNT: 13.8 % (ref 10–15)
FIBRINOGEN PPP-MCNC: 431 MG/DL (ref 170–490)
GFR SERPL CREATININE-BSD FRML MDRD: >90 ML/MIN/1.73M2
GLUCOSE BLD-MCNC: 123 MG/DL (ref 70–99)
GLUCOSE BLDC GLUCOMTR-MCNC: 118 MG/DL (ref 70–99)
GLUCOSE BLDC GLUCOMTR-MCNC: 148 MG/DL (ref 70–99)
GLUCOSE BLDC GLUCOMTR-MCNC: 172 MG/DL (ref 70–99)
GLUCOSE BLDC GLUCOMTR-MCNC: 177 MG/DL (ref 70–99)
GLUCOSE BLDC GLUCOMTR-MCNC: 184 MG/DL (ref 70–99)
HCO3 BLD-SCNC: 29 MMOL/L (ref 21–28)
HCT VFR BLD AUTO: 38.6 % (ref 35–47)
HGB BLD-MCNC: 12.9 G/DL (ref 11.7–15.7)
INR PPP: 1.12 (ref 0.85–1.15)
LDH SERPL L TO P-CCNC: 361 U/L (ref 81–234)
MCH RBC QN AUTO: 29.2 PG (ref 26.5–33)
MCHC RBC AUTO-ENTMCNC: 33.4 G/DL (ref 31.5–36.5)
MCV RBC AUTO: 87 FL (ref 78–100)
O2/TOTAL GAS SETTING VFR VENT: 50 %
PCO2 BLD: 39 MM HG (ref 35–45)
PH BLD: 7.48 [PH] (ref 7.35–7.45)
PLATELET # BLD AUTO: 463 10E3/UL (ref 150–450)
PO2 BLD: 77 MM HG (ref 80–105)
POTASSIUM BLD-SCNC: 3.9 MMOL/L (ref 3.4–5.3)
RBC # BLD AUTO: 4.42 10E6/UL (ref 3.8–5.2)
SODIUM SERPL-SCNC: 137 MMOL/L (ref 133–144)
TROPONIN I SERPL-MCNC: <0.015 UG/L (ref 0–0.04)
WBC # BLD AUTO: 5.7 10E3/UL (ref 4–11)

## 2021-11-02 PROCEDURE — 99233 SBSQ HOSP IP/OBS HIGH 50: CPT | Performed by: PEDIATRICS

## 2021-11-02 PROCEDURE — 85384 FIBRINOGEN ACTIVITY: CPT | Performed by: FAMILY MEDICINE

## 2021-11-02 PROCEDURE — 200N000001 HC R&B ICU

## 2021-11-02 PROCEDURE — 250N000013 HC RX MED GY IP 250 OP 250 PS 637: Performed by: FAMILY MEDICINE

## 2021-11-02 PROCEDURE — 85027 COMPLETE CBC AUTOMATED: CPT | Performed by: FAMILY MEDICINE

## 2021-11-02 PROCEDURE — 250N000011 HC RX IP 250 OP 636: Performed by: FAMILY MEDICINE

## 2021-11-02 PROCEDURE — 83615 LACTATE (LD) (LDH) ENZYME: CPT | Performed by: FAMILY MEDICINE

## 2021-11-02 PROCEDURE — 86140 C-REACTIVE PROTEIN: CPT | Performed by: FAMILY MEDICINE

## 2021-11-02 PROCEDURE — 82803 BLOOD GASES ANY COMBINATION: CPT | Performed by: FAMILY MEDICINE

## 2021-11-02 PROCEDURE — 80048 BASIC METABOLIC PNL TOTAL CA: CPT | Performed by: FAMILY MEDICINE

## 2021-11-02 PROCEDURE — 258N000003 HC RX IP 258 OP 636: Performed by: FAMILY MEDICINE

## 2021-11-02 PROCEDURE — 250N000009 HC RX 250: Performed by: FAMILY MEDICINE

## 2021-11-02 PROCEDURE — 85610 PROTHROMBIN TIME: CPT | Performed by: FAMILY MEDICINE

## 2021-11-02 PROCEDURE — 85379 FIBRIN DEGRADATION QUANT: CPT | Performed by: FAMILY MEDICINE

## 2021-11-02 PROCEDURE — 99207 PR CDG-MDM COMPONENT: MEETS HIGH - UP CODED: CPT | Performed by: PEDIATRICS

## 2021-11-02 PROCEDURE — 84484 ASSAY OF TROPONIN QUANT: CPT | Performed by: FAMILY MEDICINE

## 2021-11-02 RX ORDER — AMOXICILLIN 250 MG
1 CAPSULE ORAL AT BEDTIME
Status: DISCONTINUED | OUTPATIENT
Start: 2021-11-02 | End: 2021-11-10 | Stop reason: HOSPADM

## 2021-11-02 RX ORDER — BISACODYL 5 MG
5 TABLET, DELAYED RELEASE (ENTERIC COATED) ORAL DAILY PRN
Status: DISCONTINUED | OUTPATIENT
Start: 2021-11-02 | End: 2021-11-10 | Stop reason: HOSPADM

## 2021-11-02 RX ORDER — ACETAMINOPHEN 500 MG
1000 TABLET ORAL EVERY 6 HOURS PRN
Status: DISCONTINUED | OUTPATIENT
Start: 2021-11-02 | End: 2021-11-10 | Stop reason: HOSPADM

## 2021-11-02 RX ADMIN — Medication 220 MG: at 08:33

## 2021-11-02 RX ADMIN — ENOXAPARIN SODIUM 40 MG: 40 INJECTION SUBCUTANEOUS at 21:16

## 2021-11-02 RX ADMIN — Medication 125 MCG: at 08:33

## 2021-11-02 RX ADMIN — ENOXAPARIN SODIUM 40 MG: 40 INJECTION SUBCUTANEOUS at 08:34

## 2021-11-02 RX ADMIN — DEXAMETHASONE SODIUM PHOSPHATE 6 MG: 10 INJECTION INTRAMUSCULAR; INTRAVENOUS at 08:33

## 2021-11-02 RX ADMIN — SODIUM CHLORIDE 50 ML: 9 INJECTION, SOLUTION INTRAVENOUS at 16:30

## 2021-11-02 RX ADMIN — OXYCODONE HYDROCHLORIDE AND ACETAMINOPHEN 1000 MG: 500 TABLET ORAL at 08:34

## 2021-11-02 RX ADMIN — REMDESIVIR 100 MG: 100 INJECTION, POWDER, LYOPHILIZED, FOR SOLUTION INTRAVENOUS at 16:25

## 2021-11-02 ASSESSMENT — ACTIVITIES OF DAILY LIVING (ADL)
ADLS_ACUITY_SCORE: 7

## 2021-11-02 ASSESSMENT — MIFFLIN-ST. JEOR: SCORE: 1548

## 2021-11-02 NOTE — PLAN OF CARE
A/Ox4. Afebrile. Flat affect, slow to responds at time. Heart rate regular, running high 50s-80s. Lung sounds diminished. Infrequent cough. Remains on HFNC throughout the day. Uses BiPAP when patient gets up to the bedside commode and tolerates well. Purewick removed today. Reported back and belly pain today, but was relieved with getting up to the commode and voiding, urine red in color from menses. Tolerating a carb diet with no nausea, fair appetite. Will continue to monitor.

## 2021-11-02 NOTE — PROGRESS NOTES
Pt has been on BIPAP 50% FIO2 all night.  sats do decrease to mid 80's for oral care, pt has remained in bed, using pure wick to void, urine red from menses.  Vss, afebrile. Monitor shows SB 50's.  Denies pain.

## 2021-11-02 NOTE — PLAN OF CARE
Patient was on high flow this evening until HS and was then changed to bipap as recommended by RT. Sats mid 90's on 50% FIo2 as of right now. Patient has poor activity tolerance. Moving around in bed on 100%, 40L high flow, sats would decrease to 84-85%. Pt attempted to use the bed pan but was not able to so purewick was placed for the night. She denies any pain. Vitals have been stable.

## 2021-11-02 NOTE — PROGRESS NOTES
Pt remains on HHFNC at 40 lpm and %.  Pt was having episodes of desaturation down to 83-84% after coming back from the commode.  Pt states she get very nervous when has to get out of bed, and SOB.  I discussed that we can have her use a bed pan instead of the commode to help reduce her WOB with excertion.  Pt agreed and thought this was a good idea for a day or two.  If she wants to use the bed pan, I would suggest her wear the BiPAP with settings of 14/10 cmH2O rate of 12 and 50% FiO2.  I would suggest patient continue to use the BiPAP at night.    Ethan Patel, RT on 11/1/2021 at 7:36 PM

## 2021-11-02 NOTE — PROGRESS NOTES
[facility]    Medicine Progress Note - Hospitalist Service       Date of Admission:  10/29/2021    Assessment & Plan                Janiya Samuels is a 51 year old female admitted on 10/29/2021 with rapidly progressing respiratory failure from COVID-19 pneumonia on approximately day 8-9 of illness necessitating initiation of BiPAP treatment in the ICU.  She is being treated with remdesivir and dexamethasone and received a dose of Actemra on 10/30/21.  She appears to be stabilizing tolerating intermittent use of high flow nasal cannula although again required Bipap overnight.  Other medical issues today include suprapubic abdominal pain without dysuria possibly due to UTI or constipation.  Patient says she thought she had completed menstruation recently.  She has a new diagnosis of prediabetes during this hospitalization with fluctuating glycemic control including hyperglycemia likely exacerbated by corticosteroids.  Initial mild elevation in hepatic transaminases has improved.    Principal Problem:    Pneumonia due to 2019 novel coronavirus  Active Problems:    Acute respiratory failure with hypoxia (H)    Prediabetes-A1c 6.2    Suprapubic pain    Transaminitis    Continue dexamethasone and remdesivir, today is day 4  May continue to alternate between high flow nasal cannula and BiPAP, advance oral diet when not on BiPAP  Symptomatic treatment of abdominal pain with analgesics as needed  Start laxative  Follow-up on urine culture result and add antibiotic if there is increasing concern for UTI but not starting antibiotics empirically at this time  May start to advance activity as tolerated from the standpoint of exertional dyspnea and exertional hypoxia       Diet: Consistent Carb 60 grams CHO per Meal Diet    DVT Prophylaxis: Enoxaparin (Lovenox) SQ  Nguyen Catheter: Not present  Central Lines: PRESENT  PICC Double Lumen 10/30/21 Right Basilic-Site Assessment: WDL  Code Status: Full  Code      Disposition Plan   Expected discharge: 11/08/2021   recommended to Be determined once Medically stable.     The patient's care was discussed with the Bedside Nurse, Care Coordinator/ and Patient.    Maverick Saunders MD  Hospitalist Service  [facility]  Securely message with the Vocera Web Console (learn more here)  Text page via [facility] Paging/Directory        Clinically Significant Risk Factors Present on Admission               ______________________________________________________________________    Interval History   Patient says that her lower back hurts.  She also says that her lower abdomen hurts.  She has only noticed those pains today.  They seem to come and go.  She has been lying in bed and has not tried moving much in bed.  Although she used high flow nasal cannula during the day yesterday, she developed worsening oxygenation and had to restart BiPAP overnight last night.  She has again returned to using high flow nasal cannula during the day today after improving overnight.  She remains afebrile.  She is intermittently bradycardic but normotensive to mildly hypertensive.  She is tolerating oral intake.  She is voiding spontaneously and denies any dysuria.  She says that she thought she had finished menstruating when she came into the hospital.  She is not sure when she last had a bowel movement and says she is normally regular at home.  She does not think she has had a bowel movement since she has been in the hospital.    Data reviewed today: I reviewed all medications, new labs and imaging results over the last 24 hours. I personally reviewed cardiac monitor demonstrating sinus bradycardia but no other dysrhythmias.    Physical Exam   Vital Signs: Temp: 97.5  F (36.4  C) Temp src: Oral BP: (!) 132/92 Pulse: (!) 48   Resp: 18 SpO2: 95 % O2 Device: High Flow Nasal Cannula (HFNC) Oxygen Delivery: 50 LPM  Weight: 196 lbs 1.6 oz Body mass index is 29.82  kg/m .  Vitals:    10/29/21 1211 10/30/21 1317 11/01/21 0400 11/02/21 0600   Weight: 88.5 kg (195 lb) 90.3 kg (199 lb) 89.8 kg (198 lb) 89 kg (196 lb 1.6 oz)     General Appearance: No acute distress resting in bed on high flow nasal cannula, easily speaks full sentences  Respiratory: Normal respiratory effort, diminished breath sounds, clear lungs  Cardiovascular: Slow but regular heart rate and rhythm, normal capillary refill  GI: Normal bowel sounds, nondistended abdomen, soft, mild suprapubic tenderness without peritoneal signs  Skin: No rash, skin entry sites of PICC line and arterial line are without bleeding, drainage, or surrounding erythema  Other: Alert and maintains wakefulness and attention, slow to answer questions but generally answers questions appropriately    Data   Recent Labs   Lab 11/02/21  0637 11/02/21 0219 11/01/21 2029 11/01/21  0837 11/01/21  0427 11/01/21  0426 10/31/21  0816 10/31/21  0528 10/30/21  1447 10/30/21  1149   WBC 5.7  --   --   --   --  5.3  --  3.7*   < >  --    HGB 12.9  --   --   --   --  12.8  --  12.8   < >  --    MCV 87  --   --   --   --  87  --  88   < >  --    *  --   --   --   --  424  --  379   < >  --    INR 1.12  --   --   --  1.15  --   --  1.10   < >  --      --   --   --   --  138  --  139   < > 138   POTASSIUM 3.9  --   --   --   --  4.1  --  4.2   < > 4.2   CHLORIDE 106  --   --   --   --  108  --  110*   < > 109   CO2 29  --   --   --   --  26  --  25   < > 26   BUN 16  --   --   --   --  16  --  18   < > 15   CR 0.46*  --   --   --   --  0.42*  --  0.41*   < > 0.42*   ANIONGAP 2*  --   --   --   --  4  --  4   < > 3   HI 7.7*  --   --   --   --  7.7*  --  7.9*   < > 8.0*   * 148* 201*   < >  --  174*   < > 177*   < > 158*   ALBUMIN  --   --   --   --   --  2.2*  --  2.1*   < > 2.1*   PROTTOTAL  --   --   --   --   --  6.1*  --  6.1*   < > 6.4*   BILITOTAL  --   --   --   --   --  0.4  --  0.3   < > 0.2   ALKPHOS  --   --   --   --   --   53  --  50   < > 55   ALT  --   --   --   --   --  54*  --  56*   < > 65*   AST  --   --   --   --   --  37  --  53*   < > 73*   TROPONIN <0.015  --   --   --   --   --   --  0.016  --  <0.015    < > = values in this interval not displayed.     Urinalysis demonstrated large amount of blood but was negative for leukocyte esterase and nitrites, urine culture results are pending    Recent Labs   Lab 11/02/21  0640 10/31/21  1305 10/31/21  0911 10/30/21  1632   PH 7.48* 7.46* 7.48* 7.46*   PCO2 39 36 35 34*   PO2 77* 108* 64* 169*   HCO3 29* 26 26 24   O2PER 50 100 100 65     PaO2 to FiO2 ratio 154    Medications       remdesivir  100 mg Intravenous Q24H    And     sodium chloride 0.9%  50 mL Intravenous Q24H     cholecalciferol  125 mcg Oral Daily     dexamethasone  6 mg Intravenous Q24H     enoxaparin ANTICOAGULANT  0.5 mg/kg Subcutaneous BID     insulin aspart  1-7 Units Subcutaneous TID AC     insulin aspart  1-5 Units Subcutaneous At Bedtime     sodium chloride (PF)  10-40 mL Intracatheter Q8H     vitamin C  1,000 mg Oral Daily     zinc sulfate  220 mg Oral Daily

## 2021-11-03 LAB
ANION GAP SERPL CALCULATED.3IONS-SCNC: 1 MMOL/L (ref 3–14)
BASE EXCESS BLDA CALC-SCNC: 4.5 MMOL/L (ref -9–1.8)
BASE EXCESS BLDA CALC-SCNC: 5.3 MMOL/L (ref -9–1.8)
BUN SERPL-MCNC: 15 MG/DL (ref 7–30)
CALCIUM SERPL-MCNC: 7.8 MG/DL (ref 8.5–10.1)
CHLORIDE BLD-SCNC: 108 MMOL/L (ref 94–109)
CO2 SERPL-SCNC: 30 MMOL/L (ref 20–32)
CREAT SERPL-MCNC: 0.43 MG/DL (ref 0.52–1.04)
CRP SERPL-MCNC: 15.3 MG/L (ref 0–8)
D DIMER PPP FEU-MCNC: 2.18 UG/ML FEU (ref 0–0.5)
ERYTHROCYTE [DISTWIDTH] IN BLOOD BY AUTOMATED COUNT: 13.6 % (ref 10–15)
FIBRINOGEN PPP-MCNC: 368 MG/DL (ref 170–490)
GFR SERPL CREATININE-BSD FRML MDRD: >90 ML/MIN/1.73M2
GLUCOSE BLD-MCNC: 111 MG/DL (ref 70–99)
GLUCOSE BLDC GLUCOMTR-MCNC: 100 MG/DL (ref 70–99)
GLUCOSE BLDC GLUCOMTR-MCNC: 117 MG/DL (ref 70–99)
GLUCOSE BLDC GLUCOMTR-MCNC: 119 MG/DL (ref 70–99)
GLUCOSE BLDC GLUCOMTR-MCNC: 95 MG/DL (ref 70–99)
HCO3 BLD-SCNC: 29 MMOL/L (ref 21–28)
HCO3 BLD-SCNC: 29 MMOL/L (ref 21–28)
HCT VFR BLD AUTO: 39.2 % (ref 35–47)
HGB BLD-MCNC: 13.2 G/DL (ref 11.7–15.7)
INR PPP: 1.11 (ref 0.85–1.15)
LDH SERPL L TO P-CCNC: 357 U/L (ref 81–234)
MCH RBC QN AUTO: 29.1 PG (ref 26.5–33)
MCHC RBC AUTO-ENTMCNC: 33.7 G/DL (ref 31.5–36.5)
MCV RBC AUTO: 87 FL (ref 78–100)
O2/TOTAL GAS SETTING VFR VENT: 50 %
O2/TOTAL GAS SETTING VFR VENT: 50 %
PCO2 BLD: 37 MM HG (ref 35–45)
PCO2 BLD: 42 MM HG (ref 35–45)
PH BLD: 7.45 [PH] (ref 7.35–7.45)
PH BLD: 7.5 [PH] (ref 7.35–7.45)
PLATELET # BLD AUTO: 493 10E3/UL (ref 150–450)
PO2 BLD: 65 MM HG (ref 80–105)
PO2 BLD: 67 MM HG (ref 80–105)
POTASSIUM BLD-SCNC: 3.7 MMOL/L (ref 3.4–5.3)
RBC # BLD AUTO: 4.53 10E6/UL (ref 3.8–5.2)
SODIUM SERPL-SCNC: 139 MMOL/L (ref 133–144)
WBC # BLD AUTO: 6.2 10E3/UL (ref 4–11)

## 2021-11-03 PROCEDURE — 99233 SBSQ HOSP IP/OBS HIGH 50: CPT | Performed by: PEDIATRICS

## 2021-11-03 PROCEDURE — 120N000001 HC R&B MED SURG/OB

## 2021-11-03 PROCEDURE — 82565 ASSAY OF CREATININE: CPT | Performed by: FAMILY MEDICINE

## 2021-11-03 PROCEDURE — 85379 FIBRIN DEGRADATION QUANT: CPT | Performed by: FAMILY MEDICINE

## 2021-11-03 PROCEDURE — 250N000011 HC RX IP 250 OP 636: Performed by: PEDIATRICS

## 2021-11-03 PROCEDURE — 82803 BLOOD GASES ANY COMBINATION: CPT | Performed by: PEDIATRICS

## 2021-11-03 PROCEDURE — 86140 C-REACTIVE PROTEIN: CPT | Performed by: FAMILY MEDICINE

## 2021-11-03 PROCEDURE — 85384 FIBRINOGEN ACTIVITY: CPT | Performed by: FAMILY MEDICINE

## 2021-11-03 PROCEDURE — 83615 LACTATE (LD) (LDH) ENZYME: CPT | Performed by: FAMILY MEDICINE

## 2021-11-03 PROCEDURE — 250N000011 HC RX IP 250 OP 636: Performed by: FAMILY MEDICINE

## 2021-11-03 PROCEDURE — 85610 PROTHROMBIN TIME: CPT | Performed by: FAMILY MEDICINE

## 2021-11-03 PROCEDURE — 85027 COMPLETE CBC AUTOMATED: CPT | Performed by: FAMILY MEDICINE

## 2021-11-03 PROCEDURE — 250N000013 HC RX MED GY IP 250 OP 250 PS 637: Performed by: FAMILY MEDICINE

## 2021-11-03 PROCEDURE — 999N000157 HC STATISTIC RCP TIME EA 10 MIN

## 2021-11-03 PROCEDURE — 99207 PR CDG-MDM COMPONENT: MEETS HIGH - UP CODED: CPT | Performed by: PEDIATRICS

## 2021-11-03 RX ADMIN — Medication 220 MG: at 08:45

## 2021-11-03 RX ADMIN — Medication 125 MCG: at 08:46

## 2021-11-03 RX ADMIN — OXYCODONE HYDROCHLORIDE AND ACETAMINOPHEN 1000 MG: 500 TABLET ORAL at 08:45

## 2021-11-03 RX ADMIN — DEXAMETHASONE SODIUM PHOSPHATE 6 MG: 10 INJECTION INTRAMUSCULAR; INTRAVENOUS at 08:42

## 2021-11-03 RX ADMIN — ENOXAPARIN SODIUM 40 MG: 40 INJECTION SUBCUTANEOUS at 20:14

## 2021-11-03 RX ADMIN — ENOXAPARIN SODIUM 40 MG: 40 INJECTION SUBCUTANEOUS at 08:41

## 2021-11-03 ASSESSMENT — MIFFLIN-ST. JEOR: SCORE: 1497.2

## 2021-11-03 ASSESSMENT — ACTIVITIES OF DAILY LIVING (ADL)
ADLS_ACUITY_SCORE: 7
ADLS_ACUITY_SCORE: 8
ADLS_ACUITY_SCORE: 7
ADLS_ACUITY_SCORE: 8
ADLS_ACUITY_SCORE: 8
ADLS_ACUITY_SCORE: 7
ADLS_ACUITY_SCORE: 7
ADLS_ACUITY_SCORE: 8
ADLS_ACUITY_SCORE: 8
ADLS_ACUITY_SCORE: 7
ADLS_ACUITY_SCORE: 8
ADLS_ACUITY_SCORE: 8
ADLS_ACUITY_SCORE: 7
ADLS_ACUITY_SCORE: 8
ADLS_ACUITY_SCORE: 7
ADLS_ACUITY_SCORE: 7

## 2021-11-03 NOTE — PLAN OF CARE
Problem: Adult Inpatient Plan of Care  Goal: Plan of Care Review  Outcome: Improving  Flowsheets (Taken 11/3/2021 1856)  Plan of Care Reviewed With: patient     Problem: Gas Exchange Impaired  Goal: Optimal Gas Exchange  Outcome: Improving  Intervention: Optimize Oxygenation and Ventilation  Recent Flowsheet Documentation  Taken 11/3/2021 1545 by Neda Chavarria RN  Head of Bed (HOB) Positioning: HOB at 60-90 degrees  Taken 11/3/2021 1205 by Neda Chavarria RN  Head of Bed (HOB) Positioning: HOB at 60-90 degrees  Taken 11/3/2021 0824 by Neda Chavarria RN  Head of Bed (HOB) Positioning: HOB at 60-90 degrees    Patient states she is feeling good today, no complaints of pain. Has been up to bedside commode on a few occassions, had BM today and voiding well.  Poor appetite for breakfast and supper, Lunch appetite was fair and ate about 50% of meal.  Offered her an ensure supplement, stated she may like a chocolate ensure later.  Has remained on high flow nasal cannula all day, weaned down to 45% and 30 Flow from 50% and 35 flow as she was on this morning.  Slow to respond, slow to process (Covid fog, perhaps), but once she does answer, she answers appropriately.  Alert and oriented. Arterial line discontinued and telemetry removed for transport out to med/surg floor.    Moving now to room 252 in med/surg.

## 2021-11-03 NOTE — PROGRESS NOTES
[facility]    Medicine Progress Note - Hospitalist Service       Date of Admission:  10/29/2021    Assessment & Plan                Janiya Samuels is a 51 year old female admitted on 10/29/2021 with rapidly progressing respiratory failure from COVID-19 pneumonia on approximately day 8-9 of illness necessitating initiation of BiPAP treatment in the ICU.  She completed treatment with remdesivir, continues dexamethasone and received a dose of Actemra on 10/30/21.  She appears to be stabilizing tolerating mostly use of high flow nasal cannula over the last 24 hours although was placed on Bipap overnight for a few hours.  Both suprapubic abdominal pain and back pain resolved after she had a bowel movement suggesting they were caused by constipation.  She has a new diagnosis of prediabetes during this hospitalization with fluctuating glycemic control including hyperglycemia likely exacerbated by corticosteroids.  Initial mild elevation in hepatic transaminases has improved.    Principal Problem:    Pneumonia due to 2019 novel coronavirus  Active Problems:    Acute respiratory failure with hypoxia (H)    Prediabetes-A1c 6.2    Suprapubic pain    Transaminitis      Continue dexamethasone, today is day 6 of dexamethasone  Anticipate continued high flow nasal cannula without need for BiPAP due to her improvement, will remove arterial line and transfer to floor  Advance diet, encourage oral intake  May start to advance activity as tolerated from the standpoint of exertional dyspnea and exertional hypoxia         Diet: Consistent Carb 60 grams CHO per Meal Diet    DVT Prophylaxis: Enoxaparin (Lovenox) SQ  Nguyen Catheter: Not present  Central Lines: PRESENT  PICC Double Lumen 10/30/21 Right Basilic-Site Assessment: WDL  Code Status: Full Code      Disposition Plan   Expected discharge: 11/08/2021   recommended to Be determined once Medically stable.     The patient's care was discussed with the  Bedside Nurse, Care Coordinator/ and Patient.    Maverick Saunders MD  Hospitalist Service  [facility]  Securely message with the [employer] Web Console (learn more here)  Text page via [employer] Paging/Directory        Clinically Significant Risk Factors Present on Admission                ______________________________________________________________________    Interval History   Nursing reports that the patient was placed on BiPAP overnight last night for about 4 hours.  Her respiratory status did not worsen nor did she have worsening hypoxia on high flow nasal cannula, but she was just put on BiPAP.  She was then switched back to high flow nasal cannula early this morning which she has tolerated all day today so far.  She remains afebrile.  Heart rate is fluctuated from bradycardic to normal.  Blood pressure is stable.  Oxygenation appears to be improving.  She is voiding spontaneously.  Her abdominal and back pain completely resolved after she had a bowel movement.  She is not eating much yet.    Data reviewed today: I reviewed all medications, new labs and imaging results over the last 24 hours. I personally reviewed cardiac monitor strips which have demonstrated sinus bradycardia but no significant dysrhythmias.    Physical Exam   Vital Signs: Temp: 98.2  F (36.8  C) Temp src: Oral BP: 124/85 Pulse: 72   Resp: 13 SpO2: 93 % O2 Device: High Flow Nasal Cannula (HFNC) Oxygen Delivery: 35 LPM with FiO2 50%  Weight: 184 lbs 14.4 oz   Vitals:    10/29/21 1211 10/30/21 1317 11/01/21 0400 11/02/21 0600   Weight: 88.5 kg (195 lb) 90.3 kg (199 lb) 89.8 kg (198 lb) 89 kg (196 lb 1.6 oz)    11/03/21 0900   Weight: 83.9 kg (184 lb 14.4 oz)     General Appearance: Middle-aged woman, no distress resting in bed on high flow nasal cannula oxygen, able to speak full sentences  Respiratory: Normal respiratory effort, clear lungs  Cardiovascular: Regular rate and rhythm, good radial pulse,  normal capillary refill  GI: Nondistended abdomen, soft, no tenderness  Skin: Skin entry sites of arterial line and PICC line are without bleeding, drainage, or surrounding erythema  Other: Alert and appears to maintain wakefulness and attention although is slow to answer questions    Data   Recent Labs   Lab 11/03/21  1136 11/03/21  0822 11/03/21  0535 11/02/21  0755 11/02/21  0637 11/01/21  0837 11/01/21  0427 11/01/21  0426 10/31/21  0816 10/31/21  0528 10/31/21  0528 10/30/21  1447 10/30/21  1149   WBC  --   --  6.2  --  5.7  --   --  5.3   < >  --  3.7*   < >  --    HGB  --   --  13.2  --  12.9  --   --  12.8   < >  --  12.8   < >  --    MCV  --   --  87  --  87  --   --  87   < >  --  88   < >  --    PLT  --   --  493*  --  463*  --   --  424   < >  --  379   < >  --    INR  --   --  1.11  --  1.12  --  1.15  --   --    < > 1.10   < >  --    NA  --   --  139  --  137  --   --  138   < >  --  139   < > 138   POTASSIUM  --   --  3.7  --  3.9  --   --  4.1   < >  --  4.2   < > 4.2   CHLORIDE  --   --  108  --  106  --   --  108   < >  --  110*   < > 109   CO2  --   --  30  --  29  --   --  26   < >  --  25   < > 26   BUN  --   --  15  --  16  --   --  16   < >  --  18   < > 15   CR  --   --  0.43*  --  0.46*  --   --  0.42*   < >  --  0.41*   < > 0.42*   ANIONGAP  --   --  1*  --  2*  --   --  4   < >  --  4   < > 3   HI  --   --  7.8*  --  7.7*  --   --  7.7*   < >  --  7.9*   < > 8.0*   GLC 95 100* 111*   < > 123*   < >  --  174*   < >  --  177*   < > 158*   ALBUMIN  --   --   --   --   --   --   --  2.2*  --   --  2.1*   < > 2.1*   PROTTOTAL  --   --   --   --   --   --   --  6.1*  --   --  6.1*   < > 6.4*   BILITOTAL  --   --   --   --   --   --   --  0.4  --   --  0.3   < > 0.2   ALKPHOS  --   --   --   --   --   --   --  53  --   --  50   < > 55   ALT  --   --   --   --   --   --   --  54*  --   --  56*   < > 65*   AST  --   --   --   --   --   --   --  37  --   --  53*   < > 73*   TROPONIN  --   --   --    --  <0.015  --   --   --   --   --  0.016  --  <0.015    < > = values in this interval not displayed.     Blood sugars range 111-184 over the last day  D-dimer 2.18 today  Urine culture grew 10-50,000 colonies per mL of mixed urogenital harlan  CRP 15 today, improved from 23 yesterday   today, improved from 361 yesterday    Recent Labs   Lab 11/03/21  1131 11/03/21  0539 11/02/21  0640 10/31/21  1305   PH 7.50* 7.45 7.48* 7.46*   PCO2 37 42 39 36   PO2 65* 67* 77* 108*   HCO3 29* 29* 29* 26   O2PER 50 50 50 100     PaO2 to FiO2 ratio today is 130-134    Medications       cholecalciferol  125 mcg Oral Daily     dexamethasone  6 mg Intravenous Q24H     enoxaparin ANTICOAGULANT  0.5 mg/kg Subcutaneous BID     insulin aspart  1-7 Units Subcutaneous TID AC     insulin aspart  1-5 Units Subcutaneous At Bedtime     senna-docusate  1 tablet Oral At Bedtime     sodium chloride (PF)  10-40 mL Intracatheter Q8H     vitamin C  1,000 mg Oral Daily     zinc sulfate  220 mg Oral Daily

## 2021-11-03 NOTE — PROGRESS NOTES
11/03/21 1300   Mode: CPAP/ BiPAP/ AVAPS/ AVAPS AE   CPAP/BiPAP/ AVAPS/ AVAPS AE Mode CPAP   CPAP/BiPAP/Settings   Oxygen (%) 45   O2 Flow Rate (L/min) 30   RT Device Skin Assessment   Oxygen Delivery Device High Flow Nasal Cannula   Site Appearance neck circumference Clean and dry   Site Appearance nares (inner) Clean and dry   Site Appearance lips Clean and dry   Site Appearance bridge of nose Clean and dry   Site Appearance of ears Clean and dry   Site Appearance occiput Clean and dry   Strap Tightness Finger Allowance between head and device strap   Skin Interventions Taken No adjustments needed   Respiratory WDL   Respiratory WDL X;breath sounds;effort/expansion   Rhythm/Pattern, Respiratory dyspnea on exertion   Expansion/Accessory Muscles/Retractions abdominal muscle use   Cough Frequency infrequent   Cough Type dry;good   Breath Sounds   Breath Sounds All Fields   All Lung Fields Breath Sounds diminished   DION Breath Sounds diminished   LLL Breath Sounds crackles   RUL Breath Sounds diminished   RML Breath Sounds diminished   RLL Breath Sounds crackles     Weaning oxygen and flow for work of breathing and SpO2.

## 2021-11-03 NOTE — PLAN OF CARE
S-(situation): shift note     B-(background): Covid Pneumonia    A-(assessment): patient alert and oriented x 4 but seems to be slow to respond to questions and has a flat affect. Lungs clear but diminished she has a strong loose nonproductive cough, she complained of shortness of air with activity but remained > 90% on BiPAP 50% with activity. Vitals stable afebrile. She went back on High flow oxygen at 0330 tolerating well . Had a low saturation 88-87% for a little span of time and an ABG was obtained then she remained 90-94%,    R-(recommendations): continue to monitor vitals and saturations notify MD of any further changes.

## 2021-11-03 NOTE — PROGRESS NOTES
11/03/21 1800   Oxygen Therapy   SpO2 93 %   O2 Device High Flow Nasal Cannula (HFNC)   FiO2 (%) 45 %  (flow 30)   Assessment   Respiratory WDL X   Rhythm/Pattern, Respiratory assisted mechanically   Expansion/Accessory Muscles/Retractions diaphragmatic   Cough Frequency infrequent   Cough Type congested;good;no productive sputum   Breath Sounds   DION Breath Sounds diminished   RLL Breath Sounds diminished   LLL Breath Sounds crackles   RUL Breath Sounds diminished   RML Breath Sounds diminished     Weaned off BIPAP this morning   Patient continues to require HFNC for ventilatory support  FIO2 weaned to 45% and flow down to 30  Adjust over night as needed for work of breathing and SpO2

## 2021-11-03 NOTE — PROGRESS NOTES
Arterial line removed in preparation to moving out to med/surg floor, tip intact, tolerated without incident.    Telemetry removed.

## 2021-11-04 PROBLEM — R10.2 SUPRAPUBIC PAIN: Status: RESOLVED | Noted: 2021-11-02 | Resolved: 2021-11-04

## 2021-11-04 LAB
GLUCOSE BLDC GLUCOMTR-MCNC: 118 MG/DL (ref 70–99)
GLUCOSE BLDC GLUCOMTR-MCNC: 128 MG/DL (ref 70–99)
GLUCOSE BLDC GLUCOMTR-MCNC: 168 MG/DL (ref 70–99)
GLUCOSE BLDC GLUCOMTR-MCNC: 175 MG/DL (ref 70–99)

## 2021-11-04 PROCEDURE — 250N000013 HC RX MED GY IP 250 OP 250 PS 637: Performed by: PEDIATRICS

## 2021-11-04 PROCEDURE — 120N000001 HC R&B MED SURG/OB

## 2021-11-04 PROCEDURE — 99233 SBSQ HOSP IP/OBS HIGH 50: CPT | Performed by: PEDIATRICS

## 2021-11-04 PROCEDURE — 99207 PR CDG-MDM COMPONENT: MEETS HIGH - UP CODED: CPT | Performed by: PEDIATRICS

## 2021-11-04 PROCEDURE — 250N000011 HC RX IP 250 OP 636: Performed by: PEDIATRICS

## 2021-11-04 RX ADMIN — SENNOSIDES AND DOCUSATE SODIUM 1 TABLET: 8.6; 5 TABLET ORAL at 21:36

## 2021-11-04 RX ADMIN — OXYCODONE HYDROCHLORIDE AND ACETAMINOPHEN 1000 MG: 500 TABLET ORAL at 08:32

## 2021-11-04 RX ADMIN — Medication 220 MG: at 08:32

## 2021-11-04 RX ADMIN — ENOXAPARIN SODIUM 40 MG: 40 INJECTION SUBCUTANEOUS at 08:33

## 2021-11-04 RX ADMIN — DEXAMETHASONE SODIUM PHOSPHATE 6 MG: 10 INJECTION INTRAMUSCULAR; INTRAVENOUS at 08:33

## 2021-11-04 RX ADMIN — Medication 125 MCG: at 13:51

## 2021-11-04 ASSESSMENT — ACTIVITIES OF DAILY LIVING (ADL)
ADLS_ACUITY_SCORE: 9
ADLS_ACUITY_SCORE: 9
ADLS_ACUITY_SCORE: 8
ADLS_ACUITY_SCORE: 9
ADLS_ACUITY_SCORE: 9
ADLS_ACUITY_SCORE: 8
ADLS_ACUITY_SCORE: 8
ADLS_ACUITY_SCORE: 9
ADLS_ACUITY_SCORE: 9
ADLS_ACUITY_SCORE: 8
ADLS_ACUITY_SCORE: 9
ADLS_ACUITY_SCORE: 8
ADLS_ACUITY_SCORE: 9
ADLS_ACUITY_SCORE: 8
ADLS_ACUITY_SCORE: 8
ADLS_ACUITY_SCORE: 9
ADLS_ACUITY_SCORE: 8
ADLS_ACUITY_SCORE: 9

## 2021-11-04 NOTE — PLAN OF CARE
Patient remains on high flow oxygen, 30L and 45% Fio2. Sats have ranged from 90%-95%. Pt did have a coughing episode that required increases in oxygen and flow for a short time but has since been reduced to original settings. Sats 82% while using the bedside commode. Lungs are diminished throughout. She denies any pain.

## 2021-11-04 NOTE — PLAN OF CARE
Patient weaned to 6L 02 via NC. Desats with activity to 81% with quick recovery with prompts to breathe in through the nose. Loose productive cough. PICC line removed-patient tolerated procedure. MD switched to oral Decadron. Up with 1 assist to bedside commode.LS-crackles noted.  Poor appetite for meals.           Problem: Adult Inpatient Plan of Care  Goal: Patient-Specific Goal (Individualized)  Outcome: Improving  Flowsheets (Taken 11/3/2021 0737 by Nila Diaz RN)  Patient-Specific Goals (Include Timeframe): Patient will be less short of air with activity in 2 days,  Goal: Absence of Hospital-Acquired Illness or Injury  Intervention: Identify and Manage Fall Risk  Recent Flowsheet Documentation  Taken 11/4/2021 0753 by Latha Cowart RN  Safety Promotion/Fall Prevention:   bed alarm on   assistive device/personal items within reach   clutter free environment maintained   fall prevention program maintained  Intervention: Prevent Skin Injury  Recent Flowsheet Documentation  Taken 11/4/2021 0753 by Latha Cowart RN  Body Position: position changed independently  Intervention: Prevent and Manage VTE (Venous Thromboembolism) Risk  Recent Flowsheet Documentation  Taken 11/4/2021 0753 by Latha Cowart RN  VTE Prevention/Management: anticoagulant therapy maintained  Intervention: Prevent Infection  Recent Flowsheet Documentation  Taken 11/4/2021 0753 by Latha Cowart RN  Infection Prevention: hand hygiene promoted     Problem: Gas Exchange Impaired  Goal: Optimal Gas Exchange  Intervention: Optimize Oxygenation and Ventilation  Recent Flowsheet Documentation  Taken 11/4/2021 0753 by Latha Cowart RN  Head of Bed (HOB) Positioning: HOB at 30 degrees

## 2021-11-04 NOTE — PROGRESS NOTES
[facility]    Medicine Progress Note - Hospitalist Service       Date of Admission:  10/29/2021    Assessment & Plan               Janiya Samuels is a 51 year old female admitted on 10/29/2021 with rapidly progressing respiratory failure from COVID-19 pneumonia on approximately day 8-9 of illness necessitating initiation of BiPAP treatment in the ICU.  She completed treatment with remdesivir, continues dexamethasone and received a dose of Actemra on 10/30/21.  Oxygenation has significantly improved over the last 24 hours such that she has now been able to wean to regular nasal cannula supplementation this morning.  She has a new diagnosis of prediabetes during this hospitalization with fluctuating glycemic control including hyperglycemia likely exacerbated by corticosteroids, but overall her hyperglycemia is much improved and she has only needed 1 dose of 1 unit of NovoLog insulin by correction scale in the last 24 hours.  Initial mild elevation in hepatic transaminases has improved.    Principal Problem:    Pneumonia due to 2019 novel coronavirus  Active Problems:    Acute respiratory failure with hypoxia (H)    Prediabetes-A1c 6.2    Transaminitis    Continue dexamethasone, today is day 7 of dexamethasone  Wean nasal cannula oxygen supplementation as tolerated to keep saturations 90 to 96%, anticipate considering hospital discharge once oxygen saturations have been adequate on 2 to 3 L nasal cannula supplementation for at least 24 hours  Advance diet, encourage oral intake  May start to advance activity as tolerated from the standpoint of exertional dyspnea and exertional hypoxia  Now that she has transferred from the ICU to the floor and is advancing activity, will reduce prophylactic Lovenox dosing to once daily rather than twice daily  Because blood sugars have improved and she has had only 1 unit of sliding scale NovoLog insulin in the last 24 hours, will discontinue NovoLog  today while continuing to monitor blood sugars at least while she is receiving dexamethasone       Diet: Consistent Carb 60 grams CHO per Meal Diet    DVT Prophylaxis: Enoxaparin (Lovenox) SQ  Nguyen Catheter: Not present  Central Lines: None  Code Status: Full Code      Disposition Plan   Expected discharge: 2 to 3 days recommended to prior living arrangement with probable need for home oxygen supplementation once O2 use less than 2-3 liters/minute for at least 24 hours.     The patient's care was discussed with the Bedside Nurse, Care Coordinator/ and Patient.    Maverick Saunders MD  Hospitalist Service  [facility]  Securely message with the Vocera Web Console (learn more here)  Text page via [facility] Paging/Directory        Clinically Significant Risk Factors Present on Admission                ______________________________________________________________________    Interval History   She says she feels about the same today from a breathing standpoint.  She is dyspneic with activity but not at rest.  She was transferred from the ICU to the floor last night and did well on high flow nasal cannula oxygen supplementation through the night.  She had 2 brief oxygen desaturations 1 of which was associated with a severe coughing spell.  This morning she was able to transition to regular nasal cannula oxygen supplementation without difficulty.  She remains afebrile and hemodynamically stable.  Urine output has been good.  Appetite is improving.    Data reviewed today: I reviewed all medications, new labs and imaging results over the last 24 hours. I personally reviewed no images or EKG's today.    Physical Exam   Vital Signs: Temp: 97.6  F (36.4  C) Temp src: Oral BP: 105/74 Pulse: 86   Resp: 20 SpO2: 94 % O2 Device: Nasal cannula with humidification Oxygen Delivery: 6 LPM  Weight: 184 lbs 14.4 oz   Vitals:    10/29/21 1211 10/30/21 1317 11/01/21 0400 11/02/21 0600   Weight: 88.5  kg (195 lb) 90.3 kg (199 lb) 89.8 kg (198 lb) 89 kg (196 lb 1.6 oz)    11/03/21 0900   Weight: 83.9 kg (184 lb 14.4 oz)     General Appearance: Appears to be breathing comfortably while resting in bed, no signs of distress  Respiratory: Normal respiratory effort, diminished breath sounds, clear lung fields  Cardiovascular: Regular rate and rhythm, good radial pulse, brisk capillary refill, no peripheral edema     Data   Recent Labs   Lab 11/04/21  0750 11/03/21 2012 11/03/21  1136 11/03/21  0822 11/03/21  0535 11/02/21  0755 11/02/21  0637 11/01/21  0837 11/01/21  0427 11/01/21  0426 10/31/21  0816 10/31/21  0528 10/31/21  0528 10/30/21  1447 10/30/21  1149   WBC  --   --   --   --  6.2  --  5.7  --   --  5.3   < >  --  3.7*   < >  --    HGB  --   --   --   --  13.2  --  12.9  --   --  12.8   < >  --  12.8   < >  --    MCV  --   --   --   --  87  --  87  --   --  87   < >  --  88   < >  --    PLT  --   --   --   --  493*  --  463*  --   --  424   < >  --  379   < >  --    INR  --   --   --   --  1.11  --  1.12  --  1.15  --   --    < > 1.10   < >  --    NA  --   --   --   --  139  --  137  --   --  138   < >  --  139   < > 138   POTASSIUM  --   --   --   --  3.7  --  3.9  --   --  4.1   < >  --  4.2   < > 4.2   CHLORIDE  --   --   --   --  108  --  106  --   --  108   < >  --  110*   < > 109   CO2  --   --   --   --  30  --  29  --   --  26   < >  --  25   < > 26   BUN  --   --   --   --  15  --  16  --   --  16   < >  --  18   < > 15   CR  --   --   --   --  0.43*  --  0.46*  --   --  0.42*   < >  --  0.41*   < > 0.42*   ANIONGAP  --   --   --   --  1*  --  2*  --   --  4   < >  --  4   < > 3   HI  --   --   --   --  7.8*  --  7.7*  --   --  7.7*   < >  --  7.9*   < > 8.0*   * 119* 95   < > 111*   < > 123*   < >  --  174*   < >  --  177*   < > 158*   ALBUMIN  --   --   --   --   --   --   --   --   --  2.2*  --   --  2.1*   < > 2.1*   PROTTOTAL  --   --   --   --   --   --   --   --   --  6.1*  --   --   6.1*   < > 6.4*   BILITOTAL  --   --   --   --   --   --   --   --   --  0.4  --   --  0.3   < > 0.2   ALKPHOS  --   --   --   --   --   --   --   --   --  53  --   --  50   < > 55   ALT  --   --   --   --   --   --   --   --   --  54*  --   --  56*   < > 65*   AST  --   --   --   --   --   --   --   --   --  37  --   --  53*   < > 73*   TROPONIN  --   --   --   --   --   --  <0.015  --   --   --   --   --  0.016  --  <0.015    < > = values in this interval not displayed.     Blood sugars ranged  over the last day    Medications       cholecalciferol  125 mcg Oral Daily     [START ON 11/5/2021] dexamethasone  6 mg Oral Daily     enoxaparin ANTICOAGULANT  0.5 mg/kg Subcutaneous BID     insulin aspart  1-7 Units Subcutaneous TID AC     insulin aspart  1-5 Units Subcutaneous At Bedtime     senna-docusate  1 tablet Oral At Bedtime     sodium chloride (PF)  10-40 mL Intracatheter Q8H     vitamin C  1,000 mg Oral Daily     zinc sulfate  220 mg Oral Daily

## 2021-11-05 LAB
GLUCOSE BLDC GLUCOMTR-MCNC: 136 MG/DL (ref 70–99)
GLUCOSE BLDC GLUCOMTR-MCNC: 150 MG/DL (ref 70–99)
GLUCOSE BLDC GLUCOMTR-MCNC: 164 MG/DL (ref 70–99)
GLUCOSE BLDC GLUCOMTR-MCNC: 171 MG/DL (ref 70–99)

## 2021-11-05 PROCEDURE — 99233 SBSQ HOSP IP/OBS HIGH 50: CPT | Performed by: INTERNAL MEDICINE

## 2021-11-05 PROCEDURE — 250N000012 HC RX MED GY IP 250 OP 636 PS 637: Performed by: PEDIATRICS

## 2021-11-05 PROCEDURE — 120N000001 HC R&B MED SURG/OB

## 2021-11-05 PROCEDURE — 250N000011 HC RX IP 250 OP 636: Performed by: PEDIATRICS

## 2021-11-05 PROCEDURE — 999N000123 HC STATISTIC OXYGEN O2DAILY TECH TIME

## 2021-11-05 PROCEDURE — 250N000013 HC RX MED GY IP 250 OP 250 PS 637: Performed by: PEDIATRICS

## 2021-11-05 RX ADMIN — Medication 125 MCG: at 08:08

## 2021-11-05 RX ADMIN — Medication 220 MG: at 08:08

## 2021-11-05 RX ADMIN — DEXAMETHASONE 6 MG: 2 TABLET ORAL at 08:08

## 2021-11-05 RX ADMIN — OXYCODONE HYDROCHLORIDE AND ACETAMINOPHEN 1000 MG: 500 TABLET ORAL at 08:08

## 2021-11-05 RX ADMIN — ENOXAPARIN SODIUM 40 MG: 40 INJECTION SUBCUTANEOUS at 08:08

## 2021-11-05 ASSESSMENT — ACTIVITIES OF DAILY LIVING (ADL)
ADLS_ACUITY_SCORE: 8
ADLS_ACUITY_SCORE: 8
ADLS_ACUITY_SCORE: 10
ADLS_ACUITY_SCORE: 8
ADLS_ACUITY_SCORE: 10
ADLS_ACUITY_SCORE: 8
ADLS_ACUITY_SCORE: 10
ADLS_ACUITY_SCORE: 8
ADLS_ACUITY_SCORE: 10
ADLS_ACUITY_SCORE: 8

## 2021-11-05 NOTE — PROGRESS NOTES
[facility]    Medicine Progress Note - Hospitalist Service       Date of Admission:  10/29/2021    Assessment & Plan               Janiya Samuels is a 51 year old female admitted on 10/29/2021 with rapidly progressing respiratory failure from COVID-19 pneumonia on approximately day 8-9 of illness necessitating initiation of BiPAP treatment in the ICU.  She completed treatment with remdesivir, continues dexamethasone and received a dose of Actemra on 10/30/21.  Oxygenation has significantly improved over the last 24 hours such that she has now been able to wean to regular nasal cannula supplementation this morning.  She has a new diagnosis of prediabetes during this hospitalization with fluctuating glycemic control including hyperglycemia likely exacerbated by corticosteroids, but overall her hyperglycemia is much improved and she has only needed 1 dose of 1 unit of NovoLog insulin by correction scale in the last 24 hours.  Initial mild elevation in hepatic transaminases has improved.        11/5 :     Day 6 of hospitalization  Sob improving slowly  On 7 liters of oxygen  Continue dexamethasone, completed 5 days of remdesivir      A/p :       Acute hypoxic respiratory failure : on 7 liters of oxygen      COVID Pneumonia : Continue dexamethasone, completed 5 days of remdesivir            Principal Problem:    Pneumonia due to 2019 novel coronavirus  Active Problems:    Acute respiratory failure with hypoxia (H)    Transaminitis    Prediabetes-A1c 6.2         Diet: Consistent Carb 60 grams CHO per Meal Diet    DVT Prophylaxis: Enoxaparin (Lovenox) SQ  Nguyen Catheter: Not present  Central Lines: None  Code Status: Full Code      Disposition Plan   Expected discharge: 2 to 3 days recommended to prior living arrangement with probable need for home oxygen supplementation once O2 use less than 2-3 liters/minute for at least 24 hours.     The patient's care was discussed with the Bedside Nurse,  Care Coordinator/ and Patient.    Cesar Santamaria MD  Hospitalist Service  [facility]  Securely message with the [employer] Web Console (learn more here)  Text page via [employer] Paging/Directory        Clinically Significant Risk Factors Present on Admission                ______________________________________________________________________    Data reviewed today: I reviewed all medications, new labs and imaging results over the last 24 hours. I personally reviewed no images or EKG's today.    Physical Exam   Vital Signs: Temp: 97.5  F (36.4  C) Temp src: Oral BP: 100/60 Pulse: 79   Resp: 20 SpO2: (!) 84 % (with activitiy) O2 Device: Nasal cannula Oxygen Delivery: 7 LPM  Weight: 184 lbs 14.4 oz   Vitals:    10/29/21 1211 10/30/21 1317 11/01/21 0400 11/02/21 0600   Weight: 88.5 kg (195 lb) 90.3 kg (199 lb) 89.8 kg (198 lb) 89 kg (196 lb 1.6 oz)    11/03/21 0900   Weight: 83.9 kg (184 lb 14.4 oz)     General Appearance: Appears to be breathing comfortably while resting in bed, no signs of distress  Respiratory: Normal respiratory effort, diminished breath sounds, clear lung fields  Cardiovascular: Regular rate and rhythm, good radial pulse, brisk capillary refill, no peripheral edema     Data   Recent Labs   Lab 11/05/21  1121 11/05/21  0814 11/04/21  2134 11/03/21  0822 11/03/21  0535 11/02/21  0755 11/02/21  0637 11/01/21  0837 11/01/21  0427 11/01/21  0426 10/31/21  0816 10/31/21  0528 10/31/21  0528 10/30/21  1447 10/30/21  1149   WBC  --   --   --   --  6.2  --  5.7  --   --  5.3   < >  --  3.7*   < >  --    HGB  --   --   --   --  13.2  --  12.9  --   --  12.8   < >  --  12.8   < >  --    MCV  --   --   --   --  87  --  87  --   --  87   < >  --  88   < >  --    PLT  --   --   --   --  493*  --  463*  --   --  424   < >  --  379   < >  --    INR  --   --   --   --  1.11  --  1.12  --  1.15  --   --    < > 1.10   < >  --    NA  --   --   --   --  139  --  137  --   --  138    < >  --  139   < > 138   POTASSIUM  --   --   --   --  3.7  --  3.9  --   --  4.1   < >  --  4.2   < > 4.2   CHLORIDE  --   --   --   --  108  --  106  --   --  108   < >  --  110*   < > 109   CO2  --   --   --   --  30  --  29  --   --  26   < >  --  25   < > 26   BUN  --   --   --   --  15  --  16  --   --  16   < >  --  18   < > 15   CR  --   --   --   --  0.43*  --  0.46*  --   --  0.42*   < >  --  0.41*   < > 0.42*   ANIONGAP  --   --   --   --  1*  --  2*  --   --  4   < >  --  4   < > 3   HI  --   --   --   --  7.8*  --  7.7*  --   --  7.7*   < >  --  7.9*   < > 8.0*   * 136* 118*   < > 111*   < > 123*   < >  --  174*   < >  --  177*   < > 158*   ALBUMIN  --   --   --   --   --   --   --   --   --  2.2*  --   --  2.1*   < > 2.1*   PROTTOTAL  --   --   --   --   --   --   --   --   --  6.1*  --   --  6.1*   < > 6.4*   BILITOTAL  --   --   --   --   --   --   --   --   --  0.4  --   --  0.3   < > 0.2   ALKPHOS  --   --   --   --   --   --   --   --   --  53  --   --  50   < > 55   ALT  --   --   --   --   --   --   --   --   --  54*  --   --  56*   < > 65*   AST  --   --   --   --   --   --   --   --   --  37  --   --  53*   < > 73*   TROPONIN  --   --   --   --   --   --  <0.015  --   --   --   --   --  0.016  --  <0.015    < > = values in this interval not displayed.     Blood sugars ranged  over the last day    Medications       cholecalciferol  125 mcg Oral Daily     dexamethasone  6 mg Oral Daily     enoxaparin ANTICOAGULANT  0.5 mg/kg Subcutaneous Q24H     senna-docusate  1 tablet Oral At Bedtime     vitamin C  1,000 mg Oral Daily     zinc sulfate  220 mg Oral Daily

## 2021-11-05 NOTE — PLAN OF CARE
Pt had been low 90's on 6L nasal cannula most of the night but needed to be changed to oxymask 8L for sats consistently in the 86-88% range. Pt denies pain or SOB. Pt has had one bowel movement on shift. RLL and LLL continue to sound diminished. Pt transfers to beside commode via standby assist but destats to mid 80's on activity. Pts appetite is still poor.

## 2021-11-05 NOTE — PLAN OF CARE
Patient weaned to 4L via NC with sats at 92-93%. Up to chair throughout the day as well as ambulation to bathroom with standby assist. Minimal desaturation with activity. Reports she is feeling better today. Voiding well. Oral intake has been adequate with minimal food intake. Reports that she is not feeling very hungry.

## 2021-11-05 NOTE — PROGRESS NOTES
SPIRITUAL HEALTH SERVICES  [facility]  Progress Note     REFERRAL SOURCE: Self     NOTE: Janiya has been hospitalized w/COVID for 5 days now.  As chaplains in the system aren't allowed to visit patients in person who have this diagnosis, I can't visit her.  Because she is on high flow oxygen & desats into the mid-80's, it doesn't seem like a good idea to phone her yet, either.  She didn't report a Zoroastrian preference on admission.      PLAN: I will continue to follow patient and be available for electronic visits, as she improves, until her discharge.     Johnathon Kolb, Ph.d,   Spiritual Health Services  [address] [place] [address]     Office: [phone]   [email or web address]

## 2021-11-06 LAB
GLUCOSE BLDC GLUCOMTR-MCNC: 116 MG/DL (ref 70–99)
GLUCOSE BLDC GLUCOMTR-MCNC: 123 MG/DL (ref 70–99)
GLUCOSE BLDC GLUCOMTR-MCNC: 128 MG/DL (ref 70–99)
GLUCOSE BLDC GLUCOMTR-MCNC: 256 MG/DL (ref 70–99)

## 2021-11-06 PROCEDURE — 250N000011 HC RX IP 250 OP 636: Performed by: PEDIATRICS

## 2021-11-06 PROCEDURE — 250N000013 HC RX MED GY IP 250 OP 250 PS 637: Performed by: PEDIATRICS

## 2021-11-06 PROCEDURE — 99207 PR CDG-MDM COMPONENT: MEETS HIGH - UP CODED: CPT | Performed by: INTERNAL MEDICINE

## 2021-11-06 PROCEDURE — 250N000012 HC RX MED GY IP 250 OP 636 PS 637: Performed by: PEDIATRICS

## 2021-11-06 PROCEDURE — 999N000123 HC STATISTIC OXYGEN O2DAILY TECH TIME

## 2021-11-06 PROCEDURE — 120N000001 HC R&B MED SURG/OB

## 2021-11-06 PROCEDURE — 99233 SBSQ HOSP IP/OBS HIGH 50: CPT | Performed by: INTERNAL MEDICINE

## 2021-11-06 RX ADMIN — DEXAMETHASONE 6 MG: 2 TABLET ORAL at 10:11

## 2021-11-06 RX ADMIN — ENOXAPARIN SODIUM 40 MG: 40 INJECTION SUBCUTANEOUS at 10:10

## 2021-11-06 RX ADMIN — Medication 220 MG: at 10:11

## 2021-11-06 RX ADMIN — SENNOSIDES AND DOCUSATE SODIUM 1 TABLET: 8.6; 5 TABLET ORAL at 20:17

## 2021-11-06 RX ADMIN — OXYCODONE HYDROCHLORIDE AND ACETAMINOPHEN 1000 MG: 500 TABLET ORAL at 10:10

## 2021-11-06 RX ADMIN — Medication 125 MCG: at 10:11

## 2021-11-06 ASSESSMENT — ACTIVITIES OF DAILY LIVING (ADL)
ADLS_ACUITY_SCORE: 10
ADLS_ACUITY_SCORE: 8
ADLS_ACUITY_SCORE: 10
ADLS_ACUITY_SCORE: 7
ADLS_ACUITY_SCORE: 10
ADLS_ACUITY_SCORE: 8
ADLS_ACUITY_SCORE: 7
ADLS_ACUITY_SCORE: 10
ADLS_ACUITY_SCORE: 7
ADLS_ACUITY_SCORE: 7
ADLS_ACUITY_SCORE: 10
ADLS_ACUITY_SCORE: 10
ADLS_ACUITY_SCORE: 7
ADLS_ACUITY_SCORE: 10
ADLS_ACUITY_SCORE: 8
ADLS_ACUITY_SCORE: 7
ADLS_ACUITY_SCORE: 10
ADLS_ACUITY_SCORE: 7
ADLS_ACUITY_SCORE: 8
ADLS_ACUITY_SCORE: 7
ADLS_ACUITY_SCORE: 8
ADLS_ACUITY_SCORE: 10

## 2021-11-06 NOTE — PLAN OF CARE
Problem: Adult Inpatient Plan of Care  Goal: Plan of Care Review  Outcome: No Change  Goal: Patient-Specific Goal (Individualized)  Outcome: No Change  Goal: Absence of Hospital-Acquired Illness or Injury  Outcome: No Change  Intervention: Prevent and Manage VTE (Venous Thromboembolism) Risk  Recent Flowsheet Documentation  Taken 11/6/2021 0015 by Sampson Suarez RN  VTE Prevention/Management: anticoagulant therapy maintained  Goal: Optimal Comfort and Wellbeing  Outcome: No Change  Goal: Readiness for Transition of Care  Outcome: No Change     Problem: Gas Exchange Impaired  Goal: Optimal Gas Exchange  Outcome: No Change

## 2021-11-06 NOTE — PROGRESS NOTES
[facility]    Medicine Progress Note - Hospitalist Service       Date of Admission:  10/29/2021    Assessment & Plan               Janiya Samuels is a 51 year old female admitted on 10/29/2021 with rapidly progressing respiratory failure from COVID-19 pneumonia on approximately day 8-9 of illness necessitating initiation of BiPAP treatment in the ICU.  She completed treatment with remdesivir, continues dexamethasone and received a dose of Actemra on 10/30/21.  Oxygenation has significantly improved over the last 24 hours such that she has now been able to wean to regular nasal cannula supplementation this morning.  She has a new diagnosis of prediabetes during this hospitalization with fluctuating glycemic control including hyperglycemia likely exacerbated by corticosteroids, but overall her hyperglycemia is much improved and she has only needed 1 dose of 1 unit of NovoLog insulin by correction scale in the last 24 hours.  Initial mild elevation in hepatic transaminases has improved.        11/6 :     Day 7 of hospitalization  Sob improving slowly  On 3 liters of oxygen  Continue dexamethasone, completed 5 days of remdesivir    Possible discharge in 1-2 days      A/p :       Acute hypoxic respiratory failure : on 7 liters of oxygen      COVID Pneumonia : Continue dexamethasone, completed 5 days of remdesivir            Principal Problem:    Pneumonia due to 2019 novel coronavirus  Active Problems:    Acute respiratory failure with hypoxia (H)    Transaminitis    Prediabetes-A1c 6.2         Diet: Consistent Carb 60 grams CHO per Meal Diet    DVT Prophylaxis: Enoxaparin (Lovenox) SQ  Nguyen Catheter: Not present  Central Lines: None  Code Status: Full Code      Disposition Plan   Expected discharge: 2 to 3 days recommended to prior living arrangement with probable need for home oxygen supplementation once O2 use less than 2-3 liters/minute for at least 24 hours.     The patient's care was  discussed with the Bedside Nurse, Care Coordinator/ and Patient.    Ceasr Santamaria MD  Hospitalist Service  [facility]  Securely message with the Vocera Web Console (learn more here)  Text page via [employer] Paging/Directory        Clinically Significant Risk Factors Present on Admission                ______________________________________________________________________    Data reviewed today: I reviewed all medications, new labs and imaging results over the last 24 hours. I personally reviewed no images or EKG's today.    Physical Exam   Vital Signs: Temp: 97.5  F (36.4  C) Temp src: Oral BP: 116/70 Pulse: 58   Resp: 18 SpO2: 93 % O2 Device: Nasal cannula Oxygen Delivery: 3 LPM  Weight: 184 lbs 14.4 oz   Vitals:    10/29/21 1211 10/30/21 1317 11/01/21 0400 11/02/21 0600   Weight: 88.5 kg (195 lb) 90.3 kg (199 lb) 89.8 kg (198 lb) 89 kg (196 lb 1.6 oz)    11/03/21 0900   Weight: 83.9 kg (184 lb 14.4 oz)     General Appearance: Appears to be breathing comfortably while resting in bed, no signs of distress  Respiratory: Normal respiratory effort, diminished breath sounds, clear lung fields  Cardiovascular: Regular rate and rhythm, good radial pulse, brisk capillary refill, no peripheral edema     Data   Recent Labs   Lab 11/06/21  1644 11/06/21  1151 11/06/21  0739 11/03/21  0822 11/03/21  0535 11/02/21  0755 11/02/21  0637 11/01/21  0837 11/01/21  0427 11/01/21  0426 10/31/21  0816 10/31/21  0528 10/31/21  0528   WBC  --   --   --   --  6.2  --  5.7  --   --  5.3   < >  --  3.7*   HGB  --   --   --   --  13.2  --  12.9  --   --  12.8   < >  --  12.8   MCV  --   --   --   --  87  --  87  --   --  87   < >  --  88   PLT  --   --   --   --  493*  --  463*  --   --  424   < >  --  379   INR  --   --   --   --  1.11  --  1.12  --  1.15  --   --    < > 1.10   NA  --   --   --   --  139  --  137  --   --  138   < >  --  139   POTASSIUM  --   --   --   --  3.7  --  3.9  --   --  4.1    < >  --  4.2   CHLORIDE  --   --   --   --  108  --  106  --   --  108   < >  --  110*   CO2  --   --   --   --  30  --  29  --   --  26   < >  --  25   BUN  --   --   --   --  15  --  16  --   --  16   < >  --  18   CR  --   --   --   --  0.43*  --  0.46*  --   --  0.42*   < >  --  0.41*   ANIONGAP  --   --   --   --  1*  --  2*  --   --  4   < >  --  4   HI  --   --   --   --  7.8*  --  7.7*  --   --  7.7*   < >  --  7.9*   * 128* 116*   < > 111*   < > 123*   < >  --  174*   < >  --  177*   ALBUMIN  --   --   --   --   --   --   --   --   --  2.2*  --   --  2.1*   PROTTOTAL  --   --   --   --   --   --   --   --   --  6.1*  --   --  6.1*   BILITOTAL  --   --   --   --   --   --   --   --   --  0.4  --   --  0.3   ALKPHOS  --   --   --   --   --   --   --   --   --  53  --   --  50   ALT  --   --   --   --   --   --   --   --   --  54*  --   --  56*   AST  --   --   --   --   --   --   --   --   --  37  --   --  53*   TROPONIN  --   --   --   --   --   --  <0.015  --   --   --   --   --  0.016    < > = values in this interval not displayed.     Blood sugars ranged  over the last day    Medications       cholecalciferol  125 mcg Oral Daily     dexamethasone  6 mg Oral Daily     enoxaparin ANTICOAGULANT  0.5 mg/kg Subcutaneous Q24H     senna-docusate  1 tablet Oral At Bedtime     vitamin C  1,000 mg Oral Daily     zinc sulfate  220 mg Oral Daily

## 2021-11-06 NOTE — PLAN OF CARE
Requiring 3L NC oxygen to maintain sats greater than 90%. Down to 88% with activity, but recovered quickly at resting. Will continue with POC.

## 2021-11-06 NOTE — PROGRESS NOTES
"S-(situation): End of shift note    B-(background): Covid+    A-(assessment): Pt currently on 4L O2, needed 6L part of this shift.  Lung sounds diminished. Most recent blood sugar is 150.  No other acute changes this shift    R-(recommendations): Continue to wean O2 as tolerated, encourage PO intake,    /66 (BP Location: Left arm, Patient Position: Semi-Peralta's)   Pulse 59   Temp 97.9  F (36.6  C) (Oral)   Resp 20   Ht 1.727 m (5' 8\")   Wt 83.9 kg (184 lb 14.4 oz)   LMP 10/26/2021 (Exact Date)   SpO2 96%   BMI 28.11 kg/m    "

## 2021-11-07 LAB
GLUCOSE BLDC GLUCOMTR-MCNC: 134 MG/DL (ref 70–99)
GLUCOSE BLDC GLUCOMTR-MCNC: 148 MG/DL (ref 70–99)
GLUCOSE BLDC GLUCOMTR-MCNC: 149 MG/DL (ref 70–99)
GLUCOSE BLDC GLUCOMTR-MCNC: 182 MG/DL (ref 70–99)
GLUCOSE BLDC GLUCOMTR-MCNC: 99 MG/DL (ref 70–99)

## 2021-11-07 PROCEDURE — 250N000013 HC RX MED GY IP 250 OP 250 PS 637: Performed by: PEDIATRICS

## 2021-11-07 PROCEDURE — 99233 SBSQ HOSP IP/OBS HIGH 50: CPT | Performed by: INTERNAL MEDICINE

## 2021-11-07 PROCEDURE — 250N000011 HC RX IP 250 OP 636: Performed by: PEDIATRICS

## 2021-11-07 PROCEDURE — 250N000012 HC RX MED GY IP 250 OP 636 PS 637: Performed by: PEDIATRICS

## 2021-11-07 PROCEDURE — 120N000001 HC R&B MED SURG/OB

## 2021-11-07 RX ADMIN — OXYCODONE HYDROCHLORIDE AND ACETAMINOPHEN 1000 MG: 500 TABLET ORAL at 08:50

## 2021-11-07 RX ADMIN — Medication 220 MG: at 08:51

## 2021-11-07 RX ADMIN — LORAZEPAM 0.5 MG: 2 INJECTION INTRAMUSCULAR; INTRAVENOUS at 01:36

## 2021-11-07 RX ADMIN — Medication 125 MCG: at 08:50

## 2021-11-07 RX ADMIN — ENOXAPARIN SODIUM 40 MG: 40 INJECTION SUBCUTANEOUS at 08:59

## 2021-11-07 RX ADMIN — DEXAMETHASONE 6 MG: 2 TABLET ORAL at 08:50

## 2021-11-07 ASSESSMENT — ACTIVITIES OF DAILY LIVING (ADL)
ADLS_ACUITY_SCORE: 7

## 2021-11-07 NOTE — PLAN OF CARE
Problem: Adult Inpatient Plan of Care  Goal: Plan of Care Review  Outcome: No Change  Goal: Patient-Specific Goal (Individualized)  Outcome: No Change  Goal: Optimal Comfort and Wellbeing  Outcome: No Change  Goal: Readiness for Transition of Care  Outcome: No Change     Problem: Gas Exchange Impaired  Goal: Optimal Gas Exchange  Outcome: No Change  Intervention: Optimize Oxygenation and Ventilation  Recent Flowsheet Documentation  Taken 11/7/2021 0000 by Sampson Suarez RN  Head of Bed (HOB) Positioning: HOB at 30-45 degrees     Problem: Adult Inpatient Plan of Care  Goal: Absence of Hospital-Acquired Illness or Injury  Outcome: Improving  Intervention: Identify and Manage Fall Risk  Recent Flowsheet Documentation  Taken 11/7/2021 0000 by Sampson Suarez RN  Safety Promotion/Fall Prevention:   clutter free environment maintained   fall prevention program maintained  Intervention: Prevent Skin Injury  Recent Flowsheet Documentation  Taken 11/7/2021 0000 by Sampson Suarez RN  Body Position: position changed independently  Intervention: Prevent and Manage VTE (Venous Thromboembolism) Risk  Recent Flowsheet Documentation  Taken 11/7/2021 0000 by Sampson Suarez RN  VTE Prevention/Management: anticoagulant therapy maintained

## 2021-11-07 NOTE — PROGRESS NOTES
Pt ate 75% of breakfast, no pain or nausea. Continues on 6L nasal cannula, desats with activity. Pt needed to be reminded to keep O2 on while going to the bathroom, she was taking it off with ambulation. Extension tubing brought in and pt has kept cannula on since.

## 2021-11-07 NOTE — PROGRESS NOTES
formerly [facility]    Medicine Progress Note - Hospitalist Service       Date of Admission:  10/29/2021    Assessment & Plan               Janiya Samuels is a 51 year old female admitted on 10/29/2021 with rapidly progressing respiratory failure from COVID-19 pneumonia on approximately day 8-9 of illness necessitating initiation of BiPAP treatment in the ICU.  She completed treatment with remdesivir, continues dexamethasone and received a dose of Actemra on 10/30/21.  Oxygenation has significantly improved over the last 24 hours such that she has now been able to wean to regular nasal cannula supplementation this morning.  She has a new diagnosis of prediabetes during this hospitalization with fluctuating glycemic control including hyperglycemia likely exacerbated by corticosteroids, but overall her hyperglycemia is much improved and she has only needed 1 dose of 1 unit of NovoLog insulin by correction scale in the last 24 hours.  Initial mild elevation in hepatic transaminases has improved.        11/7 :       Day 8 of hospitalization  Sob improving slowly  On 6 liters of oxygen  Continue dexamethasone, completed 5 days of remdesivir    Possible discharge in 1-2 days      A/p :       Acute hypoxic respiratory failure : on 7 liters of oxygen      COVID Pneumonia : Continue dexamethasone, completed 5 days of remdesivir            Principal Problem:    Pneumonia due to 2019 novel coronavirus  Active Problems:    Acute respiratory failure with hypoxia (H)    Transaminitis    Prediabetes-A1c 6.2         Diet: Consistent Carb 60 grams CHO per Meal Diet    DVT Prophylaxis: Enoxaparin (Lovenox) SQ  Nguyen Catheter: Not present  Central Lines: None  Code Status: Full Code      Disposition Plan   Expected discharge: 2 to 3 days recommended to prior living arrangement with probable need for home oxygen supplementation once O2 use less than 2-3 liters/minute for at least 24 hours.     The patient's care  was discussed with the Bedside Nurse, Care Coordinator/ and Patient.    Cesar Santamaria MD  Hospitalist Service  [facility]  Securely message with the Vocera Web Console (learn more here)  Text page via [employer] Paging/Directory        Clinically Significant Risk Factors Present on Admission                ______________________________________________________________________    Data reviewed today: I reviewed all medications, new labs and imaging results over the last 24 hours. I personally reviewed no images or EKG's today.    Physical Exam   Vital Signs: Temp: 97.8  F (36.6  C) Temp src: Oral BP: 109/68 Pulse: 80   Resp: 16 SpO2: 93 % O2 Device: Nasal cannula Oxygen Delivery: 6 LPM  Weight: 184 lbs 14.4 oz   Vitals:    10/29/21 1211 10/30/21 1317 11/01/21 0400 11/02/21 0600   Weight: 88.5 kg (195 lb) 90.3 kg (199 lb) 89.8 kg (198 lb) 89 kg (196 lb 1.6 oz)    11/03/21 0900   Weight: 83.9 kg (184 lb 14.4 oz)     General Appearance: Appears to be breathing comfortably while resting in bed, no signs of distress  Respiratory: Normal respiratory effort, diminished breath sounds, clear lung fields  Cardiovascular: Regular rate and rhythm, good radial pulse, brisk capillary refill, no peripheral edema     Data   Recent Labs   Lab 11/07/21  1646 11/07/21  1125 11/07/21  0745 11/03/21  0822 11/03/21  0535 11/02/21  0755 11/02/21  0637 11/01/21  0837 11/01/21  0427 11/01/21  0426   WBC  --   --   --   --  6.2  --  5.7  --   --  5.3   HGB  --   --   --   --  13.2  --  12.9  --   --  12.8   MCV  --   --   --   --  87  --  87  --   --  87   PLT  --   --   --   --  493*  --  463*  --   --  424   INR  --   --   --   --  1.11  --  1.12  --  1.15  --    NA  --   --   --   --  139  --  137  --   --  138   POTASSIUM  --   --   --   --  3.7  --  3.9  --   --  4.1   CHLORIDE  --   --   --   --  108  --  106  --   --  108   CO2  --   --   --   --  30  --  29  --   --  26   BUN  --   --   --   --   15  --  16  --   --  16   CR  --   --   --   --  0.43*  --  0.46*  --   --  0.42*   ANIONGAP  --   --   --   --  1*  --  2*  --   --  4   HI  --   --   --   --  7.8*  --  7.7*  --   --  7.7*   * 134* 99   < > 111*   < > 123*   < >  --  174*   ALBUMIN  --   --   --   --   --   --   --   --   --  2.2*   PROTTOTAL  --   --   --   --   --   --   --   --   --  6.1*   BILITOTAL  --   --   --   --   --   --   --   --   --  0.4   ALKPHOS  --   --   --   --   --   --   --   --   --  53   ALT  --   --   --   --   --   --   --   --   --  54*   AST  --   --   --   --   --   --   --   --   --  37   TROPONIN  --   --   --   --   --   --  <0.015  --   --   --     < > = values in this interval not displayed.     Blood sugars ranged  over the last day    Medications       cholecalciferol  125 mcg Oral Daily     enoxaparin ANTICOAGULANT  0.5 mg/kg Subcutaneous Q24H     senna-docusate  1 tablet Oral At Bedtime     vitamin C  1,000 mg Oral Daily     zinc sulfate  220 mg Oral Daily

## 2021-11-07 NOTE — PLAN OF CARE
Pt has maintained O2 sats >90% this shift with ambulation and on 6L nasal cannula. Pt ambulates independently in the room, extension tubing brought in so pt does not remove nasal cannula. Has good appetite, calling appropriately. Continuing to monitor.

## 2021-11-07 NOTE — PROGRESS NOTES
"S-(situation): End of shift note    B-(background): Covid    A-(assessment): SPO2 3-6 L this shift, switched to oxymask due to mouth breathing.  Appeared very anxious this shift, 0.5mg Ativan IV given with good effect.  Pt desat with bathroom ambulation, O2 much better when pronned this shift.  Last .    R-(recommendations): Wean O2, possible discharge today home on O2.    /77 (BP Location: Left arm)   Pulse 61   Temp 96.8  F (36  C) (Oral)   Resp 20   Ht 1.727 m (5' 8\")   Wt 83.9 kg (184 lb 14.4 oz)   LMP 10/26/2021 (Exact Date)   SpO2 96%   BMI 28.11 kg/m    "

## 2021-11-08 LAB
GLUCOSE BLDC GLUCOMTR-MCNC: 148 MG/DL (ref 70–99)
GLUCOSE BLDC GLUCOMTR-MCNC: 152 MG/DL (ref 70–99)
GLUCOSE BLDC GLUCOMTR-MCNC: 90 MG/DL (ref 70–99)
GLUCOSE BLDC GLUCOMTR-MCNC: 94 MG/DL (ref 70–99)
GLUCOSE BLDC GLUCOMTR-MCNC: 97 MG/DL (ref 70–99)
PLATELET # BLD AUTO: 508 10E3/UL (ref 150–450)

## 2021-11-08 PROCEDURE — 36415 COLL VENOUS BLD VENIPUNCTURE: CPT | Performed by: INTERNAL MEDICINE

## 2021-11-08 PROCEDURE — 250N000012 HC RX MED GY IP 250 OP 636 PS 637: Performed by: INTERNAL MEDICINE

## 2021-11-08 PROCEDURE — 99207 PR CDG-MDM COMPONENT: MEETS HIGH - UP CODED: CPT | Performed by: INTERNAL MEDICINE

## 2021-11-08 PROCEDURE — 250N000011 HC RX IP 250 OP 636: Performed by: PEDIATRICS

## 2021-11-08 PROCEDURE — 120N000001 HC R&B MED SURG/OB

## 2021-11-08 PROCEDURE — 85049 AUTOMATED PLATELET COUNT: CPT | Performed by: INTERNAL MEDICINE

## 2021-11-08 PROCEDURE — 250N000013 HC RX MED GY IP 250 OP 250 PS 637: Performed by: PEDIATRICS

## 2021-11-08 PROCEDURE — 99233 SBSQ HOSP IP/OBS HIGH 50: CPT | Performed by: INTERNAL MEDICINE

## 2021-11-08 RX ADMIN — OXYCODONE HYDROCHLORIDE AND ACETAMINOPHEN 1000 MG: 500 TABLET ORAL at 08:39

## 2021-11-08 RX ADMIN — ENOXAPARIN SODIUM 40 MG: 40 INJECTION SUBCUTANEOUS at 08:39

## 2021-11-08 RX ADMIN — DEXAMETHASONE 6 MG: 2 TABLET ORAL at 18:24

## 2021-11-08 RX ADMIN — Medication 220 MG: at 08:38

## 2021-11-08 RX ADMIN — Medication 125 MCG: at 08:39

## 2021-11-08 ASSESSMENT — ACTIVITIES OF DAILY LIVING (ADL)
ADLS_ACUITY_SCORE: 7

## 2021-11-08 NOTE — PROGRESS NOTES
Patient has been assessed for Home Oxygen needs.  Oxygen readings:   *   RA - at rest  Pulse oximetry SPO2 87 %  *   O2 at  3 liters/minute (at rest) ...SPO2 91 %  *   O2 at  6 liters/minute (during activity/with exercise) ...SPO2 89 %    RT Janel on 11/8/2021 at 3:41 PM

## 2021-11-08 NOTE — PROGRESS NOTES
"S-(situation): End of shift note    B-(background): Covid    A-(assessment): O2 >90% on 3L NC this shift.  Most recent .  Dry cough, dyspnea with exertion. Lungs diminished.   No anxiety this shift. Independent in room    R-(recommendations): Wean O2 as tolerated    BP 96/55 (BP Location: Left arm)   Pulse 61   Temp 98.2  F (36.8  C) (Oral)   Resp 18   Ht 1.727 m (5' 8\")   Wt 83.9 kg (184 lb 14.4 oz)   LMP 10/26/2021 (Exact Date)   SpO2 98%   BMI 28.11 kg/m    "

## 2021-11-08 NOTE — PROGRESS NOTES
"CLINICAL NUTRITION SERVICES - ASSESSMENT NOTE     Nutrition Prescription    RECOMMENDATIONS FOR MDs/PROVIDERS TO ORDER:  Updated weight as able     Malnutrition Status:    Unable to determine due to lack of filling all parameters for diagnosis     Recommendations already ordered by Registered Dietitian (RD):  None at this time    Future/Additional Recommendations:  Monitor PO intake and weight trends      REASON FOR ASSESSMENT  Janiya Samuels is a/an 51 year old female assessed by the dietitian for LOS    NUTRITION HISTORY  Per Chart Review:   -admitted on 10/29/2021 with rapidly progressing respiratory failure from COVID-19 pneumonia on approximately day 8-9 of illness.   -admitted into ICU, transferred to floor 11/3.   -Currently on 3 L O2.     Multiple unsuccessful attempts to reach patient by phone today (COVID+ precautions). All information obtained from chart review only. RD to continue to follow and obtain nutrition history as able/appropriate.     CURRENT NUTRITION ORDERS  Diet: Consistent Carb 60 grams CHO per Meal Diet    Intake/Tolerance: Patient consuming 0-75% of meals over the course of admission. Improved intake over the last 1-2 days.     LABS  Labs reviewed    MEDICATIONS  Medications reviewed  Vitamin D3  Vitamin C   Zinc     ANTHROPOMETRICS  Height: 172.7 cm (5' 8\")  Most Recent Weight: 83.9 kg (184 lb 14.4 oz)    IBW: 63.6 kg  BMI: Overweight BMI 25-29.9  Weight History:   -Admit weight of 88.5 kg on 10/29/21. Last updated weight from 11/3... question accuracy of most recent weight as there is a noted 12 lb weight loss in 1 day. Difficult to assess true weight trends at this time.    Wt Readings from Last 10 Encounters:   11/03/21 83.9 kg (184 lb 14.4 oz)   10/05/20 72.6 kg (160 lb)   09/29/20 86.2 kg (190 lb)   06/16/17 86.2 kg (190 lb)   05/18/15 85.1 kg (187 lb 11.2 oz)       Dosing Weight: 69 kg (adjusted)    ASSESSED NUTRITION NEEDS  Estimated Energy Needs: 7946-8597 kcals/day (25 - 30 " kcals/kg)  Justification: Maintenance  Estimated Protein Needs: 69-83 grams protein/day (1 - 1.2 grams of pro/kg)  Justification: Increased needs  Estimated Fluid Needs: 1 mL/kcal   Justification: Per provider pending fluid status    PHYSICAL FINDINGS  See malnutrition section below.    MALNUTRITION  % Intake: < 75% for > 7 days (moderate)  % Weight Loss: Unable to assess - question accuracy of most recent weight & most recent weight from 11/3.   Subcutaneous Fat Loss: Unable to assess - COVID + precautions   Muscle Loss: Unable to assess - COVID + precautions   Fluid Accumulation/Edema: None noted  Malnutrition Diagnosis: Patient does not meet two of the established criteria necessary for diagnosing malnutrition    NUTRITION DIAGNOSIS  Inadequate oral intake related to acute illness as evidenced by patient consuming less than 75% of meals for > 7 days       INTERVENTIONS  Implementation  Nutrition Education: Unable to complete due to unsuccessful attempt to reach patient via telephone today.    Collaboration with other providers - IDT rounds     Goals  Patient to consume % of nutritionally adequate meal trays TID, or the equivalent with supplements/snacks.     Monitoring/Evaluation  Progress toward goals will be monitored and evaluated per protocol.    Rodrigo Zhao RDN, LD  Clinical Dietitian   Office: [phone]  Weekend Pager: [phone]

## 2021-11-08 NOTE — PROGRESS NOTES
Pt has been ambulating independently in room this shift. Weaned down to 3L nasal cannula overnight, sats >90% while in bed but desats with activity. Pt encouraged to keep nasal cannula and O2 monitor on while using the bathroom. Good appetite, no pain or nausea.

## 2021-11-08 NOTE — PLAN OF CARE
Problem: Adult Inpatient Plan of Care  Goal: Plan of Care Review  Outcome: Improving  Goal: Patient-Specific Goal (Individualized)  Outcome: Improving  Goal: Absence of Hospital-Acquired Illness or Injury  Outcome: Improving  Intervention: Identify and Manage Fall Risk  Recent Flowsheet Documentation  Taken 11/7/2021 2358 by Sampson Suarez RN  Safety Promotion/Fall Prevention:   clutter free environment maintained   fall prevention program maintained  Intervention: Prevent Skin Injury  Recent Flowsheet Documentation  Taken 11/7/2021 2358 by Sampson Suarez RN  Body Position: position changed independently  Intervention: Prevent and Manage VTE (Venous Thromboembolism) Risk  Recent Flowsheet Documentation  Taken 11/7/2021 2358 by Sampson Suarez RN  VTE Prevention/Management: anticoagulant therapy maintained  Goal: Optimal Comfort and Wellbeing  Outcome: Improving  Goal: Readiness for Transition of Care  Outcome: Improving     Problem: Gas Exchange Impaired  Goal: Optimal Gas Exchange  Outcome: Improving  Intervention: Optimize Oxygenation and Ventilation  Recent Flowsheet Documentation  Taken 11/7/2021 2358 by Sampson Suarez RN  Head of Bed (HOB) Positioning: HOB at 20-30 degrees

## 2021-11-08 NOTE — PROGRESS NOTES
[facility]    Medicine Progress Note - Hospitalist Service       Date of Admission:  10/29/2021    Assessment & Plan               Janiya Samuels is a 51 year old female admitted on 10/29/2021 with rapidly progressing respiratory failure from COVID-19 pneumonia on approximately day 8-9 of illness necessitating initiation of BiPAP treatment in the ICU.  She completed treatment with remdesivir, continues dexamethasone and received a dose of Actemra on 10/30/21.  Oxygenation has significantly improved over the last 24 hours such that she has now been able to wean to regular nasal cannula supplementation this morning.  She has a new diagnosis of prediabetes during this hospitalization with fluctuating glycemic control including hyperglycemia likely exacerbated by corticosteroids, but overall her hyperglycemia is much improved and she has only needed 1 dose of 1 unit of NovoLog insulin by correction scale in the last 24 hours.  Initial mild elevation in hepatic transaminases has improved.        11/8 :         Sob stable  Home oxygen evaluation done today and needing 3 liters of oxygen at rest and 6 liters with activity and hence not ready for discharge.  Completed dexamethasone course,completed 5 days of remdesivir      Possible discharge in 1-2 days        A/p :       Acute hypoxic respiratory failure : 2/2 COVID pneumonia, on oxygen.      COVID Pneumonia : Continue dexamethasone, completed 5 days of remdesivir            Principal Problem:    Pneumonia due to 2019 novel coronavirus  Active Problems:    Acute respiratory failure with hypoxia (H)    Transaminitis    Prediabetes-A1c 6.2         Diet: Consistent Carb 60 grams CHO per Meal Diet    DVT Prophylaxis: Enoxaparin (Lovenox) SQ  Nguyen Catheter: Not present  Central Lines: None  Code Status: Full Code      Disposition Plan   Expected discharge: 2 to 3 days recommended to prior living arrangement with probable need for home oxygen  supplementation once O2 use less than 2-3 liters/minute for at least 24 hours.     The patient's care was discussed with the Bedside Nurse, Care Coordinator/ and Patient.    Cesar Santamaria MD  Hospitalist Service  [facility]  Securely message with the Vocera Web Console (learn more here)  Text page via [employer] Paging/Directory        Clinically Significant Risk Factors Present on Admission                ______________________________________________________________________    Data reviewed today: I reviewed all medications, new labs and imaging results over the last 24 hours. I personally reviewed no images or EKG's today.    Physical Exam   Vital Signs: Temp: 96.8  F (36  C) Temp src: Oral BP: 101/70 Pulse: 66   Resp: 20 SpO2: 93 % O2 Device: Nasal cannula Oxygen Delivery: 3 LPM  Weight: 184 lbs 14.4 oz   Vitals:    10/29/21 1211 10/30/21 1317 11/01/21 0400 11/02/21 0600   Weight: 88.5 kg (195 lb) 90.3 kg (199 lb) 89.8 kg (198 lb) 89 kg (196 lb 1.6 oz)    11/03/21 0900   Weight: 83.9 kg (184 lb 14.4 oz)     General Appearance: Appears to be breathing comfortably while resting in bed, no signs of distress  Respiratory: Normal respiratory effort, diminished breath sounds, clear lung fields  Cardiovascular: Regular rate and rhythm, good radial pulse, brisk capillary refill, no peripheral edema     Data   Recent Labs   Lab 11/08/21  1637 11/08/21  1108 11/08/21  0821 11/08/21  0623 11/03/21  0822 11/03/21  0535 11/02/21  0755 11/02/21  0637   WBC  --   --   --   --   --  6.2  --  5.7   HGB  --   --   --   --   --  13.2  --  12.9   MCV  --   --   --   --   --  87  --  87   PLT  --   --   --  508*  --  493*  --  463*   INR  --   --   --   --   --  1.11  --  1.12   NA  --   --   --   --   --  139  --  137   POTASSIUM  --   --   --   --   --  3.7  --  3.9   CHLORIDE  --   --   --   --   --  108  --  106   CO2  --   --   --   --   --  30  --  29   BUN  --   --   --   --   --  15   --  16   CR  --   --   --   --   --  0.43*  --  0.46*   ANIONGAP  --   --   --   --   --  1*  --  2*   HI  --   --   --   --   --  7.8*  --  7.7*   GLC 94 97 90  --    < > 111*   < > 123*   TROPONIN  --   --   --   --   --   --   --  <0.015    < > = values in this interval not displayed.     Blood sugars ranged  over the last day    Medications       cholecalciferol  125 mcg Oral Daily     enoxaparin ANTICOAGULANT  0.5 mg/kg Subcutaneous Q24H     senna-docusate  1 tablet Oral At Bedtime     vitamin C  1,000 mg Oral Daily     zinc sulfate  220 mg Oral Daily

## 2021-11-09 LAB
ANION GAP SERPL CALCULATED.3IONS-SCNC: 6 MMOL/L (ref 3–14)
BASOPHILS # BLD AUTO: 0 10E3/UL (ref 0–0.2)
BASOPHILS NFR BLD AUTO: 0 %
BUN SERPL-MCNC: 13 MG/DL (ref 7–30)
CALCIUM SERPL-MCNC: 8.5 MG/DL (ref 8.5–10.1)
CHLORIDE BLD-SCNC: 105 MMOL/L (ref 94–109)
CO2 SERPL-SCNC: 27 MMOL/L (ref 20–32)
CREAT SERPL-MCNC: 0.51 MG/DL (ref 0.52–1.04)
CRP SERPL-MCNC: 3.1 MG/L (ref 0–8)
EOSINOPHIL # BLD AUTO: 0 10E3/UL (ref 0–0.7)
EOSINOPHIL NFR BLD AUTO: 0 %
ERYTHROCYTE [DISTWIDTH] IN BLOOD BY AUTOMATED COUNT: 13.8 % (ref 10–15)
GFR SERPL CREATININE-BSD FRML MDRD: >90 ML/MIN/1.73M2
GLUCOSE BLD-MCNC: 131 MG/DL (ref 70–99)
GLUCOSE BLDC GLUCOMTR-MCNC: 117 MG/DL (ref 70–99)
GLUCOSE BLDC GLUCOMTR-MCNC: 130 MG/DL (ref 70–99)
GLUCOSE BLDC GLUCOMTR-MCNC: 138 MG/DL (ref 70–99)
GLUCOSE BLDC GLUCOMTR-MCNC: 146 MG/DL (ref 70–99)
HCT VFR BLD AUTO: 41.9 % (ref 35–47)
HGB BLD-MCNC: 14.1 G/DL (ref 11.7–15.7)
IMM GRANULOCYTES # BLD: 0.1 10E3/UL
IMM GRANULOCYTES NFR BLD: 1 %
LACTATE SERPL-SCNC: 1.1 MMOL/L (ref 0.7–2)
LYMPHOCYTES # BLD AUTO: 2.4 10E3/UL (ref 0.8–5.3)
LYMPHOCYTES NFR BLD AUTO: 18 %
MCH RBC QN AUTO: 29.6 PG (ref 26.5–33)
MCHC RBC AUTO-ENTMCNC: 33.7 G/DL (ref 31.5–36.5)
MCV RBC AUTO: 88 FL (ref 78–100)
MONOCYTES # BLD AUTO: 0.7 10E3/UL (ref 0–1.3)
MONOCYTES NFR BLD AUTO: 5 %
NEUTROPHILS # BLD AUTO: 9.9 10E3/UL (ref 1.6–8.3)
NEUTROPHILS NFR BLD AUTO: 76 %
NRBC # BLD AUTO: 0 10E3/UL
NRBC BLD AUTO-RTO: 0 /100
PLATELET # BLD AUTO: 469 10E3/UL (ref 150–450)
POTASSIUM BLD-SCNC: 3.9 MMOL/L (ref 3.4–5.3)
PROCALCITONIN SERPL-MCNC: <0.05 NG/ML
RBC # BLD AUTO: 4.77 10E6/UL (ref 3.8–5.2)
SODIUM SERPL-SCNC: 138 MMOL/L (ref 133–144)
WBC # BLD AUTO: 13.1 10E3/UL (ref 4–11)

## 2021-11-09 PROCEDURE — 250N000012 HC RX MED GY IP 250 OP 636 PS 637: Performed by: INTERNAL MEDICINE

## 2021-11-09 PROCEDURE — 120N000001 HC R&B MED SURG/OB

## 2021-11-09 PROCEDURE — 83605 ASSAY OF LACTIC ACID: CPT | Performed by: INTERNAL MEDICINE

## 2021-11-09 PROCEDURE — 80048 BASIC METABOLIC PNL TOTAL CA: CPT | Performed by: INTERNAL MEDICINE

## 2021-11-09 PROCEDURE — 250N000013 HC RX MED GY IP 250 OP 250 PS 637: Performed by: PEDIATRICS

## 2021-11-09 PROCEDURE — 85004 AUTOMATED DIFF WBC COUNT: CPT | Performed by: INTERNAL MEDICINE

## 2021-11-09 PROCEDURE — 86140 C-REACTIVE PROTEIN: CPT | Performed by: INTERNAL MEDICINE

## 2021-11-09 PROCEDURE — 36415 COLL VENOUS BLD VENIPUNCTURE: CPT | Performed by: INTERNAL MEDICINE

## 2021-11-09 PROCEDURE — 99233 SBSQ HOSP IP/OBS HIGH 50: CPT | Performed by: INTERNAL MEDICINE

## 2021-11-09 PROCEDURE — 84145 PROCALCITONIN (PCT): CPT | Performed by: INTERNAL MEDICINE

## 2021-11-09 PROCEDURE — 250N000011 HC RX IP 250 OP 636: Performed by: PEDIATRICS

## 2021-11-09 RX ADMIN — ENOXAPARIN SODIUM 40 MG: 40 INJECTION SUBCUTANEOUS at 08:19

## 2021-11-09 RX ADMIN — OXYCODONE HYDROCHLORIDE AND ACETAMINOPHEN 1000 MG: 500 TABLET ORAL at 08:19

## 2021-11-09 RX ADMIN — Medication 125 MCG: at 08:19

## 2021-11-09 RX ADMIN — DEXAMETHASONE 6 MG: 2 TABLET ORAL at 08:19

## 2021-11-09 RX ADMIN — Medication 220 MG: at 08:19

## 2021-11-09 ASSESSMENT — ACTIVITIES OF DAILY LIVING (ADL)
ADLS_ACUITY_SCORE: 7

## 2021-11-09 NOTE — PROGRESS NOTES
Pt ate breakfast in bed this morning, got up to chair with encouragement. Ambulates independently in room. Used IS with encouragement, continues on 6L nasal cannula with sats >90%.

## 2021-11-09 NOTE — PROGRESS NOTES
"S-(situation): Shift Note    B-(background): Covid    A-(assessment): On 6L NC, desats with ambulation, lung sounds diminished.  Dry cough.  PO steroids initiated.  Pt asked by RN to call when ambulating to restroom to monitor O2, pt is often non-compliant.  Flat affect.    R-(recommendations): Wean O2 as tolerated, encourage activity in room.      /66 (BP Location: Left arm)   Pulse 57   Temp 97  F (36.1  C) (Oral)   Resp 20   Ht 1.727 m (5' 8\")   Wt 83.9 kg (184 lb 14.4 oz)   LMP 10/26/2021 (Exact Date)   SpO2 92%   BMI 28.11 kg/m    "

## 2021-11-09 NOTE — CONSULTS
Consult placed for Get Well Loop.  Physician anticipated at 1400 huddle yesterday, that patient will be ready for discharge today.  Patient is NOT medically ready for discharge today.  Consult to be placed closer to patient's discharge for Get Well Loop.    TIANA Gonsalez  [facility] [phone]/ [facility] [phone]  Care Management

## 2021-11-09 NOTE — PLAN OF CARE
Problem: Adult Inpatient Plan of Care  Goal: Plan of Care Review  Outcome: No Change  Goal: Patient-Specific Goal (Individualized)  Outcome: No Change  Goal: Absence of Hospital-Acquired Illness or Injury  Outcome: No Change  Intervention: Identify and Manage Fall Risk  Recent Flowsheet Documentation  Taken 11/9/2021 0014 by Sampson Suarez RN  Safety Promotion/Fall Prevention:   clutter free environment maintained   fall prevention program maintained  Intervention: Prevent Skin Injury  Recent Flowsheet Documentation  Taken 11/9/2021 0014 by Sampson Suarez RN  Body Position: position changed independently  Goal: Optimal Comfort and Wellbeing  Outcome: No Change  Goal: Readiness for Transition of Care  Outcome: No Change     Problem: Gas Exchange Impaired  Goal: Optimal Gas Exchange  Outcome: No Change  Intervention: Optimize Oxygenation and Ventilation  Recent Flowsheet Documentation  Taken 11/9/2021 0014 by Sampson Suarez RN  Head of Bed (HOB) Positioning: HOB at 20-30 degrees

## 2021-11-09 NOTE — PLAN OF CARE
Pt was weaned to 3L nasal cannula last night, O2 sats >90% while in bed but desats with activity. 6-8L needed with ambulation to chair and bathroom. Dex PO started.

## 2021-11-10 VITALS
RESPIRATION RATE: 18 BRPM | HEART RATE: 80 BPM | TEMPERATURE: 98.2 F | SYSTOLIC BLOOD PRESSURE: 115 MMHG | OXYGEN SATURATION: 88 % | BODY MASS INDEX: 28.02 KG/M2 | DIASTOLIC BLOOD PRESSURE: 72 MMHG | WEIGHT: 184.9 LBS | HEIGHT: 68 IN

## 2021-11-10 LAB
GLUCOSE BLDC GLUCOMTR-MCNC: 105 MG/DL (ref 70–99)
GLUCOSE BLDC GLUCOMTR-MCNC: 111 MG/DL (ref 70–99)

## 2021-11-10 PROCEDURE — 99239 HOSP IP/OBS DSCHRG MGMT >30: CPT | Performed by: PEDIATRICS

## 2021-11-10 PROCEDURE — 99207 PR CDG-CODE CATEGORY CHANGED: CPT | Performed by: PEDIATRICS

## 2021-11-10 PROCEDURE — 250N000013 HC RX MED GY IP 250 OP 250 PS 637: Performed by: PEDIATRICS

## 2021-11-10 PROCEDURE — 250N000011 HC RX IP 250 OP 636: Performed by: PEDIATRICS

## 2021-11-10 RX ADMIN — ENOXAPARIN SODIUM 40 MG: 40 INJECTION SUBCUTANEOUS at 08:22

## 2021-11-10 RX ADMIN — OXYCODONE HYDROCHLORIDE AND ACETAMINOPHEN 1000 MG: 500 TABLET ORAL at 08:21

## 2021-11-10 RX ADMIN — Medication 220 MG: at 08:21

## 2021-11-10 RX ADMIN — Medication 125 MCG: at 08:21

## 2021-11-10 ASSESSMENT — ACTIVITIES OF DAILY LIVING (ADL)
ADLS_ACUITY_SCORE: 7
ADLS_ACUITY_SCORE: 5
ADLS_ACUITY_SCORE: 5
DEPENDENT_IADLS:: INDEPENDENT
ADLS_ACUITY_SCORE: 7
ADLS_ACUITY_SCORE: 7
ADLS_ACUITY_SCORE: 5
ADLS_ACUITY_SCORE: 7
ADLS_ACUITY_SCORE: 7
ADLS_ACUITY_SCORE: 5
ADLS_ACUITY_SCORE: 5
ADLS_ACUITY_SCORE: 7

## 2021-11-10 NOTE — PROGRESS NOTES
[facility]    Medicine Progress Note - Hospitalist Service       Date of Admission:  10/29/2021    Assessment & Plan               Janiya Samuels is a 51 year old female who is unvaccinated and without any significant past medical history.     She was  admitted on 10/29/2021 with rapidly progressing respiratory failure from COVID-19 pneumonia on approximately day 8-9 of illness necessitating initiation of BiPAP treatment in the ICU.  She completed treatment with remdesivir, continues dexamethasone and received a dose of Actemra on 10/30/21.  Respiratory status showed improvement and she was transitioned to 6 liters of oxygen through Nasal canula.    She has a new diagnosis of prediabetes during this hospitalization with fluctuating glycemic control including hyperglycemia likely exacerbated by corticosteroids, but overall her hyperglycemia is much improved.        11/9 :         Sob stable  Patient continues to have high oxygen needs over last several days and is Currently on 6 liters of oxygen  Home oxygen evaluation done 11/8 and needing 3 liters of oxygen at rest and 6 liters with activity and hence not ready for discharge.  Completed dexamethasone course,completed 5 days of remdesivir, 1 dose of actemra.  Can consider repeat chest imaging if oxygen requirement doesn't improve      Possible discharge once oxygen requirement < 4 liters and will likely need repeat home oxygen evaluation.        A/p :       Acute hypoxic respiratory failure : 2/2 COVID pneumonia, on 6 liters of oxygen.      COVID Pneumonia : completed 10 days of  dexamethasone, completed 5 days of remdesivir and 1 dose of actemra.            Principal Problem:    Pneumonia due to 2019 novel coronavirus  Active Problems:    Acute respiratory failure with hypoxia (H)    Transaminitis    Prediabetes-A1c 6.2         Diet: Consistent Carb 60 grams CHO per Meal Diet    DVT Prophylaxis: Enoxaparin (Lovenox) SQ  Nguyen  Catheter: Not present  Central Lines: None  Code Status: Full Code      Disposition Plan   Expected discharge: 2 to 3 days recommended to prior living arrangement with probable need for home oxygen supplementation once O2 use less than 2-3 liters/minute for at least 24 hours.     The patient's care was discussed with the Bedside Nurse, Care Coordinator/ and Patient.    Cesar Santamaria MD  Hospitalist Service  [facility]  Securely message with the Vocera Web Console (learn more here)  Text page via [employer] Paging/Directory        Clinically Significant Risk Factors Present on Admission                ______________________________________________________________________    Data reviewed today: I reviewed all medications, new labs and imaging results over the last 24 hours. I personally reviewed no images or EKG's today.    Physical Exam   Vital Signs: Temp: 97  F (36.1  C) Temp src: Oral BP: 120/68 Pulse: 74   Resp: 22 SpO2: 94 % O2 Device: Nasal cannula Oxygen Delivery: 6 LPM  Weight: 184 lbs 14.4 oz   Vitals:    10/29/21 1211 10/30/21 1317 11/01/21 0400 11/02/21 0600   Weight: 88.5 kg (195 lb) 90.3 kg (199 lb) 89.8 kg (198 lb) 89 kg (196 lb 1.6 oz)    11/03/21 0900   Weight: 83.9 kg (184 lb 14.4 oz)     General Appearance: Appears to be breathing comfortably while resting in bed, no signs of distress  Respiratory: Normal respiratory effort, diminished breath sounds, clear lung fields  Cardiovascular: Regular rate and rhythm, good radial pulse, brisk capillary refill, no peripheral edema     Data   Recent Labs   Lab 11/09/21  1651 11/09/21  1153 11/09/21  0909 11/08/21  0821 11/08/21  0623 11/03/21  0822 11/03/21  0535   WBC  --   --  13.1*  --   --   --  6.2   HGB  --   --  14.1  --   --   --  13.2   MCV  --   --  88  --   --   --  87   PLT  --   --  469*  --  508*  --  493*   INR  --   --   --   --   --   --  1.11   NA  --   --  138  --   --   --  139   POTASSIUM  --   --   3.9  --   --   --  3.7   CHLORIDE  --   --  105  --   --   --  108   CO2  --   --  27  --   --   --  30   BUN  --   --  13  --   --   --  15   CR  --   --  0.51*  --   --   --  0.43*   ANIONGAP  --   --  6  --   --   --  1*   HI  --   --  8.5  --   --   --  7.8*   * 130* 131*   < >  --    < > 111*    < > = values in this interval not displayed.     Blood sugars ranged  over the last day    Medications       cholecalciferol  125 mcg Oral Daily     enoxaparin ANTICOAGULANT  0.5 mg/kg Subcutaneous Q24H     senna-docusate  1 tablet Oral At Bedtime     vitamin C  1,000 mg Oral Daily     zinc sulfate  220 mg Oral Daily

## 2021-11-10 NOTE — PROGRESS NOTES
SPIRITUAL HEALTH SERVICES  [facility]  Progress Note     REFERRAL SOURCE: Self     NOTE: Janiya has been hospitalized w/COVID for 10 days now.  As chaplains in the system aren't allowed to visit patients in person who have this diagnosis, I can't visit her. Because she is still on high flow oxygen, it doesn't seem like a good idea to phone her yet, either. She didn't report a Confucianist preference on admission.      PLAN: I will continue to follow patient and be available for electronic visits, as she improves, until her discharge.     Johnathon Kolb, Ph.d,   Spiritual Health Services  formerly [facility]  [address] [place] [address]     Office: [phone]   [email or web address]

## 2021-11-10 NOTE — PLAN OF CARE
Problem: Adult Inpatient Plan of Care  Goal: Plan of Care Review  Outcome: Improving  Goal: Patient-Specific Goal (Individualized)  Outcome: Improving  Goal: Absence of Hospital-Acquired Illness or Injury  Outcome: Improving  Intervention: Identify and Manage Fall Risk  Recent Flowsheet Documentation  Taken 11/10/2021 0200 by Amrit Moore RN  Safety Promotion/Fall Prevention:   clutter free environment maintained   fall prevention program maintained   lighting adjusted   room organization consistent   safety round/check completed   supervised activity  Taken 11/9/2021 2000 by Amrit Moore RN  Safety Promotion/Fall Prevention:   clutter free environment maintained   fall prevention program maintained   lighting adjusted   room organization consistent   safety round/check completed   supervised activity  Intervention: Prevent Skin Injury  Recent Flowsheet Documentation  Taken 11/10/2021 0200 by Amrit Moore RN  Body Position: position maintained  Taken 11/9/2021 2000 by Amrit Moore RN  Body Position: position maintained  Intervention: Prevent and Manage VTE (Venous Thromboembolism) Risk  Recent Flowsheet Documentation  Taken 11/10/2021 0200 by Amrit Moore RN  VTE Prevention/Management: anticoagulant therapy maintained  Taken 11/9/2021 2000 by Amrit Moore RN  VTE Prevention/Management: anticoagulant therapy maintained  Intervention: Prevent Infection  Recent Flowsheet Documentation  Taken 11/10/2021 0200 by Amrit Moore RN  Infection Prevention:   equipment surfaces disinfected   hand hygiene promoted  Taken 11/9/2021 2000 by Amrit Moore RN  Infection Prevention:   equipment surfaces disinfected   hand hygiene promoted  Goal: Optimal Comfort and Wellbeing  Outcome: Improving  Goal: Readiness for Transition of Care  Outcome: Improving     Problem: Gas Exchange Impaired  Goal: Optimal Gas Exchange  Outcome: Improving  Intervention: Optimize Oxygenation and Ventilation  Recent Flowsheet  Documentation  Taken 11/10/2021 0200 by Amrit Moore, RN  Head of Bed ([facility] [place]) Positioning: HOB at 20-30 degrees  Taken 11/9/2021 2000 by Amrit Moore RN  Head of Bed ([facility] [place]) Positioning: HOB at 20-30 degrees       Oxygen has been turned down to 4LPM this shift via nasal canula, Oxygen saturations remain mid to high 90's.  Pt denies shortness of breath.    Lung sounds are clear throughout.   Pt denies pain, is up stand by assist with no complication.   Skin is CDI.   Will continue to monitor and follow plan of care.

## 2021-11-10 NOTE — PLAN OF CARE
Pt has maintained O2 sats >90% on 6L nasal cannula this shift. Brief drops to 88-89% but recovers quickly.

## 2021-11-10 NOTE — CONSULTS
Care Management Initial Consult    General Information  Assessment completed with: Patient,    Type of CM/SW Visit: Initial Assessment    Primary Care Provider verified and updated as needed:     Readmission within the last 30 days:        Reason for Consult: other (see comments) (Offer the Get Well Loop Program )  Advance Care Planning:    Has no scanned ACP documents        Communication Assessment  Patient's communication style: spoken language (English or Bilingual)    Hearing Difficulty or Deaf: no   Wear Glasses or Blind: no    Cognitive  Cognitive/Neuro/Behavioral: WDL  Level of Consciousness: alert  Arousal Level: opens eyes spontaneously  Orientation: oriented x 4  Mood/Behavior: flat affect  Best Language: 0 - No aphasia  Speech: slow,whispers    Living Environment:   People in home: child(julian), adult,grandchild(julian),spouse  Johnny   Current living Arrangements: house      Able to return to prior arrangements: yes       Family/Social Support:  Care provided by: self  Provides care for: no one  Marital Status:   ,Children  Johnny        Description of Support System: Supportive,Involved    Support Assessment: Adequate family and caregiver support,Adequate social supports    Current Resources:   Patient receiving home care services: No     Community Resources: None  Equipment currently used at home: none  Supplies currently used at home: None    Employment/Financial:  Employment Status: homemaker        Financial Concerns: No concerns identified           Lifestyle & Psychosocial Needs:  Social Determinants of Health     Tobacco Use: Low Risk      Smoking Tobacco Use: Never Smoker     Smokeless Tobacco Use: Never Used   Alcohol Use: Not on file   Financial Resource Strain: Not on file   Food Insecurity: Not on file   Transportation Needs: Not on file   Physical Activity: Not on file   Stress: Not on file   Social Connections: Not on file   Intimate Partner Violence: Not on file   Depression:  "Not on file   Housing Stability: Not on file       Functional Status:  Prior to admission patient needed assistance:   Dependent ADLs:: Independent  Dependent IADLs:: Independent  Assesssment of Functional Status: At functional baseline- with using oxygen    Mental Health Status:  Mental Health Status: No Current Concerns       Chemical Dependency Status:  Chemical Dependency Status: No Current Concerns             Values/Beliefs:  Spiritual, Cultural Beliefs, Voodoo Practices, Values that affect care:            Values/Beliefs Comment: Unknown     Additional Information:  Care Management has been consulted for Get Well Loop, as it is anticipated that patient will be discharging home on oxygen.  Writer spoke with patient over the phone, due to her positive COVID diagnosis.  Patient stated that she was thinking that she might be able to discharge today versus tomorrow.      Discussed and offered the Get Well Loop COVID-19 Home Monitoring Program.  Patient in agreement.  Writer sent written information to patient's room.      Also reviewed the information above.  Patient has no other discharge planning needs. Family to provide transportation home.      1446- Patient left message for Care Management.  Writer called her back. Patient voiced her frustration that \"the doctor now won't order oxygen for me in case I need it and I want to go home today.  I can't sleep well here.  I have oximeter at home that I can check my oxygen levels and use the oxygen when I need it.  I do not understand why the doctor cannot order it in case I need it.\" Patient is aware that the doctor is recommending that she stay in the hospital tonight to monitor any oxygen needs overnight and then determine the plan for tomorrow.  Also that if patient decides to leave today, that it may be against medical advice.     According to the chart, patient has been on room air since this morning starting at 0809.   Writer had spoken with her at 10:15 " "today.  Writer had discussed that it was anticipated that she would discharge with home oxygen tomorrow and that writer was going by the information provided at morning rounds today. But now, during the day, patient has not needed the oxygen. For an unknown reason, patient was thinking she would be ready for discharge today.      Patient requesting that \"the  be an advocate for her.\"  Writer assured patient that writer would advocate for her by communicating her concerns to the doctor and charge nurse.          Sanjana Dey [facility]   [phone]     "

## 2021-11-10 NOTE — PROGRESS NOTES
S-(situation): Patient discharged to home via car with family    B-(background): Covid positive    A-(assessment): Pt is A&O.  VSS.  Afebrile.  Sats on R/A are from 90-92%.  Sometimes has dropped to 89%.  Is independent.  LS diminished.  Pt was discharged against medical advice.  Doctor wanted her stay another night to assess O2 needs during the noc.     R-(recommendations): Discharge instructions reviewed with pt. Listed belongings gathered and returned to patient.          Discharge Nursing Criteria:     Care Plan and Patient education resolved: Yes    New Medications- pt has been educated about purpose and side effects: no new meds    Vaccines  Influenza status verified at discharge:  Not Applicable    MISC  Prescriptions if needed, hard copies sent with patient  NA  Home medications returned to patient: NA  Medication Bin checked and emptied on discharge Yes- refused the meds from hospital.  Were not reordered.  Patient reports post-discharge pain management plan is effective: Yes

## 2021-11-10 NOTE — DISCHARGE SUMMARY
[facility]    Discharge Summary/Medicine Progress Note - Hospitalist Service       Date of Admission:  10/29/2021    Assessment & Plan               Janiya Samuels is a 51 year old female who is unvaccinated for COVID-19 and who was admitted on 10/29/2021 with respiratory failure from COVID-19 pneumonia after she had become ill about October 22 with Covid-like symptoms and family members with same.  She developed rapidly progressive hypoxic respiratory failure necessitating initiation of BiPAP treatment in the ICU.  She completed treatment courses of remdesivir and dexamethasone and received a dose of Actemra on 10/30/21.  Respiratory status showed gradual improvement and she had continued to require oxygen supplementation up until this morning.  Prior to this morning she had required 4 L nasal cannula oxygen supplementation but weaned to room air this morning after waking.  She is anxious for discharge and insisting on discharge today.  However, there are concerns that she may still require oxygen supplementation particularly overnight, so hospital discharge was not recommended medically at this time.  She has been treated during hospitalization with prophylactic anticoagulation and is tolerating advancing activity.  She declines ongoing prophylactic anticoagulation after leaving the hospital.    She was found to have a new diagnosis of prediabetes during this hospitalization with fluctuating glycemic control including hyperglycemia likely exacerbated by corticosteroids.  Hyperglycemia is much improved and has not required any ongoing specific intervention.    Principal Problem:    Pneumonia due to 2019 novel coronavirus  Active Problems:    Acute respiratory failure with hypoxia (H)    Prediabetes-A1c 6.2    Transaminitis    Recommended ongoing hospitalization overnight to assess her need for oxygen supplementation during sleep tonight in order to provide recommendation as to whether  home oxygen supplementation after hospital discharge would be recommended.  Patient insisted on leaving the hospital today.  At the time that she leaves the hospital, she is not requiring oxygen supplementation, so oxygen treatment was not prescribed.  She and her family were informed of the risks of leaving the hospital before she is considered to be medically stable including worsening hypoxia and its consequences at home.  They appeared willing to accept these risks.  They were advised to monitor her oxygenation at home and to seek medical attention if she becomes severely hypoxic.  They were given a referral for a home nocturnal oximetry study.  According to system guidelines, ongoing quarantine is no longer recommended for her after today when she has met her 20th day of illness and has significantly improved.         Diet: Consistent Carb 60 grams CHO per Meal Diet    DVT Prophylaxis: Enoxaparin (Lovenox) SQ  Nguyen Catheter: Not present  Central Lines: None  Code Status: Full Code      Disposition Plan   Expected discharge: 11/11/2021   recommended to prior living arrangement once Need for oxygen supplementation or not has been established.     The patient's care was discussed with the Bedside Nurse, Care Coordinator/, Patient and Patient's Family.  Total floor time 70 minutes including 20 minutes spent in direct face-to-face counseling and discussion with the patient and an additional 20 minutes in face-to-face counseling and discussion with her family for a total of 40 minutes in face-to-face counseling and discussion.  We discussed medical rationale for continued hospitalization to determine need for oxygen supplementation as she recovers from COVID-19 pneumonia and respiratory failure particularly because she has continued to require significant oxygen supplementation up until this morning.  We discussed potential consequences of leaving the hospital AGAINST MEDICAL ADVICE.  They appeared to  have a good understanding and expressed willingness to accept the risks.  Their questions and concerns were answered and addressed.    Maverick Saunders MD  Hospitalist Service  [facility]  Securely message with the Vocera Web Console (learn more here)  Text page via [facility] Paging/Directory        Clinically Significant Risk Factors Present on Admission                ______________________________________________________________________    Interval History   She feels better today and she wants to go home.  She is insisting that she is going to go home today.  She denies any shortness of breath.  She had no significant events overnight last night but was using 4 L nasal cannula oxygen supplementation throughout the night last night as well as all day yesterday.  This morning after waking, oxygen supplementation was discontinued and she has maintained oxygen saturations 90% and higher through the day.  Oxygen saturations improved from 92% to 93% with activity.  She has been afebrile and hemodynamically stable.  She is tolerating good oral intake.    Data reviewed today: I reviewed all medications, new labs and imaging results over the last 24 hours. I personally reviewed no images or EKG's today.    Physical Exam   Vital Signs: Temp: (!) 96.2  F (35.7  C) Temp src: Oral BP: 90/53 Pulse: 62   Resp: 18 SpO2: 93 % O2 Device: None (Room air)   Weight: 184 lbs 14.4 oz   Vitals:    10/29/21 1211 10/30/21 1317 11/01/21 0400 11/02/21 0600   Weight: 88.5 kg (195 lb) 90.3 kg (199 lb) 89.8 kg (198 lb) 89 kg (196 lb 1.6 oz)    11/03/21 0900   Weight: 83.9 kg (184 lb 14.4 oz)       General Appearance: Appears emotionally upset, angry and frustrated in discussing her preference to discharge home today and medical recommendation to remain hospitalized  Respiratory: Normal respiratory effort, clear lungs     Data   Recent Labs   Lab 11/10/21  1141 11/10/21  0807 11/09/21 2003 11/09/21  1153  11/09/21 0909 11/08/21 0821 11/08/21  0623   WBC  --   --   --   --  13.1*  --   --    HGB  --   --   --   --  14.1  --   --    MCV  --   --   --   --  88  --   --    PLT  --   --   --   --  469*  --  508*   NA  --   --   --   --  138  --   --    POTASSIUM  --   --   --   --  3.9  --   --    CHLORIDE  --   --   --   --  105  --   --    CO2  --   --   --   --  27  --   --    BUN  --   --   --   --  13  --   --    CR  --   --   --   --  0.51*  --   --    ANIONGAP  --   --   --   --  6  --   --    HI  --   --   --   --  8.5  --   --    * 111* 146*   < > 131*   < >  --     < > = values in this interval not displayed.     Procalcitonin yesterday was negative    Medications       cholecalciferol  125 mcg Oral Daily     enoxaparin ANTICOAGULANT  0.5 mg/kg Subcutaneous Q24H     senna-docusate  1 tablet Oral At Bedtime     vitamin C  1,000 mg Oral Daily     zinc sulfate  220 mg Oral Daily

## 2021-11-12 ENCOUNTER — TELEPHONE (OUTPATIENT)
Dept: FAMILY MEDICINE | Facility: CLINIC | Age: 51
End: 2021-11-12
Payer: COMMERCIAL

## 2021-11-12 NOTE — TELEPHONE ENCOUNTER
Hospital follow-up not appropriate for virtual visit will need office visit please call patient to reschedule    Ines Jones CNP

## 2021-12-04 NOTE — PROGRESS NOTES
"Janiya Samuels  Gender: female  : 1970  [address]   [facility] [address]  [phone] (home)     Medical Record: [id number]  Pharmacy:    [id number] - [place], [place] - [address] [facility] [place] [address]  [id number] - [place], [place] - [address]  Primary Care Provider: No Ref-Primary, Physician    Parent's names are: Data Unavailable (mother) and Data Unavailable (father).      [facility]  2021     Discharge Phone Call:  Key Words/Key Times      Introduction - AIDET (Acknowledge, Introduce, Duration, Explanation)      Empathy-   We are calling to see how you are since your recent stay in the hospital?     Call back COMMENTS: \"I'm good. I did get oxygen a couple weeks ago, but haven't used it for 3-4 days.\" \"Doing the breathing thing just below the 1500 harper. \" \"Every day I feel like I'm getting better.\"      Clinical Questions -  (f/u appts, medication side effects/purpose, ability to care for self at home) \"For your safety, it is important to us that you understand the purpose and side effects of your medications, can you tell me what your new medications are?\"     Call back COMMENTS: Has seen her primary doctor who helped her to get oxygen. On no meds.  Did a round of Ivermectin, but is done with that.      Staff Recognition -  We like to recognize staff and physicians who have done an excellent job.  Do you remember any people from your care team that you would like recognize?     Call back COMMENTS: \"Shweta\"      Very Good Care -  We want to provide very good care to all patients.  How was your care?     Call back COMMENTS: \"I actually don't think my care was good. That is actually why I checked myself out.\" States she wanted oxygen to take home and was denied. Also wanted Ivermectin and was told no. Wanted to go home sooner than physician felt comfortable sending her, and was then rushed " out. Thermostat in room was at 65 degrees or 80 degrees. Nurses kept asking her if she was vaccinated and why not. Hospitalists wanted her to get vaccinated while she was hospitalized. Very frustrated.      Opportunities for Improvement -  Our goal is to be the best.  Do you have any suggestions for things that we could improve upon?     Call back COMMENTS: See above.      Thank You

## 2022-03-20 ENCOUNTER — HEALTH MAINTENANCE LETTER (OUTPATIENT)
Age: 52
End: 2022-03-20

## 2022-09-11 ENCOUNTER — HEALTH MAINTENANCE LETTER (OUTPATIENT)
Age: 52
End: 2022-09-11

## 2023-04-30 ENCOUNTER — HEALTH MAINTENANCE LETTER (OUTPATIENT)
Age: 53
End: 2023-04-30

## 2024-07-07 ENCOUNTER — HEALTH MAINTENANCE LETTER (OUTPATIENT)
Age: 54
End: 2024-07-07

## 2024-09-03 ENCOUNTER — TRANSCRIBE ORDERS (OUTPATIENT)
Dept: OTHER | Age: 54
End: 2024-09-03

## 2024-09-03 DIAGNOSIS — R06.83 SNORING: Primary | ICD-10-CM

## 2024-10-09 NOTE — PROGRESS NOTES
"Virtual Visit Details    Type of service:  Video Visit   Start Time: 8:22 AM   End Time: 9:12 AM    Originating Location (pt. Location): Home    Distant Location (provider location):  Off-site  Platform used for Video Visit: [facility]    Outpatient Sleep Medicine Consultation:      Name: Janiya Samuels MRN# [id number]   Age: 54 year old YOB: 1970     Date of Consultation: October 9, 2024  Consultation is requested by: No referring provider defined for this encounter. No ref. provider found  Primary care provider: No Ref-Primary, Physician       Reason for Sleep Consult:     Janiya Samuels is sent by Thao Calero CNP for a sleep consultation regarding snoring.    Patient s Reason for visit  Janiya Samuels main reason for visit:    Patient states problem(s) started:    Janiya Samuels's goals for this visit:             Assessment and Plan:     Summary Sleep Diagnoses and Recommendations:  (G47.30) Observed sleep apnea  (primary encounter diagnosis), (R06.83) Snoring, (G47.8) Non-restorative sleep  Comment: Janiya presents with concerns of sleep apnea.  She has been observed to snore very loudly and have pauses in breathing for years.  She has very strong family history of apnea in her father, 2 brothers and son.  They have a family cabin, and all of her family members have been telling her she stops breathing in her sleep.  She feels unrefreshed upon awakening and requires a couple of cups of coffee for her \"brain to function.\"  That said, she denies napping or inadvertent dozing.  She has difficulty falling asleep if she is not laying down.  Her ESS was normal at 4/24.  STOP BANG TOTAL: 4 snoring, tired, observed apnea, age >50 (54).  Negative risk factors include no HTN, BMI<35 (29), female gender.  Janiya was hospitalized in 2021 with COVID pneumonia.  She feels her lung function has not returned to normal. She has some dyspnea with exertion. She does have a home oximeter and her SpO2 is usually 94-97%.  She " had several arterial blood gas samples drawn while in the hospital and her CO2 was never elevated.  She was on BiPAP in the hospital, though. Overall, I do not think there is enough evidence to suggest high risk for hypoventilation as her CO2 was not elevated and O2 seems to run in the normal range at rest.  Plan: HST - Home Sleep Apnea Test  - WatchPat NonReturnable  Given she lives in [place], I have ordered a disposable test that can be mailed to her.               Comorbid Diagnoses:  Patient Active Problem List   Diagnosis    Acute respiratory failure with hypoxia (H) 2021 due to COVID pneumonia   depression        Summary Counseling:    Sleep Testing Reviewed  Obstructive Sleep Apnea Reviewed  Complications of Untreated Sleep Apnea Reviewed      Patient will follow up 3 months after sleep study. We will review the study results over MyChart and initiate treatment before the follow up if indicated.  Bennett Goltz, PA-C      Total time spent reviewing medical records, history and physical examination, review of previous testing and interpretation as well as documentation on this date:60 min    CC: Thao Calero CNP         History of Present Illness:     Janiya Samuels presents with concerns of sleep apnea. She has a strong family history of TIMOTHY in her father, 2 brothers and son. They share a family cabin and have reported to her that she stops breathing in her sleep. She has low energy in the day. She feels unrefreshed when she wakes and needs coffee for her brain to work. She denies napping or dozing inadvertently, though. She has also woken herself a handful of times with her own snoring.     She was hospitalized for 13 days with COVID pneumonia in 10/2021. She states she still uses her incentive spirometer and does not feel her lung function has returned. She gets short of breath with hiking. She had a number of ABG's around the time of her admission and her CO2 was always normal or even low. She was on  BiPAP in the hospital. She has a home oximeter and her O2 can be as low as 90% at the lowest. In the last few weeks, it has been 94-97%.  She has had some orthostatic hypotension.    Past Sleep Evaluations: None     SLEEP-WAKE SCHEDULE:     Work/School Days: Patient goes to school/work:     Usually gets into bed at   10 PM  Takes patient about 30 min  to fall asleep.   Has trouble falling asleep  0 nights per week  Wakes up in the middle of the night 1-2  times.  Wakes up due to  restroom  She has trouble falling back asleep 0  times a week.   It usually takes 2 min  to get back to sleep  Patient is usually up at  8 AM  Uses alarm:  yes    Weekends/Non-work Days/All Other Days:  Usually gets into bed at   10 PM  Takes patient about 30 min  to fall asleep  Patient is usually up at  6:30-7 AM on Sundays for Jew. 8 AM on Saturdays.  Uses alarm:   yes    Sleep Need  Patient gets  9-10 hours  sleep on average   Patient thinks she needs about less  sleep, but she still wakes up tired.    Janiya Samuels prefers to sleep in this position(s):   side, wakes supine  Patient states they do the following activities in bed:  no TV, electronics or reading in bed    Naps  Patient takes a purposeful nap 0  times a week and naps are usually   in duration. Has difficulty sleeping unless laying down.  She feels better after a nap:    She dozes off unintentionally 0  days per week  Patient has had a driving accident or near-miss due to sleepiness/drowsiness:   no      SLEEP DISRUPTIONS:    Breathing/Snoring  Patient snores:  yes  Other people complain about her snoring:   yes,  does. Siblings can hear her from other rooms when they are in the cabin.  Patient has been told she stops breathing in her sleep:  yes  She has issues with the following:  . Morning headaches: infrequently, seems caffeine related. Nocturnal heartburn or reflux: no. Nasal congestion at night: no.    Movement:  Patient gets pain, discomfort, with an urge  to move: no   restless legs symptoms  It happens when she is resting:     It happens more at night:     Patient has been told she kicks her legs at night:   no     Behaviors in Sleep:  Janiya Samuels has experienced the following behaviors while sleeping:   has woken up with teeth clenched just recently (dentist has not commented).  Pt denies sleep talking, sleep walking, and dream enactment behavior. Pt denies sleep paralysis, hypnagogue and cataplexy.       Is there anything else you would like your sleep provider to know:        CAFFEINE AND OTHER SUBSTANCES:    Patient consumes caffeinated beverages per day:    1-2 cups in the morning  Last caffeine use is usually:   noon  List of any prescribed or over the counter stimulants that patient takes:   none  List of any prescribed or over the counter sleep medication patient takes:  none  List of previous sleep medications that patient has tried:  melatonin when stressed (helped her fall asleep a little, has not needed it for months)  Patient drinks alcohol to help them sleep:  no  Patient drinks alcohol near bedtime:  no    Family History:  Patient has a family member been diagnosed with a sleep disorder:      Father, brothers, son have TIMOTHY    Social History:  She works as a , part-time. 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   She lives with her  and 2 daughters (with 2 grandkids), and one other is coming home soon.     SCALES:    EPWORTH SLEEPINESS SCALE         10/14/2024     8:10 AM    [place] Sleepiness Scale ( TASHA Alicea  [id number]<br>ESS - [place]/English - Final version - 21 Nov 07 - [facility] [place].)   Sitting and reading Would never doze   Watching TV Would never doze   Sitting, inactive in a public place (e.g. a theatre or a meeting) Would never doze   As a passenger in a car for an hour without a break Slight chance of dozing   Lying down to rest in the afternoon when circumstances permit High chance of dozing  "  Sitting and talking to someone Would never doze   Sitting quietly after a lunch without alcohol Would never doze   In a car, while stopped for a few minutes in traffic Would never doze   [place] Score (MC) 4   [place] Score (Sleep) 4         INSOMNIA SEVERITY INDEX (MARK)          10/14/2024     8:09 AM   Insomnia Severity Index (MARK)   Difficulty falling asleep 0   Difficulty staying asleep 1   Problems waking up too early 0   How SATISFIED/DISSATISFIED are you with your CURRENT sleep pattern? 3   How NOTICEABLE to others do you think your sleep problem is in terms of impairing the quality of your life? 2   How WORRIED/DISTRESSED are you about your current sleep problem? 1   To what extent do you consider your sleep problem to INTERFERE with your daily functioning (e.g. daytime fatigue, mood, ability to function at work/daily chores, concentration, memory, mood, etc.) CURRENTLY? 1   MARK Total Score 8       Guidelines for Scoring/Interpretation:  Total score categories:  0-7 = No clinically significant insomnia   8-14 = Subthreshold insomnia   15-21 = Clinical insomnia (moderate severity)  22-28 = Clinical insomnia (severe)  Used via courtesy of www.OpenWhereealth.va.gov with permission from Theo Cheatham PhD., [facility]      STOP BANG         10/14/2024     8:12 AM   STOP BANG Questionnaire (  2008, the American Society of Anesthesiologists, Inc. [employer]   BMI Clinic: 29.65         GAD7         No data to display                  CAGE-AID         No data to display                CAGE-AID reprinted with permission from the [place] Medical Journal, NIKOS Arroyo. and EZIO Edwards, \"Conjoint screening questionnaires for alcohol and drug abuse\" [place] Medical Journal 94: 135-140, 1995.      PATIENT HEALTH QUESTIONNAIRE-9 (PHQ - 9)        5/18/2015     1:30 PM   PHQ-9 ([employer])   No Interest In Doing Things 1   Feeling Depressed 0   Trouble Sleeping 1   Tired / No Energy 1   No appetite " or Over-Eating 1   Feeling Bad about Self 1   Trouble Concentrating 1   Moving Slow or Restless 0   Suicidal Thoughts 0   Total Score 6       Developed by Rsomery Clark, Torrie Dc, Cheo Patricia and colleagues, with an educational thomas from [employer] No permission required to reproduce, translate, display or distribute.        Allergies:    Allergies   Allergen Reactions    Erythromycin        Medications:    Current Outpatient Medications   Medication Sig Dispense Refill    calcium carbonate (TUMS) 500 MG chewable tablet Take 1 chew tab by mouth daily      Cholecalciferol (VITAMIN D3 PO) Take 1 tablet by mouth daily Unknown strength      ibuprofen (ADVIL/MOTRIN) 600 MG tablet Take 600 mg by mouth every 6 hours as needed for moderate pain       Multiple Vitamins-Minerals (ZINC PO) Take 1 tablet by mouth daily Unknown strength      sertraline (ZOLOFT) 100 MG tablet Take 1 tablet by mouth daily at 2 pm.         Problem List:  Patient Active Problem List    Diagnosis Date Noted    Prediabetes-A1c 6.2 2021     Priority: Medium    Acute respiratory failure with hypoxia (H) 10/30/2021     Priority: Medium    Pneumonia due to 2019 novel coronavirus 10/30/2021     Priority: Medium    Transaminitis 10/30/2021     Priority: Medium    S/P laparoscopic cholecystectomy 2017     Priority: Medium        Past Medical/Surgical History:  No past medical history on file.  Past Surgical History:   Procedure Laterality Date     SECTION      5th child    LAPAROSCOPIC CHOLECYSTECTOMY N/A 2017    Procedure: LAPAROSCOPIC CHOLECYSTECTOMY;  Laparoscopic Cholecystectomy;  Surgeon: Cordell Cisneros MD;  Location:  OR       Social History:  Social History     Socioeconomic History    Marital status:      Spouse name: Not on file    Number of children: Not on file    Years of education: Not on file    Highest education level: Not on file   Occupational History    Not on file   Tobacco Use  "   Smoking status: Never     Passive exposure: Never    Smokeless tobacco: Never   Vaping Use    Vaping status: Never Used   Substance and Sexual Activity    Alcohol use: Yes     Comment: occ    Drug use: Not Currently    Sexual activity: Yes     Partners: Male     Birth control/protection: Surgical     Comment:  vast   Other Topics Concern    Parent/sibling w/ CABG, MI or angioplasty before 65F 55M? Not Asked   Social History Narrative    Not on file     Social Determinants of Health     Financial Resource Strain: Not on file   Food Insecurity: Not on file   Transportation Needs: Not on file   Physical Activity: Not on file   Stress: Not on file   Social Connections: Not on file   Interpersonal Safety: Not on file   Housing Stability: Not on file       Family History:  Family History   Problem Relation Age of Onset    Other Cancer Mother     Diabetes Mother     Hypertension Mother     Hypertension Father        Review of Systems:  A complete review of systems reviewed by me is negative with the exeption of what has been mentioned in the history of present illness.        Physical Examination:  Vitals: Ht 1.727 m (5' 8\")   Wt 88.5 kg (195 lb)   BMI 29.65 kg/m    BMI= Body mass index is 29.65 kg/m .           GENERAL APPEARANCE: healthy, alert, no distress, and cooperative  EYES: Eyes grossly normal to inspection and wearing glasses  HENT: oral mucous membranes moist and oropharynx clear  NECK: no asymmetry, masses, or scars  RESP: no apparent respiratory distress (retractions or difficulty speaking in full sentences), no audible wheeze or cough   Mallampati Class: II.  Tonsillar Stage: 2  visible at pillars.         Data: All pertinent previous laboratory data reviewed     Recent Labs   Lab Test 11/10/21  1141 11/10/21  0807 11/09/21  1153 11/09/21  0909 11/03/21  0822 11/03/21  0535   NA  --   --   --  138  --  139   POTASSIUM  --   --   --  3.9  --  3.7   CHLORIDE  --   --   --  105  --  108   CO2  --   " "--   --  27  --  30   ANIONGAP  --   --   --  6  --  1*   * 111*   < > 131*   < > 111*   BUN  --   --   --  13  --  15   CR  --   --   --  0.51*  --  0.43*   HI  --   --   --  8.5  --  7.8*    < > = values in this interval not displayed.       Recent Labs   Lab Test 11/09/21  0909   WBC 13.1*   RBC 4.77   HGB 14.1   HCT 41.9   MCV 88   MCH 29.6   MCHC 33.7   RDW 13.8   *       Recent Labs   Lab Test 11/01/21  0426   PROTTOTAL 6.1*   ALBUMIN 2.2*   BILITOTAL 0.4   ALKPHOS 53   AST 37   ALT 54*       No results found for: \"TSH\"    No results found for: \"UAMP\", \"UBARB\", \"BENZODIAZEUR\", \"UCANN\", \"UCOC\", \"OPIT\", \"UPCP\"    No results found for: \"IRONSAT\", \"HW83067\", \"OMAR\"    pH Arterial (no units)   Date Value   11/03/2021 7.50 (H)   11/03/2021 7.45     pO2 Arterial (mm Hg)   Date Value   11/03/2021 65 (L)   11/03/2021 67 (L)     pCO2 Arterial (mm Hg)   Date Value   11/03/2021 37   11/03/2021 42     Bicarbonate Arterial (mmol/L)   Date Value   11/03/2021 29 (H)   11/03/2021 29 (H)     Base Excess/Deficit Arterial (mmol/L)   Date Value   11/03/2021 5.3 (H)   11/03/2021 4.5 (H)       @LABRCNTIPR(phv:4,pco2v:4,po2v:4,hco3v:4,saran:4,o2per:4)@    Echocardiology: No results found for this or any previous visit (from the past 4320 hour(s)).    Chest x-ray: No results found for this or any previous visit from the past 365 days.      Chest CT: No results found for this or any previous visit from the past 365 days.      PFT: Most Recent [facility] Pulmonary Function Testing    No results found for: \"20001\"        Bennett Ezra Goltz, PA-C, [facility] 10/9/2024          "

## 2024-10-14 ENCOUNTER — VIRTUAL VISIT (OUTPATIENT)
Dept: SLEEP MEDICINE | Facility: CLINIC | Age: 54
End: 2024-10-14
Payer: COMMERCIAL

## 2024-10-14 VITALS — HEIGHT: 68 IN | BODY MASS INDEX: 29.55 KG/M2 | WEIGHT: 195 LBS

## 2024-10-14 DIAGNOSIS — G47.30 OBSERVED SLEEP APNEA: Primary | ICD-10-CM

## 2024-10-14 DIAGNOSIS — R06.83 SNORING: ICD-10-CM

## 2024-10-14 DIAGNOSIS — G47.8 NON-RESTORATIVE SLEEP: ICD-10-CM

## 2024-10-14 PROCEDURE — 99205 OFFICE O/P NEW HI 60 MIN: CPT | Mod: 95 | Performed by: PHYSICIAN ASSISTANT

## 2024-10-14 RX ORDER — SERTRALINE HYDROCHLORIDE 100 MG/1
1 TABLET, FILM COATED ORAL
COMMUNITY
Start: 2024-08-20

## 2024-10-14 ASSESSMENT — SLEEP AND FATIGUE QUESTIONNAIRES
HOW LIKELY ARE YOU TO NOD OFF OR FALL ASLEEP WHILE WATCHING TV: WOULD NEVER DOZE
HOW LIKELY ARE YOU TO NOD OFF OR FALL ASLEEP WHILE SITTING INACTIVE IN A PUBLIC PLACE: WOULD NEVER DOZE
HOW LIKELY ARE YOU TO NOD OFF OR FALL ASLEEP WHILE SITTING AND TALKING TO SOMEONE: WOULD NEVER DOZE
HOW LIKELY ARE YOU TO NOD OFF OR FALL ASLEEP WHILE LYING DOWN TO REST IN THE AFTERNOON WHEN CIRCUMSTANCES PERMIT: HIGH CHANCE OF DOZING
HOW LIKELY ARE YOU TO NOD OFF OR FALL ASLEEP WHEN YOU ARE A PASSENGER IN A CAR FOR AN HOUR WITHOUT A BREAK: SLIGHT CHANCE OF DOZING
HOW LIKELY ARE YOU TO NOD OFF OR FALL ASLEEP WHILE SITTING QUIETLY AFTER LUNCH WITHOUT ALCOHOL: WOULD NEVER DOZE
HOW LIKELY ARE YOU TO NOD OFF OR FALL ASLEEP IN A CAR, WHILE STOPPED FOR A FEW MINUTES IN TRAFFIC: WOULD NEVER DOZE
HOW LIKELY ARE YOU TO NOD OFF OR FALL ASLEEP WHILE SITTING AND READING: WOULD NEVER DOZE

## 2024-10-14 ASSESSMENT — PAIN SCALES - GENERAL: PAINLEVEL: NO PAIN (0)

## 2024-10-14 NOTE — PATIENT INSTRUCTIONS
"          MY TREATMENT INFORMATION FOR SLEEP APNEA-  Janiya Samuels    DOCTOR : Bennett Goltz, PA-C    Am I having a sleep study at a sleep center?  --->Due to normal delays, you will be contacted within 2-4 weeks to schedule    Am I having a home sleep study?  --->Watch the video for the device you are using:    Below is a link to a [employer] video showing you how the M[employer] One home sleep test is conducted:  https://www.youChumbakube.com/watch?v=BCce_vbiwxE      Frequently asked questions:  1. What is Obstructive Sleep Apnea (TIMOTHY)? TIMOTHY is the most common type of sleep apnea. Apnea means, \"without breath.\"  Apnea is most often caused by narrowing or collapse of the upper airway as muscles relax during sleep.   Almost everyone has occasional apneas. Most people with sleep apnea have had brief interruptions at night frequently for many years.  The severity of sleep apnea is related to how frequent and severe the events are.   2. What are the consequences of TIMOTHY? Symptoms include: feeling sleepy during the day, snoring loudly, gasping or stopping of breathing, trouble sleeping, and occasionally morning headaches or heartburn at night.  Sleepiness can be serious and even increase the risk of falling asleep while driving. Other health consequences may include development of high blood pressure and other cardiovascular disease in persons who are susceptible. Untreated TIMOTHY  can contribute to heart disease, stroke and diabetes.   3. What are the treatment options? In most situations, sleep apnea is a lifelong disease that must be managed with daily therapy. Medications are not effective for sleep apnea and surgery is generally not considered until other therapies have been tried. Your treatment is your choice . Continuous Positive Airway (CPAP) works right away and is the therapy that is effective in nearly everyone. An oral device to hold your jaw forward is usually the next most reliable option. Other options include postioning " devices (to keep you off your back), weight loss, and surgery including a tongue pacing device. There is more detail about some of these options below.  4. Are my sleep studies covered by insurance? Although we will request verification of coverage, we advise you also check in advance of the study to ensure there is coverage.    Important tips for those choosing CPAP and similar devices  REMEMBER-IF YOU RECEIVE A CALL FROM  [phone]-->IT IS TO SETUP A DEVICE  For new devices, sign up for device TONE to monitor your device for your followup visits  We encourage you to utilize the [employer] tone or website ( https://[employer]/ ) to monitor your therapy progress and share the data with your healthcare team when you discuss your sleep apnea.                                                    Know your equipment:  CPAP is continuous positive airway pressure that prevents obstructive sleep apnea by keeping the throat from collapsing while you are sleeping. In most cases, the device is  smart  and can slowly self-adjusts if your throat collapses and keeps a record every day of how well you are treated-this information is available to you and your care team.  BPAP is bilevel positive airway pressure that keeps your throat open and also assists each breath with a pressure boost to maintain adequate breathing.  Special kinds of BPAP are used in patients who have inadequate breathing from lung or heart disease. In most cases, the device is  smart  and can slowly self-adjusts to assist breathing. Like CPAP, the device keeps a record of how well you are treated.  Your mask is your connection to the device. You get to choose what feels most comfortable and the staff will help to make sure if fits. Here: are some examples of the different masks that are available: Magnetic mask aids may assist with use but there are safety issues that should be addressed when considering with magnets* ( see end of discussion).        Nelson points to remember on your journey with sleep apnea:  Sleep study.  PAP devices often need to be adjusted during a sleep study to show that they are effective and adjusted right.  Good tips to remember: Try wearing just the mask during a quiet time during the day so your body adapts to wearing it. A humidifier is recommended for comfort in most cases to prevent drying of your nose and throat. Allergy medication from your provider may help you if you are having nasal congestion.  Getting settled-in. It takes more than one night for most of us to get used to wearing a mask. Try wearing just the mask during a quiet time during the day so your body adapts to wearing it. A humidifier is recommended for comfort in most cases. Our team will work with you carefully on the first day and will be in contact within 4 days and again at 2 and 4 weeks for advice and remote device adjustments. Your therapy is evaluated by the device each day.   Use it every night. The more you are able to sleep naturally for 7-8 hours, the more likely you will have good sleep and to prevent health risks or symptoms from sleep apnea. Even if you use it 4 hours it helps. Occasionally all of us are unable to use a medical therapy, in sleep apnea, it is not dangerous to miss one night.   Communicate. Call our skilled team on the number provided on the first day if your visit for problems that make it difficult to wear the device. Over 2 out of 3 patients can learn to wear the device long-term with help from our team. Remember to call our team or your sleep providers if you are unable to wear the device as we may have other solutions for those who cannot adapt to mask CPAP therapy. It is recommended that you sleep your sleep provider within the first 3 months and yearly after that if you are not having problems.   Use it for your health. We encourage use of CPAP masks during daytime quiet periods to allow your face and brain to adapt to the  sensation of CPAP so that it will be a more natural sensation to awaken to at night or during naps. This can be very useful during the first few weeks or months of adapting to CPAP though it does not help medically to wear CPAP during wakefulness and  should not be used as a strategy just to meet guidelines.  Take care of your equipment. Make sure you clean your mask and tubing using directions every day and that your filter and mask are replaced as recommended or if they are not working.     *Masks with magnets:  Updated Contraindications  Masks with magnetic components are contraindicated for use by patients where they, or anyone in close physical contact while using the mask, have the following:   Active medical implants that interact with magnets (i.e., pacemakers, implantable cardioverter defibrillators (ICD), neurostimulators, cerebrospinal fluid (CSF) shunts, insulin/infusion pumps)   Metallic implants/objects containing ferromagnetic material (i.e., aneurysm clips/flow disruption devices, embolic coils, stents, valves, electrodes, implants to restore hearing or balance with implanted magnets, ocular implants, metallic splinters in the eye)  Updated Warning  Keep the mask magnets at a safe distance of at least 6 inches (150 mm) away from implants or medical devices that may be adversely affected by magnetic interference. This warning applies to you or anyone in close physical contact with your mask. The magnets are in the frame and lower headgear clips, with a magnetic field strength of up to 400mT. When worn, they connect to secure the mask but may inadvertently detach while asleep.  Implants/medical devices, including those listed within contraindications, may be adversely affected if they change function under external magnetic fields or contain ferromagnetic materials that attract/repel to magnetic fields (some metallic implants, e.g., contact lenses with metal, dental implants, metallic cranial plates,  screws, christen hole covers, and bone substitute devices). Consult your physician and  of your implant / other medical device for information on the potential adverse effects of magnetic fields.    BESIDES CPAP, WHAT OTHER THERAPIES ARE THERE?    Positioning Device  Positioning devices are generally used when sleep apnea is mild and only occurs on your back.This example shows a pillow that straps around the waist. It may be appropriate for those whose sleep study shows milder sleep apnea that occurs primarily when lying flat on one's back. Preliminary studies have shown benefit but effectiveness at home may need to be verified by a home sleep test. These devices are generally not covered by medical insurance.  Examples of devices that maintain sleeping on the back to prevent snoring and mild sleep apnea.    Belt type body positioner  http://[employer]/    Electronic reminder  http://nightshifttherapy.com/            Oral Appliance  What is oral appliance therapy?  An oral appliance device fits on your teeth at night like a retainer used after having braces. The device is made by a specialized dentist and requires several visits over 1-2 months before a manufactured device is made to fit your teeth and is adjusted to prevent your sleep apnea. Once an oral device is working properly, snoring should be improved. A home sleep test may be recommended at that time if to determine whether the sleep apnea is adequately treated.       Some things to remember:  -Oral devices are often, but not always, covered by your medical insurance. Be sure to check with your insurance provider.   -If you are referred for oral therapy, you will be given a list of specialized dentists to consider or you may choose to visit the Web site of the American Academy of Dental Sleep Medicine  -Oral devices are less likely to work if you have severe sleep apnea or are extremely overweight.     More detailed information  An oral appliance  is a small acrylic device that fits over the upper and lower teeth  (similar to a retainer or a mouth guard). This device slightly moves jaw forward, which moves the base of the tongue forward, opens the airway, improves breathing for effective treat snoring and obstructive sleep apnea in perhaps 7 out of 10 people .  The best working devices are custom-made by a dental device  after a mold is made of the teeth 1, 2, 3.  When is an oral appliance indicated?  Oral appliance therapy is recommended as a first-line treatment for patients with primary snoring, mild sleep apnea, and for patients with moderate sleep apnea who prefer appliance therapy to use of CPAP4, 5. Severity of sleep apnea is determined by sleep testing and is based on the number of respiratory events per hour of sleep.   How successful is oral appliance therapy?  The success rate of oral appliance therapy in patients with mild sleep apnea is 75-80% while in patients with moderate sleep apnea it is 50-70%. The chance of success in patients with severe sleep apnea is 40-50%. The research also shows that oral appliances have a beneficial effect on the cardiovascular health of TIMOTHY patients at the same magnitude as CPAP therapy7.  Oral appliances should be a second-line treatment in cases of severe sleep apnea, but if not completely successful then a combination therapy utilizing CPAP plus oral appliance therapy may be effective. Oral appliances tend to be effective in a broad range of patients although studies show that the patients who have the highest success are females, younger patients, those with milder disease, and less severe obesity. 3, 6.   Finding a dentist that practices dental sleep medicine  Specific training is available through the American Academy of Dental Sleep Medicine for dentists interested in working in the field of sleep. To find a dentist who is educated in the field of sleep and the use of oral appliances, near you,  visit the Web site of the American Academy of Dental Sleep Medicine.    References  1. Christina, et al. Objectively measured vs self-reported compliance during oral appliance therapy for sleep-disordered breathing. Chest 2013; 144(5): 8693-6107.  2. Carie et al. Objective measurement of compliance during oral appliance therapy for sleep-disordered breathing. Thorax 2013; 68(1): 91-96.  3. Amber, et al. Mandibular advancement devices in 620 men and women with TIMOTHY and snoring: tolerability and predictors of treatment success. Chest 2004; 125: 4873-9402.  4. John et al. Oral appliances for snoring and TIMOTHY: a review. Sleep 2006; 29: 244-262.  5. David et al. Oral appliance treatment for TIMOTHY: an update. J Clin Sleep Med 2014; 10(2): 215-227.  6. Allegra et al. Predictors of OSAH treatment outcome. J Dent Res 2007; 86: 8227-6576.      Weight Loss:   Your Body mass index is 29.65 kg/m .    Being overweight does not necessarily mean you will have health consequences.  Those who have BMI over 35 or over 27 with existing medical conditions carries greater risk.   Weight loss decreases severity of sleep apnea in most people with obesity. For those with mild obesity who have developed snoring with weight gain, even 15-30 pound weight loss can improve and occasionally milder eliminate sleep apnea.  Structured and life-long dietary and health habits are necessary to lose weight and keep healthier weight levels.     The Comprehensive Weight loss program offers all aspects of weight loss strategies including two Non-Surgical Weight Loss Programs: Medical Weight Management and our 24 Week Healthy Lifestyle Program:    Medical Weight Management: You will meet with a Medical Weight Management Provider, as well as a Registered Dietician. The program may include medication therapy, dietary education, recommended exercise and physical therapy programs, monthly support group meetings, and possible psychological  counseling. Follow up visits with the provider or dietician are scheduled based on your progress and needs.    24 Week Healthy Lifestyle Program: This unique program is designed to give you the support of weekly appointments and activities thru a 24-week period. It may include all of the components of the basic program (above), with the addition of 11 individual Health  Visits, 24-week access to the [employer] website for over 700 online classes, and monthly support group meetings. This program has an out-of-pocket expense of $499 to cover the items that can not be billed to insurance (health coaches and [employer] access), and is non-refundable/non-transferable (you may be able to use a Health Savings Account; ask your HSA provider). There may be an optional meal replacement plan prescribed as well.   Surgical management achieves meaningful long-term weight loss and improvement in health risks in most patients with more severe obesity.      Sleep Apnea Surgery:    Surgery for obstructive sleep apnea is considered generally only when other therapies fail to work. Surgery may be discussed with you if you are having a difficult time tolerating CPAP and or when there is an abnormal structure that requires surgical correction.  Nose and throat surgeries often enlarge the airway to prevent collapse.  Most of these surgeries create pain for 1-2 weeks and up to half of the most common surgeries are not effective throughout life.  You should carefully discuss the benefits and drawbacks to surgery with your sleep provider and surgeon to determine if it is the best solution for you.   More information  Surgery for TIMOTHY is directed at areas that are responsible for narrowing or complete obstruction of the airway during sleep.  There are a wide range of procedures available to enlarge and/or stabilize the airway to prevent blockage of breathing in the three major areas where it can occur: the palate, tongue, and nasal  regions.  Successful surgical treatment depends on the accurate identification of the factors responsible for obstructive sleep apnea in each person.  A personalized approach is required because there is no single treatment that works well for everyone.  Because of anatomic variation, consultation with an examination by a sleep surgeon is a critical first step in determining what surgical options are best for each patient.  In some cases, examination during sedation may be recommended in order to guide the selection of procedures.  Patients will be counseled about risks and benefits as well as the typical recovery course after surgery. Surgery is typically not a cure for a person s TIMOTHY.  However, surgery will often significantly improve one s TIMOTHY severity (termed  success rate ).  Even in the absence of a cure, surgery will decrease the cardiovascular risk associated with OSA7; improve overall quality of life8 (sleepiness, functionality, sleep quality, etc).  Palate Procedures:  Patients with TIMOTHY often have narrowing of their airway in the region of their tonsils and uvula.  The goals of palate procedures are to widen the airway in this region as well as to help the tissues resist collapse.  Modern palate procedure techniques focus on tissue conservation and soft tissue rearrangement, rather than tissue removal.  Often the uvula is preserved in this procedure. Residual sleep apnea is common in patient after pharyngoplasty with an average reduction in sleep apnea events of 33%2.    Tongue Procedures:  ExamWhile patients are awake, the muscles that surround the throat are active and keep this region open for breathing. These muscles relax during sleep, allowing the tongue and other structures to collapse and block breathing.  There are several different tongue procedures available.  Selection of a tongue base procedure depends on characteristics seen on physical exam.  Generally, procedures are aimed at removing bulky  tissues in this area or preventing the back of the tongue from falling back during sleep.  Success rates for tongue surgery range from 50-62%3.  Hypoglossal Nerve Stimulation:  Hypoglossal nerve stimulation has recently received approval from the [place] Food and Drug Administration for the treatment of obstructive sleep apnea.  This is based on research showing that the system was safe and effective in treating sleep apnea6.  Results showed that the median AHI score decreased 68%, from 29.3 to 9.0. This therapy uses an implant system that senses breathing patterns and delivers mild stimulation to airway muscles, which keeps the airway open during sleep.  The system consists of three fully implanted components: a small generator (similar in size to a pacemaker), a breathing sensor, and a stimulation lead.  Using a small handheld remote, a patient turns the therapy on before bed and off upon awakening.    Candidates for this device must be greater than 18 years of age, have moderate to severe obstructive sleep apnea with less than 25% central events  (AHI between 15-65), BMI less than 35, have tried CPAP/oral appliance for at least 8 weeks without success, and have appropriate upper airway anatomy (determined by a sleep endoscopy performed by Dr. Mauri Hernandez or Dr. Kemal Polo).  Nasal Procedures:  Nasal obstruction can interfere with nasal breathing during the day and night.  Studies have shown that relief of nasal obstruction can improve the ability of some patients to tolerate positive airway pressure therapy for obstructive sleep apnea1.  Treatment options include medications such as nasal saline, topical corticosteroid and antihistamine sprays, and oral medications such as antihistamines or decongestants. Non-surgical treatments can include external nasal dilators for selected patients. If these are not successful by themselves, surgery can improve the nasal airway either alone or in combination with  these other options.        Combination Procedures:  Combination of surgical procedures and other treatments may be recommended, particularly if patients have more than one area of narrowing or persistent positional disease.  The success rate of combination surgery ranges from 66-80%2,3.    References  Bird OSORIO. The Role of the Nose in Snoring and Obstructive Sleep Apnoea: An Update.  Eur Arch Otorhinolaryngol. 2011; 268: 1365-73.   Rory SM; Natalio JA; Vinicio JR; Pallanch JF; Hilda MB; Dev SG; Will HILL. Surgical modifications of the upper airway for obstructive sleep apnea in adults: a systematic review and meta-analysis. SLEEP 2010;33(10):2229-4244. Mumtaz TAYLOR. Hypopharyngeal surgery in obstructive sleep apnea: an evidence-based medicine review.  Arch Otolaryngol Head Neck Surg. 2006 Feb;132(2):206-13.  Amandeep YH1, Charu Y, Fito YASHIRA. The efficacy of anatomically based multilevel surgery for obstructive sleep apnea. Otolaryngol Head Neck Surg. 2003 Oct;129(4):327-35.  Kezirian E, Goldberg A. Hypopharyngeal Surgery in Obstructive Sleep Apnea: An Evidence-Based Medicine Review. Arch Otolaryngol Head Neck Surg. 2006 Feb;132(2):206-13.  Jessica RAZA et al. Upper-Airway Stimulation for Obstructive Sleep Apnea.  N Engl J Med. 2014 Jan 9;370(2):139-49.  Gayla Y et al. Increased Incidence of Cardiovascular Disease in Middle-aged Men with Obstructive Sleep Apnea. Am J Respir Crit Care Med; 2002 166: 159-165  Pierre EM et al. Studying Life Effects and Effectiveness of Palatopharyngoplasty (SLEEP) study: Subjective Outcomes of Isolated Uvulopalatopharyngoplasty. Otolaryngol Head Neck Surg. 2011; 144: 623-631.    WHAT IF I ONLY HAVE SNORING?  Mandibular advancement devices, lateral sleep positioning, long-term weight loss and treatment of nasal allergies have been shown to improve snoring.  Exercising tongue muscles with a game (https://apps.[employer]/us/tone/soundly-reduce-snoring/hs6242353391) or stimulating the tongue  during the day with a device (https://doi.org/10.3390/phw86196468) have improved snoring in some individuals.  https://www.[employer]/  https://www.sleepfoundation.org/best-anti-snoring-mouthpieces-and-mouthguards    Remember to Drive Safe... Drive Alive     Sleep health profoundly affects your health, mood, and your safety.  Thirty three percent of the population (one in three of us) is not getting enough sleep and many have a sleep disorder. Not getting enough sleep or having an untreated / undertreated sleep condition may make us sleepy without even knowing it. In fact, our driving could be dramatically impaired due to our sleep health. As your provider, here are some things I would like you to know about driving:     Here are some warning signs for impairment and dangerous drowsy driving:              -Having been awake more than 16 hours               -Looking tired               -Eyelid drooping              -Head nodding (it could be too late at this point)              -Driving for more than 30 minutes     Some things you could do to make the driving safer if you are experiencing some drowsiness:              -Stop driving and rest              -Call for transportation              -Make sure your sleep disorder is adequately treated     Some things that have been shown NOT to work when experiencing drowsiness while driving:              -Turning on the radio              -Opening windows              -Eating any  distracting  /  entertaining  foods (e.g., sunflower seeds, candy, or any other)              -Talking on the phone      Your decision may not only impact your life, but also the life of others. Please, remember to drive safe for yourself and all of us.

## 2024-10-14 NOTE — NURSING NOTE
Current patient location: [address] [place] [id number]    Is the patient currently in the state of [place]? YES    Visit mode:VIDEO    If the visit is dropped, the patient can be reconnected by: VIDEO VISIT: Text to cell phone:   Telephone Information:   Mobile [phone]       Will anyone else be joining the visit? NO  (If patient encounters technical issues they should call [phone]    Are changes needed to the allergy or medication list? No    Are refills needed on medications prescribed by this physician? NO    Rooming Documentation:  VF Required questionnaires complete    Reason for visit: Consult    Renae SEGUNDOF

## 2024-10-14 NOTE — LETTER
" 10/14/2024      Janiya Samuels  [address]   [facility] [address]        Virtual Visit Details    Type of service:  Video Visit   Start Time: 8:22 AM   End Time: 9:12 AM    Originating Location (pt. Location): Home    Distant Location (provider location):  Off-site  Platform used for Video Visit: [facility]    Outpatient Sleep Medicine Consultation:      Name: Janiya Samuels MRN# [id number]   Age: 54 year old YOB: 1970     Date of Consultation: October 9, 2024  Consultation is requested by: No referring provider defined for this encounter. No ref. provider found  Primary care provider: No Ref-Primary, Physician       Reason for Sleep Consult:     Janiya Samuels is sent by Thao Calero CNP for a sleep consultation regarding snoring.    Patient s Reason for visit  Janiya Samuels main reason for visit:    Patient states problem(s) started:    Janiya Samuels's goals for this visit:             Assessment and Plan:     Summary Sleep Diagnoses and Recommendations:  (G47.30) Observed sleep apnea  (primary encounter diagnosis), (R06.83) Snoring, (G47.8) Non-restorative sleep  Comment: Janiya presents with concerns of sleep apnea.  She has been observed to snore very loudly and have pauses in breathing for years.  She has very strong family history of apnea in her father, 2 brothers and son.  They have a family cabin, and all of her family members have been telling her she stops breathing in her sleep.  She feels unrefreshed upon awakening and requires a couple of cups of coffee for her \"brain to function.\"  That said, she denies napping or inadvertent dozing.  She has difficulty falling asleep if she is not laying down.  Her ESS was normal at 4/24.  STOP BANG TOTAL: 4 snoring, tired, observed apnea, age >50 (54).  Negative risk factors include no HTN, BMI<35 (29), female gender.  Janiya was hospitalized in 2021 with COVID pneumonia.  She feels her lung function has not returned to normal. She has some dyspnea with " exertion. She does have a home oximeter and her SpO2 is usually 94-97%.  She had several arterial blood gas samples drawn while in the hospital and her CO2 was never elevated.  She was on BiPAP in the hospital, though. Overall, I do not think there is enough evidence to suggest high risk for hypoventilation as her CO2 was not elevated and O2 seems to run in the normal range at rest.  Plan: HST - Home Sleep Apnea Test  - WatchPat NonReturnable  Given she lives in [place], I have ordered a disposable test that can be mailed to her.               Comorbid Diagnoses:  Patient Active Problem List   Diagnosis     Acute respiratory failure with hypoxia (H) 2021 due to COVID pneumonia    depression        Summary Counseling:    Sleep Testing Reviewed  Obstructive Sleep Apnea Reviewed  Complications of Untreated Sleep Apnea Reviewed      Patient will follow up 3 months after sleep study. We will review the study results over MyChart and initiate treatment before the follow up if indicated.  Bennett Goltz, PA-C      Total time spent reviewing medical records, history and physical examination, review of previous testing and interpretation as well as documentation on this date:60 min    CC: Thao Calero CNP         History of Present Illness:     Janiya Samuels presents with concerns of sleep apnea. She has a strong family history of TIMOTHY in her father, 2 brothers and son. They share a family cabin and have reported to her that she stops breathing in her sleep. She has low energy in the day. She feels unrefreshed when she wakes and needs coffee for her brain to work. She denies napping or dozing inadvertently, though. She has also woken herself a handful of times with her own snoring.     She was hospitalized for 13 days with COVID pneumonia in 10/2021. She states she still uses her incentive spirometer and does not feel her lung function has returned. She gets short of breath with hiking. She had a number of ABG's around  the time of her admission and her CO2 was always normal or even low. She was on BiPAP in the hospital. She has a home oximeter and her O2 can be as low as 90% at the lowest. In the last few weeks, it has been 94-97%.  She has had some orthostatic hypotension.    Past Sleep Evaluations: None     SLEEP-WAKE SCHEDULE:     Work/School Days: Patient goes to school/work:     Usually gets into bed at   10 PM  Takes patient about 30 min  to fall asleep.   Has trouble falling asleep  0 nights per week  Wakes up in the middle of the night 1-2  times.  Wakes up due to  restroom  She has trouble falling back asleep 0  times a week.   It usually takes 2 min  to get back to sleep  Patient is usually up at  8 AM  Uses alarm:  yes    Weekends/Non-work Days/All Other Days:  Usually gets into bed at   10 PM  Takes patient about 30 min  to fall asleep  Patient is usually up at  6:30-7 AM on Sundays for Jehovah's witness. 8 AM on Saturdays.  Uses alarm:   yes    Sleep Need  Patient gets  9-10 hours  sleep on average   Patient thinks she needs about less  sleep, but she still wakes up tired.    Janiya Samuels prefers to sleep in this position(s):   side, wakes supine  Patient states they do the following activities in bed:  no TV, electronics or reading in bed    Naps  Patient takes a purposeful nap 0  times a week and naps are usually   in duration. Has difficulty sleeping unless laying down.  She feels better after a nap:    She dozes off unintentionally 0  days per week  Patient has had a driving accident or near-miss due to sleepiness/drowsiness:   no      SLEEP DISRUPTIONS:    Breathing/Snoring  Patient snores:  yes  Other people complain about her snoring:   yes,  does. Siblings can hear her from other rooms when they are in the cabin.  Patient has been told she stops breathing in her sleep:  yes  She has issues with the following:  . Morning headaches: infrequently, seems caffeine related. Nocturnal heartburn or reflux: no. Nasal  congestion at night: no.    Movement:  Patient gets pain, discomfort, with an urge to move: no   restless legs symptoms  It happens when she is resting:     It happens more at night:     Patient has been told she kicks her legs at night:   no     Behaviors in Sleep:  Janiya Samuels has experienced the following behaviors while sleeping:   has woken up with teeth clenched just recently (dentist has not commented).  Pt denies sleep talking, sleep walking, and dream enactment behavior. Pt denies sleep paralysis, hypnagogue and cataplexy.       Is there anything else you would like your sleep provider to know:        CAFFEINE AND OTHER SUBSTANCES:    Patient consumes caffeinated beverages per day:    1-2 cups in the morning  Last caffeine use is usually:   noon  List of any prescribed or over the counter stimulants that patient takes:   none  List of any prescribed or over the counter sleep medication patient takes:  none  List of previous sleep medications that patient has tried:  melatonin when stressed (helped her fall asleep a little, has not needed it for months)  Patient drinks alcohol to help them sleep:  no  Patient drinks alcohol near bedtime:  no    Family History:  Patient has a family member been diagnosed with a sleep disorder:      Father, brothers, son have TIMOTHY    Social History:  She works as a , part-time. 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   She lives with her  and 2 daughters (with 2 grandkids), and one other is coming home soon.     SCALES:    EPWORTH SLEEPINESS SCALE         10/14/2024     8:10 AM    [place] Sleepiness Scale ( TASHA Alicea  2233-3629<br>ESS - [place]/English - Final version - 21 Nov 07 - [facility] [place].)   Sitting and reading Would never doze   Watching TV Would never doze   Sitting, inactive in a public place (e.g. a theatre or a meeting) Would never doze   As a passenger in a car for an hour without a break Slight chance of dozing  "  Lying down to rest in the afternoon when circumstances permit High chance of dozing   Sitting and talking to someone Would never doze   Sitting quietly after a lunch without alcohol Would never doze   In a car, while stopped for a few minutes in traffic Would never doze   [place] Score (MC) 4   [place] Score (Sleep) 4         INSOMNIA SEVERITY INDEX (MARK)          10/14/2024     8:09 AM   Insomnia Severity Index (MARK)   Difficulty falling asleep 0   Difficulty staying asleep 1   Problems waking up too early 0   How SATISFIED/DISSATISFIED are you with your CURRENT sleep pattern? 3   How NOTICEABLE to others do you think your sleep problem is in terms of impairing the quality of your life? 2   How WORRIED/DISTRESSED are you about your current sleep problem? 1   To what extent do you consider your sleep problem to INTERFERE with your daily functioning (e.g. daytime fatigue, mood, ability to function at work/daily chores, concentration, memory, mood, etc.) CURRENTLY? 1   MARK Total Score 8       Guidelines for Scoring/Interpretation:  Total score categories:  0-7 = No clinically significant insomnia   8-14 = Subthreshold insomnia   15-21 = Clinical insomnia (moderate severity)  22-28 = Clinical insomnia (severe)  Used via courtesy of www.NeuralStemth.va.gov with permission from Theo Cheatham PhD., [facility]      STOP BANG         10/14/2024     8:12 AM   STOP BANG Questionnaire (  2008, the American Society of Anesthesiologists, Inc. [employer]   BMI Clinic: 29.65         GAD7         No data to display                  CAGE-AID         No data to display                CAGE-AID reprinted with permission from the [place] Medical Journal, NIKOS Arroyo. and EZIO Edwards, \"Conjoint screening questionnaires for alcohol and drug abuse\" [place] Medical Journal 94: 135-140, 1995.      PATIENT HEALTH QUESTIONNAIRE-9 (PHQ - 9)        5/18/2015     1:30 PM   PHQ-9 ([employer])   No Interest In Doing " Things 1   Feeling Depressed 0   Trouble Sleeping 1   Tired / No Energy 1   No appetite or Over-Eating 1   Feeling Bad about Self 1   Trouble Concentrating 1   Moving Slow or Restless 0   Suicidal Thoughts 0   Total Score 6       Developed by Rosmery Clark, Torrie Dc, Cheo Patricia and colleagues, with an educational thomas from [employer]. No permission required to reproduce, translate, display or distribute.        Allergies:    Allergies   Allergen Reactions     Erythromycin        Medications:    Current Outpatient Medications   Medication Sig Dispense Refill     calcium carbonate (TUMS) 500 MG chewable tablet Take 1 chew tab by mouth daily       Cholecalciferol (VITAMIN D3 PO) Take 1 tablet by mouth daily Unknown strength       ibuprofen (ADVIL/MOTRIN) 600 MG tablet Take 600 mg by mouth every 6 hours as needed for moderate pain        Multiple Vitamins-Minerals (ZINC PO) Take 1 tablet by mouth daily Unknown strength       sertraline (ZOLOFT) 100 MG tablet Take 1 tablet by mouth daily at 2 pm.         Problem List:  Patient Active Problem List    Diagnosis Date Noted     Prediabetes-A1c 6.2 2021     Priority: Medium     Acute respiratory failure with hypoxia (H) 10/30/2021     Priority: Medium     Pneumonia due to 2019 novel coronavirus 10/30/2021     Priority: Medium     Transaminitis 10/30/2021     Priority: Medium     S/P laparoscopic cholecystectomy 2017     Priority: Medium        Past Medical/Surgical History:  No past medical history on file.  Past Surgical History:   Procedure Laterality Date      SECTION      5th child     LAPAROSCOPIC CHOLECYSTECTOMY N/A 2017    Procedure: LAPAROSCOPIC CHOLECYSTECTOMY;  Laparoscopic Cholecystectomy;  Surgeon: Cordell Cisneros MD;  Location:  OR       Social History:  Social History     Socioeconomic History     Marital status:      Spouse name: Not on file     Number of children: Not on file     Years of education:  "Not on file     Highest education level: Not on file   Occupational History     Not on file   Tobacco Use     Smoking status: Never     Passive exposure: Never     Smokeless tobacco: Never   Vaping Use     Vaping status: Never Used   Substance and Sexual Activity     Alcohol use: Yes     Comment: occ     Drug use: Not Currently     Sexual activity: Yes     Partners: Male     Birth control/protection: Surgical     Comment:  vast   Other Topics Concern     Parent/sibling w/ CABG, MI or angioplasty before 65F 55M? Not Asked   Social History Narrative     Not on file     Social Determinants of Health     Financial Resource Strain: Not on file   Food Insecurity: Not on file   Transportation Needs: Not on file   Physical Activity: Not on file   Stress: Not on file   Social Connections: Not on file   Interpersonal Safety: Not on file   Housing Stability: Not on file       Family History:  Family History   Problem Relation Age of Onset     Other Cancer Mother      Diabetes Mother      Hypertension Mother      Hypertension Father        Review of Systems:  A complete review of systems reviewed by me is negative with the exeption of what has been mentioned in the history of present illness.        Physical Examination:  Vitals: Ht 1.727 m (5' 8\")   Wt 88.5 kg (195 lb)   BMI 29.65 kg/m    BMI= Body mass index is 29.65 kg/m .           GENERAL APPEARANCE: healthy, alert, no distress, and cooperative  EYES: Eyes grossly normal to inspection and wearing glasses  HENT: oral mucous membranes moist and oropharynx clear  NECK: no asymmetry, masses, or scars  RESP: no apparent respiratory distress (retractions or difficulty speaking in full sentences), no audible wheeze or cough   Mallampati Class: II.  Tonsillar Stage: 2  visible at pillars.         Data: All pertinent previous laboratory data reviewed     Recent Labs   Lab Test 11/10/21  1141 11/10/21  0807 11/09/21  1153 11/09/21  0909 11/03/21  0822 11/03/21  0535   NA  " "--   --   --  138  --  139   POTASSIUM  --   --   --  3.9  --  3.7   CHLORIDE  --   --   --  105  --  108   CO2  --   --   --  27  --  30   ANIONGAP  --   --   --  6  --  1*   * 111*   < > 131*   < > 111*   BUN  --   --   --  13  --  15   CR  --   --   --  0.51*  --  0.43*   HI  --   --   --  8.5  --  7.8*    < > = values in this interval not displayed.       Recent Labs   Lab Test 11/09/21  0909   WBC 13.1*   RBC 4.77   HGB 14.1   HCT 41.9   MCV 88   MCH 29.6   MCHC 33.7   RDW 13.8   *       Recent Labs   Lab Test 11/01/21  0426   PROTTOTAL 6.1*   ALBUMIN 2.2*   BILITOTAL 0.4   ALKPHOS 53   AST 37   ALT 54*       No results found for: \"TSH\"    No results found for: \"UAMP\", \"UBARB\", \"BENZODIAZEUR\", \"UCANN\", \"UCOC\", \"OPIT\", \"UPCP\"    No results found for: \"IRONSAT\", \"[id number]\", \"OMAR\"    pH Arterial (no units)   Date Value   11/03/2021 7.50 (H)   11/03/2021 7.45     pO2 Arterial (mm Hg)   Date Value   11/03/2021 65 (L)   11/03/2021 67 (L)     pCO2 Arterial (mm Hg)   Date Value   11/03/2021 37   11/03/2021 42     Bicarbonate Arterial (mmol/L)   Date Value   11/03/2021 29 (H)   11/03/2021 29 (H)     Base Excess/Deficit Arterial (mmol/L)   Date Value   11/03/2021 5.3 (H)   11/03/2021 4.5 (H)       @LABRCNTIPR(phv:4,pco2v:4,po2v:4,hco3v:4,saran:4,o2per:4)@    Echocardiology: No results found for this or any previous visit (from the past 4320 hour(s)).    Chest x-ray: No results found for this or any previous visit from the past 365 days.      Chest CT: No results found for this or any previous visit from the past 365 days.      PFT: Most Recent [facility] Pulmonary Function Testing    No results found for: \"20001\"        Bennett Ezra Goltz, PA-C, PA-C 10/9/2024              Sincerely,        Bennett Ezra Goltz, PA-C, PA-C      "

## 2025-02-17 ASSESSMENT — SLEEP AND FATIGUE QUESTIONNAIRES
HOW LIKELY ARE YOU TO NOD OFF OR FALL ASLEEP WHILE WATCHING TV: SLIGHT CHANCE OF DOZING
HOW LIKELY ARE YOU TO NOD OFF OR FALL ASLEEP WHEN YOU ARE A PASSENGER IN A CAR FOR AN HOUR WITHOUT A BREAK: SLIGHT CHANCE OF DOZING
HOW LIKELY ARE YOU TO NOD OFF OR FALL ASLEEP WHILE SITTING AND TALKING TO SOMEONE: WOULD NEVER DOZE
HOW LIKELY ARE YOU TO NOD OFF OR FALL ASLEEP IN A CAR, WHILE STOPPED FOR A FEW MINUTES IN TRAFFIC: WOULD NEVER DOZE
HOW LIKELY ARE YOU TO NOD OFF OR FALL ASLEEP WHILE SITTING QUIETLY AFTER LUNCH WITHOUT ALCOHOL: WOULD NEVER DOZE
HOW LIKELY ARE YOU TO NOD OFF OR FALL ASLEEP WHILE LYING DOWN TO REST IN THE AFTERNOON WHEN CIRCUMSTANCES PERMIT: HIGH CHANCE OF DOZING
HOW LIKELY ARE YOU TO NOD OFF OR FALL ASLEEP WHILE SITTING INACTIVE IN A PUBLIC PLACE: WOULD NEVER DOZE
HOW LIKELY ARE YOU TO NOD OFF OR FALL ASLEEP WHILE SITTING AND READING: SLIGHT CHANCE OF DOZING

## 2025-02-21 ENCOUNTER — VIRTUAL VISIT (OUTPATIENT)
Dept: SLEEP MEDICINE | Facility: CLINIC | Age: 55
End: 2025-02-21
Attending: PHYSICIAN ASSISTANT
Payer: COMMERCIAL

## 2025-02-21 DIAGNOSIS — G47.8 NON-RESTORATIVE SLEEP: ICD-10-CM

## 2025-02-21 DIAGNOSIS — R06.83 SNORING: ICD-10-CM

## 2025-02-21 DIAGNOSIS — G47.30 OBSERVED SLEEP APNEA: ICD-10-CM

## 2025-02-27 NOTE — PROGRESS NOTES
Device has been registered and shipped via [employer] on 02/21/25. Patient was notified that package was mailed out.

## 2025-05-17 ENCOUNTER — HEALTH MAINTENANCE LETTER (OUTPATIENT)
Age: 55
End: 2025-05-17

## 2025-07-01 ENCOUNTER — DOCUMENTATION ONLY (OUTPATIENT)
Dept: SLEEP MEDICINE | Facility: CLINIC | Age: 55
End: 2025-07-01
Payer: COMMERCIAL

## 2025-07-19 ENCOUNTER — HEALTH MAINTENANCE LETTER (OUTPATIENT)
Age: 55
End: 2025-07-19

## (undated) DEVICE — PACK GENERAL LAPAOSCOPY

## (undated) DEVICE — SYR 10ML PREFILLED 0.9% NACL INJ NOT STERILE 306500

## (undated) DEVICE — DRSG BANDAID 1X3" FABRIC

## (undated) DEVICE — ESU GROUND PAD UNIVERSAL W/O CORD

## (undated) DEVICE — SU MONOCRYL 4-0 PS-2 18" UND Y496G

## (undated) DEVICE — ENDO CANNULA 05MM VERSAONE UNIVERSAL UNVCA5STF

## (undated) DEVICE — DRSG STERI STRIP 1/2X4" R1547

## (undated) DEVICE — DRSG BANDAID 2X3 1/2" FABRIC

## (undated) DEVICE — ENDO TROCAR 05MM VERSAONE BLADED W/STD FIX CANNULA B5STF

## (undated) DEVICE — SU VICRYL 3-0 SH 27" UND J416H

## (undated) DEVICE — STPL SKIN 35W APPOSE 8886803712

## (undated) DEVICE — ESU SUCTION/IRRIGATION SYSTEM PISTOL GRIP

## (undated) DEVICE — ANTIFOG SOLUTION W/FOAM PAD 31142527

## (undated) DEVICE — NDL INSUFFLATION 120MM VERRES 172015

## (undated) DEVICE — SOL ADH LIQUID BENZOIN SWAB 0.6ML C1544

## (undated) DEVICE — ENDO POUCH GOLD 10MM ECATCH 173050G

## (undated) DEVICE — ENDO CLIP CARTIRDGE K2 MED/LG 10 1112

## (undated) DEVICE — ENDO TROCAR BLADED 11MM STD VERSAONE B11STF

## (undated) DEVICE — ESU HOOK TIP 5MM CONMED

## (undated) DEVICE — SOL NACL 0.9% INJ 1000ML BAG 07983-09

## (undated) DEVICE — GLOVE PROTEXIS W/NEU-THERA 7.5  2D73TE75

## (undated) DEVICE — STOCKING SLEEVE COMPRESSION CALF MED

## (undated) RX ORDER — DEXAMETHASONE SODIUM PHOSPHATE 10 MG/ML
INJECTION INTRAMUSCULAR; INTRAVENOUS
Status: DISPENSED
Start: 2017-06-08

## (undated) RX ORDER — ONDANSETRON 2 MG/ML
INJECTION INTRAMUSCULAR; INTRAVENOUS
Status: DISPENSED
Start: 2017-06-08

## (undated) RX ORDER — PROPOFOL 10 MG/ML
INJECTION, EMULSION INTRAVENOUS
Status: DISPENSED
Start: 2017-06-08

## (undated) RX ORDER — BUPIVACAINE HYDROCHLORIDE AND EPINEPHRINE 2.5; 5 MG/ML; UG/ML
INJECTION, SOLUTION EPIDURAL; INFILTRATION; INTRACAUDAL; PERINEURAL
Status: DISPENSED
Start: 2017-06-08

## (undated) RX ORDER — NEOSTIGMINE METHYLSULFATE 5 MG/5 ML
SYRINGE (ML) INTRAVENOUS
Status: DISPENSED
Start: 2017-06-08

## (undated) RX ORDER — GLYCOPYRROLATE 0.2 MG/ML
INJECTION INTRAMUSCULAR; INTRAVENOUS
Status: DISPENSED
Start: 2017-06-08

## (undated) RX ORDER — FENTANYL CITRATE 50 UG/ML
INJECTION, SOLUTION INTRAMUSCULAR; INTRAVENOUS
Status: DISPENSED
Start: 2017-06-08

## (undated) RX ORDER — KETOROLAC TROMETHAMINE 30 MG/ML
INJECTION, SOLUTION INTRAMUSCULAR; INTRAVENOUS
Status: DISPENSED
Start: 2017-06-08

## (undated) RX ORDER — LIDOCAINE HYDROCHLORIDE 20 MG/ML
INJECTION, SOLUTION EPIDURAL; INFILTRATION; INTRACAUDAL; PERINEURAL
Status: DISPENSED
Start: 2017-06-08